# Patient Record
Sex: FEMALE | Race: BLACK OR AFRICAN AMERICAN | NOT HISPANIC OR LATINO | Employment: FULL TIME | URBAN - METROPOLITAN AREA
[De-identification: names, ages, dates, MRNs, and addresses within clinical notes are randomized per-mention and may not be internally consistent; named-entity substitution may affect disease eponyms.]

---

## 2024-07-06 ENCOUNTER — APPOINTMENT (OUTPATIENT)
Dept: RADIOLOGY | Facility: HOSPITAL | Age: 69
End: 2024-07-06
Payer: MEDICARE

## 2024-07-06 ENCOUNTER — HOSPITAL ENCOUNTER (EMERGENCY)
Facility: HOSPITAL | Age: 69
Discharge: HOME | End: 2024-07-07
Attending: EMERGENCY MEDICINE
Payer: MEDICARE

## 2024-07-06 DIAGNOSIS — L03.818 CELLULITIS OF OTHER SPECIFIED SITE: ICD-10-CM

## 2024-07-06 DIAGNOSIS — L08.9 SOFT TISSUE INFECTION OF FOOT: Primary | ICD-10-CM

## 2024-07-06 LAB
ALBUMIN SERPL BCP-MCNC: 4.3 G/DL (ref 3.4–5)
ALP SERPL-CCNC: 81 U/L (ref 33–136)
ALT SERPL W P-5'-P-CCNC: 11 U/L (ref 7–45)
ANION GAP SERPL CALC-SCNC: 13 MMOL/L (ref 10–20)
AST SERPL W P-5'-P-CCNC: 25 U/L (ref 9–39)
BASOPHILS # BLD AUTO: 0.09 X10*3/UL (ref 0–0.1)
BASOPHILS NFR BLD AUTO: 0.7 %
BILIRUB SERPL-MCNC: 0.3 MG/DL (ref 0–1.2)
BUN SERPL-MCNC: 17 MG/DL (ref 6–23)
CALCIUM SERPL-MCNC: 9.8 MG/DL (ref 8.6–10.6)
CHLORIDE SERPL-SCNC: 102 MMOL/L (ref 98–107)
CO2 SERPL-SCNC: 27 MMOL/L (ref 21–32)
CREAT SERPL-MCNC: 0.65 MG/DL (ref 0.5–1.05)
EGFRCR SERPLBLD CKD-EPI 2021: >90 ML/MIN/1.73M*2
EOSINOPHIL # BLD AUTO: 0.27 X10*3/UL (ref 0–0.7)
EOSINOPHIL NFR BLD AUTO: 2.2 %
ERYTHROCYTE [DISTWIDTH] IN BLOOD BY AUTOMATED COUNT: 12.3 % (ref 11.5–14.5)
GLUCOSE SERPL-MCNC: 166 MG/DL (ref 74–99)
HCT VFR BLD AUTO: 37.7 % (ref 36–46)
HGB BLD-MCNC: 13.1 G/DL (ref 12–16)
IMM GRANULOCYTES # BLD AUTO: 0.05 X10*3/UL (ref 0–0.7)
IMM GRANULOCYTES NFR BLD AUTO: 0.4 % (ref 0–0.9)
LYMPHOCYTES # BLD AUTO: 2.09 X10*3/UL (ref 1.2–4.8)
LYMPHOCYTES NFR BLD AUTO: 17.1 %
MCH RBC QN AUTO: 27.1 PG (ref 26–34)
MCHC RBC AUTO-ENTMCNC: 34.7 G/DL (ref 32–36)
MCV RBC AUTO: 78 FL (ref 80–100)
MONOCYTES # BLD AUTO: 0.86 X10*3/UL (ref 0.1–1)
MONOCYTES NFR BLD AUTO: 7 %
NEUTROPHILS # BLD AUTO: 8.85 X10*3/UL (ref 1.2–7.7)
NEUTROPHILS NFR BLD AUTO: 72.6 %
NRBC BLD-RTO: 0 /100 WBCS (ref 0–0)
PLATELET # BLD AUTO: 208 X10*3/UL (ref 150–450)
POTASSIUM SERPL-SCNC: 4.7 MMOL/L (ref 3.5–5.3)
PROT SERPL-MCNC: 7.8 G/DL (ref 6.4–8.2)
RBC # BLD AUTO: 4.83 X10*6/UL (ref 4–5.2)
SODIUM SERPL-SCNC: 137 MMOL/L (ref 136–145)
WBC # BLD AUTO: 12.2 X10*3/UL (ref 4.4–11.3)

## 2024-07-06 PROCEDURE — 85652 RBC SED RATE AUTOMATED: CPT

## 2024-07-06 PROCEDURE — 86140 C-REACTIVE PROTEIN: CPT

## 2024-07-06 PROCEDURE — 85025 COMPLETE CBC W/AUTO DIFF WBC: CPT | Performed by: EMERGENCY MEDICINE

## 2024-07-06 PROCEDURE — 80053 COMPREHEN METABOLIC PANEL: CPT | Performed by: EMERGENCY MEDICINE

## 2024-07-06 PROCEDURE — 99284 EMERGENCY DEPT VISIT MOD MDM: CPT

## 2024-07-06 PROCEDURE — 36415 COLL VENOUS BLD VENIPUNCTURE: CPT | Performed by: EMERGENCY MEDICINE

## 2024-07-06 PROCEDURE — 99285 EMERGENCY DEPT VISIT HI MDM: CPT | Performed by: EMERGENCY MEDICINE

## 2024-07-06 PROCEDURE — 73630 X-RAY EXAM OF FOOT: CPT | Mod: LEFT SIDE | Performed by: RADIOLOGY

## 2024-07-06 PROCEDURE — 73630 X-RAY EXAM OF FOOT: CPT | Mod: LT

## 2024-07-06 RX ORDER — KETOROLAC TROMETHAMINE 15 MG/ML
15 INJECTION, SOLUTION INTRAMUSCULAR; INTRAVENOUS ONCE
Status: COMPLETED | OUTPATIENT
Start: 2024-07-06 | End: 2024-07-07

## 2024-07-06 RX ORDER — IBUPROFEN 400 MG/1
800 TABLET ORAL ONCE
Status: DISCONTINUED | OUTPATIENT
Start: 2024-07-06 | End: 2024-07-06

## 2024-07-06 ASSESSMENT — LIFESTYLE VARIABLES
EVER FELT BAD OR GUILTY ABOUT YOUR DRINKING: NO
EVER HAD A DRINK FIRST THING IN THE MORNING TO STEADY YOUR NERVES TO GET RID OF A HANGOVER: NO
HAVE PEOPLE ANNOYED YOU BY CRITICIZING YOUR DRINKING: NO
HAVE YOU EVER FELT YOU SHOULD CUT DOWN ON YOUR DRINKING: NO
TOTAL SCORE: 0

## 2024-07-06 ASSESSMENT — PAIN - FUNCTIONAL ASSESSMENT: PAIN_FUNCTIONAL_ASSESSMENT: 0-10

## 2024-07-06 ASSESSMENT — COLUMBIA-SUICIDE SEVERITY RATING SCALE - C-SSRS
2. HAVE YOU ACTUALLY HAD ANY THOUGHTS OF KILLING YOURSELF?: NO
6. HAVE YOU EVER DONE ANYTHING, STARTED TO DO ANYTHING, OR PREPARED TO DO ANYTHING TO END YOUR LIFE?: NO
1. IN THE PAST MONTH, HAVE YOU WISHED YOU WERE DEAD OR WISHED YOU COULD GO TO SLEEP AND NOT WAKE UP?: NO

## 2024-07-06 NOTE — ED TRIAGE NOTES
Pt states she did a foot soak last night and scrubbed some skin off, pt now has open area to foot, hx of diabetes

## 2024-07-07 VITALS
RESPIRATION RATE: 16 BRPM | SYSTOLIC BLOOD PRESSURE: 117 MMHG | HEART RATE: 92 BPM | BODY MASS INDEX: 36.32 KG/M2 | TEMPERATURE: 98.4 F | DIASTOLIC BLOOD PRESSURE: 80 MMHG | WEIGHT: 205 LBS | OXYGEN SATURATION: 97 % | HEIGHT: 63 IN

## 2024-07-07 LAB
CRP SERPL-MCNC: 1.94 MG/DL
ERYTHROCYTE [SEDIMENTATION RATE] IN BLOOD BY WESTERGREN METHOD: >130 MM/H (ref 0–30)

## 2024-07-07 PROCEDURE — 2500000004 HC RX 250 GENERAL PHARMACY W/ HCPCS (ALT 636 FOR OP/ED): Mod: JZ,JG

## 2024-07-07 PROCEDURE — 2500000001 HC RX 250 WO HCPCS SELF ADMINISTERED DRUGS (ALT 637 FOR MEDICARE OP)

## 2024-07-07 PROCEDURE — 96375 TX/PRO/DX INJ NEW DRUG ADDON: CPT

## 2024-07-07 PROCEDURE — 96365 THER/PROPH/DIAG IV INF INIT: CPT

## 2024-07-07 RX ORDER — CLINDAMYCIN HYDROCHLORIDE 150 MG/1
450 CAPSULE ORAL 3 TIMES DAILY
Qty: 28 CAPSULE | Refills: 0 | Status: SHIPPED | OUTPATIENT
Start: 2024-07-07 | End: 2024-07-07

## 2024-07-07 RX ORDER — CLINDAMYCIN PHOSPHATE 600 MG/50ML
600 INJECTION, SOLUTION INTRAVENOUS ONCE
Status: COMPLETED | OUTPATIENT
Start: 2024-07-07 | End: 2024-07-07

## 2024-07-07 RX ORDER — OXYCODONE HYDROCHLORIDE 5 MG/1
5 TABLET ORAL EVERY 6 HOURS PRN
Qty: 12 TABLET | Refills: 0 | Status: SHIPPED | OUTPATIENT
Start: 2024-07-07 | End: 2024-07-07

## 2024-07-07 RX ORDER — OXYCODONE HYDROCHLORIDE 5 MG/1
5 TABLET ORAL ONCE
Status: COMPLETED | OUTPATIENT
Start: 2024-07-07 | End: 2024-07-07

## 2024-07-07 RX ORDER — OXYCODONE HYDROCHLORIDE 5 MG/1
5 TABLET ORAL EVERY 6 HOURS PRN
Qty: 12 TABLET | Refills: 0 | Status: SHIPPED | OUTPATIENT
Start: 2024-07-07 | End: 2024-07-10

## 2024-07-07 RX ORDER — IBUPROFEN 600 MG/1
600 TABLET ORAL EVERY 6 HOURS PRN
Qty: 28 TABLET | Refills: 0 | Status: ON HOLD | OUTPATIENT
Start: 2024-07-07 | End: 2024-07-18

## 2024-07-07 RX ORDER — CLINDAMYCIN HYDROCHLORIDE 150 MG/1
450 CAPSULE ORAL 3 TIMES DAILY
Qty: 28 CAPSULE | Refills: 0 | Status: ON HOLD | OUTPATIENT
Start: 2024-07-07 | End: 2024-07-18 | Stop reason: ALTCHOICE

## 2024-07-07 RX ORDER — IBUPROFEN 600 MG/1
600 TABLET ORAL EVERY 6 HOURS PRN
Qty: 28 TABLET | Refills: 0 | Status: SHIPPED | OUTPATIENT
Start: 2024-07-07 | End: 2024-07-07

## 2024-07-07 ASSESSMENT — PAIN - FUNCTIONAL ASSESSMENT: PAIN_FUNCTIONAL_ASSESSMENT: 0-10

## 2024-07-07 ASSESSMENT — PAIN DESCRIPTION - LOCATION
LOCATION: TOE (COMMENT WHICH ONE)
LOCATION: TOE (COMMENT WHICH ONE)

## 2024-07-07 ASSESSMENT — PAIN SCALES - GENERAL
PAINLEVEL_OUTOF10: 10 - WORST POSSIBLE PAIN
PAINLEVEL_OUTOF10: 7
PAINLEVEL_OUTOF10: 5 - MODERATE PAIN
PAINLEVEL_OUTOF10: 8

## 2024-07-07 ASSESSMENT — PAIN DESCRIPTION - ORIENTATION
ORIENTATION: LEFT
ORIENTATION: LEFT

## 2024-07-07 ASSESSMENT — PAIN SCALES - WONG BAKER
WONGBAKER_NUMERICALRESPONSE: HURTS LITTLE MORE
WONGBAKER_NUMERICALRESPONSE: HURTS LITTLE BIT
WONGBAKER_NUMERICALRESPONSE: HURTS LITTLE MORE

## 2024-07-07 ASSESSMENT — PAIN DESCRIPTION - PAIN TYPE: TYPE: ACUTE PAIN

## 2024-07-07 NOTE — ED PROVIDER NOTES
Emergency Department Provider Note        History of Present Illness     History provided by: Patient  Limitations to History: None  External Records Reviewed with Brief Summary: Care Everywhere visit at [facility] on 2022 which showed treatment with linezolid for abscess and cellulitis of left foot, MRSA, GBS      HPI:  Ramya Muller is a 69 y.o. female with a history of diabetes on metformin who presents with a new open wound on her left foot since yesterday when she soaked her feet and scrubbed off dead skin. The wound is on the bottom of her left great toe and she is experiencing increased pain and swelling in her foot and ankle since the incident. She denies any trauma to her foot or ankle. She denies fevers, chills, nausea, and vomiting.    Physical Exam   Triage vitals:  T 36.6 °C (97.9 °F)  HR (!) 103  BP (!) 169/96  RR 16  O2 98 % None (Room air)    Physical Exam  Constitutional:       General: She is not in acute distress.  Cardiovascular:      Rate and Rhythm: Normal rate and regular rhythm.      Pulses: Normal pulses.      Heart sounds: Normal heart sounds.   Pulmonary:      Effort: Pulmonary effort is normal.      Breath sounds: Normal breath sounds.   Musculoskeletal:      Comments: Mild swelling to left ankle and foot   Skin:     Comments: Small, white open wound to plantar aspect of left great toe with surrounding callous and hyperpigmentation. Left foot and ankle warm to touch compared to right lower extremity.   Neurological:      Mental Status: She is alert.          Medical Decision Making & ED Course   Medical Decision Makin y.o. female with a history of diabetes who presents with a new open wound on her left great toe since yesterday. No bone abnormalities were visualized on x-ray. With shared decision making, patient is agreeable to one dose of IV antibiotics in the ED with discharge on oral antibiotics and pain control. She will follow up with podiatry outpatient.      ----  Scoring Tools Utilized: none    Differential diagnoses considered include but are not limited to: localized soft tissue infection, cellulitis, osteomyelitis     Social Determinants of Health which Significantly Impact Care: None identified The following actions were taken to address these social determinants: N/A    EKG Independent Interpretation: EKG not obtained    Independent Result Review and Interpretation:  CBC, CMP, x-ray left foot    Chronic conditions affecting the patient's care: As documented above in [facility]    The patient was discussed with the following consultants/services: None    Care Considerations: As documented above in [facility]    ED Course:  ED Course as of 07/07/24 0104   Sun Jul 07, 2024 0035 XR foot left 3+ views  No bone abnormalities seen on x-ray [MG]      ED Course User Index  [MG] Anna Guevara MD         Diagnoses as of 07/07/24 0104   Soft tissue infection of foot   Cellulitis of other specified site     Disposition   As a result of the work-up, the patient was discharged home.  she was informed of her diagnosis and instructed to come back with any concerns or worsening of condition.  she was agreeable to the plan as discussed above.  she was given the opportunity to ask questions.  All of the patient's questions were answered.    Procedures   Procedures    Patient seen and discussed with ED attending physician.    Anna Guevara MD  Emergency Medicine      Anna Guevara MD  Resident  07/07/24 0131

## 2024-07-18 ENCOUNTER — APPOINTMENT (OUTPATIENT)
Dept: RADIOLOGY | Facility: HOSPITAL | Age: 69
DRG: 617 | End: 2024-07-18
Payer: COMMERCIAL

## 2024-07-18 ENCOUNTER — HOSPITAL ENCOUNTER (INPATIENT)
Facility: HOSPITAL | Age: 69
DRG: 617 | End: 2024-07-18
Attending: EMERGENCY MEDICINE | Admitting: STUDENT IN AN ORGANIZED HEALTH CARE EDUCATION/TRAINING PROGRAM
Payer: COMMERCIAL

## 2024-07-18 DIAGNOSIS — R53.81 DEBILITY: ICD-10-CM

## 2024-07-18 DIAGNOSIS — M86.8X7 OTHER OSTEOMYELITIS OF LEFT FOOT (MULTI): ICD-10-CM

## 2024-07-18 DIAGNOSIS — E11.69 TYPE 2 DIABETES MELLITUS WITH OTHER SPECIFIED COMPLICATION, WITHOUT LONG-TERM CURRENT USE OF INSULIN (MULTI): ICD-10-CM

## 2024-07-18 DIAGNOSIS — E11.51 TYPE 2 DIABETES MELLITUS WITH DIABETIC PERIPHERAL ANGIOPATHY WITHOUT GANGRENE, WITHOUT LONG-TERM CURRENT USE OF INSULIN (MULTI): ICD-10-CM

## 2024-07-18 DIAGNOSIS — K59.00 CONSTIPATION, UNSPECIFIED CONSTIPATION TYPE: ICD-10-CM

## 2024-07-18 DIAGNOSIS — E13.621 DIABETIC ULCER OF TOE OF LEFT FOOT ASSOCIATED WITH DIABETES MELLITUS OF OTHER TYPE, UNSPECIFIED ULCER STAGE (MULTI): Primary | ICD-10-CM

## 2024-07-18 DIAGNOSIS — I82.463: ICD-10-CM

## 2024-07-18 DIAGNOSIS — L97.529 DIABETIC ULCER OF TOE OF LEFT FOOT ASSOCIATED WITH DIABETES MELLITUS OF OTHER TYPE, UNSPECIFIED ULCER STAGE (MULTI): Primary | ICD-10-CM

## 2024-07-18 LAB
ALBUMIN SERPL BCP-MCNC: 3.8 G/DL (ref 3.4–5)
ALP SERPL-CCNC: 79 U/L (ref 33–136)
ALT SERPL W P-5'-P-CCNC: 13 U/L (ref 7–45)
ANION GAP SERPL CALC-SCNC: 15 MMOL/L (ref 10–20)
AST SERPL W P-5'-P-CCNC: 21 U/L (ref 9–39)
BASOPHILS # BLD AUTO: 0.06 X10*3/UL (ref 0–0.1)
BASOPHILS NFR BLD AUTO: 0.6 %
BILIRUB SERPL-MCNC: 0.4 MG/DL (ref 0–1.2)
BUN SERPL-MCNC: 20 MG/DL (ref 6–23)
CALCIUM SERPL-MCNC: 8.9 MG/DL (ref 8.6–10.6)
CHLORIDE SERPL-SCNC: 101 MMOL/L (ref 98–107)
CO2 SERPL-SCNC: 26 MMOL/L (ref 21–32)
CREAT SERPL-MCNC: 0.62 MG/DL (ref 0.5–1.05)
CRP SERPL-MCNC: 14.69 MG/DL
EGFRCR SERPLBLD CKD-EPI 2021: >90 ML/MIN/1.73M*2
EOSINOPHIL # BLD AUTO: 0.23 X10*3/UL (ref 0–0.7)
EOSINOPHIL NFR BLD AUTO: 2.3 %
ERYTHROCYTE [DISTWIDTH] IN BLOOD BY AUTOMATED COUNT: 11.9 % (ref 11.5–14.5)
ERYTHROCYTE [SEDIMENTATION RATE] IN BLOOD BY WESTERGREN METHOD: 84 MM/H (ref 0–30)
EST. AVERAGE GLUCOSE BLD GHB EST-MCNC: 200 MG/DL
GLUCOSE BLD MANUAL STRIP-MCNC: 152 MG/DL (ref 74–99)
GLUCOSE BLD MANUAL STRIP-MCNC: 154 MG/DL (ref 74–99)
GLUCOSE SERPL-MCNC: 124 MG/DL (ref 74–99)
HBA1C MFR BLD: 8.6 %
HCT VFR BLD AUTO: 36.2 % (ref 36–46)
HGB BLD-MCNC: 11.6 G/DL (ref 12–16)
IMM GRANULOCYTES # BLD AUTO: 0.04 X10*3/UL (ref 0–0.7)
IMM GRANULOCYTES NFR BLD AUTO: 0.4 % (ref 0–0.9)
LACTATE SERPL-SCNC: 0.9 MMOL/L (ref 0.4–2)
LYMPHOCYTES # BLD AUTO: 1.6 X10*3/UL (ref 1.2–4.8)
LYMPHOCYTES NFR BLD AUTO: 15.7 %
MAGNESIUM SERPL-MCNC: 1.98 MG/DL (ref 1.6–2.4)
MCH RBC QN AUTO: 25.6 PG (ref 26–34)
MCHC RBC AUTO-ENTMCNC: 32 G/DL (ref 32–36)
MCV RBC AUTO: 80 FL (ref 80–100)
MONOCYTES # BLD AUTO: 0.83 X10*3/UL (ref 0.1–1)
MONOCYTES NFR BLD AUTO: 8.1 %
NEUTROPHILS # BLD AUTO: 7.45 X10*3/UL (ref 1.2–7.7)
NEUTROPHILS NFR BLD AUTO: 72.9 %
NRBC BLD-RTO: 0 /100 WBCS (ref 0–0)
PLATELET # BLD AUTO: 242 X10*3/UL (ref 150–450)
POTASSIUM SERPL-SCNC: 3.7 MMOL/L (ref 3.5–5.3)
PROT SERPL-MCNC: 7.6 G/DL (ref 6.4–8.2)
RBC # BLD AUTO: 4.54 X10*6/UL (ref 4–5.2)
SODIUM SERPL-SCNC: 138 MMOL/L (ref 136–145)
WBC # BLD AUTO: 10.2 X10*3/UL (ref 4.4–11.3)

## 2024-07-18 PROCEDURE — 96361 HYDRATE IV INFUSION ADD-ON: CPT

## 2024-07-18 PROCEDURE — 85025 COMPLETE CBC W/AUTO DIFF WBC: CPT | Performed by: PHYSICIAN ASSISTANT

## 2024-07-18 PROCEDURE — 82728 ASSAY OF FERRITIN: CPT

## 2024-07-18 PROCEDURE — 99285 EMERGENCY DEPT VISIT HI MDM: CPT | Mod: 25

## 2024-07-18 PROCEDURE — 2500000001 HC RX 250 WO HCPCS SELF ADMINISTERED DRUGS (ALT 637 FOR MEDICARE OP)

## 2024-07-18 PROCEDURE — 73630 X-RAY EXAM OF FOOT: CPT | Mod: LT

## 2024-07-18 PROCEDURE — 36415 COLL VENOUS BLD VENIPUNCTURE: CPT | Performed by: PHYSICIAN ASSISTANT

## 2024-07-18 PROCEDURE — 1100000001 HC PRIVATE ROOM DAILY

## 2024-07-18 PROCEDURE — 82947 ASSAY GLUCOSE BLOOD QUANT: CPT

## 2024-07-18 PROCEDURE — 83605 ASSAY OF LACTIC ACID: CPT | Performed by: PHYSICIAN ASSISTANT

## 2024-07-18 PROCEDURE — 80053 COMPREHEN METABOLIC PANEL: CPT | Performed by: PHYSICIAN ASSISTANT

## 2024-07-18 PROCEDURE — 2500000001 HC RX 250 WO HCPCS SELF ADMINISTERED DRUGS (ALT 637 FOR MEDICARE OP): Performed by: PHYSICIAN ASSISTANT

## 2024-07-18 PROCEDURE — 83540 ASSAY OF IRON: CPT

## 2024-07-18 PROCEDURE — 83036 HEMOGLOBIN GLYCOSYLATED A1C: CPT

## 2024-07-18 PROCEDURE — 87040 BLOOD CULTURE FOR BACTERIA: CPT | Performed by: PHYSICIAN ASSISTANT

## 2024-07-18 PROCEDURE — 2500000004 HC RX 250 GENERAL PHARMACY W/ HCPCS (ALT 636 FOR OP/ED)

## 2024-07-18 PROCEDURE — 73630 X-RAY EXAM OF FOOT: CPT | Mod: LEFT SIDE | Performed by: RADIOLOGY

## 2024-07-18 PROCEDURE — 96367 TX/PROPH/DG ADDL SEQ IV INF: CPT

## 2024-07-18 PROCEDURE — 2500000002 HC RX 250 W HCPCS SELF ADMINISTERED DRUGS (ALT 637 FOR MEDICARE OP, ALT 636 FOR OP/ED)

## 2024-07-18 PROCEDURE — 96375 TX/PRO/DX INJ NEW DRUG ADDON: CPT

## 2024-07-18 PROCEDURE — 86140 C-REACTIVE PROTEIN: CPT

## 2024-07-18 PROCEDURE — 85652 RBC SED RATE AUTOMATED: CPT

## 2024-07-18 PROCEDURE — 84466 ASSAY OF TRANSFERRIN: CPT

## 2024-07-18 PROCEDURE — 99285 EMERGENCY DEPT VISIT HI MDM: CPT | Performed by: PHYSICIAN ASSISTANT

## 2024-07-18 PROCEDURE — 96365 THER/PROPH/DIAG IV INF INIT: CPT

## 2024-07-18 PROCEDURE — 2500000004 HC RX 250 GENERAL PHARMACY W/ HCPCS (ALT 636 FOR OP/ED): Performed by: PHYSICIAN ASSISTANT

## 2024-07-18 PROCEDURE — 73718 MRI LOWER EXTREMITY W/O DYE: CPT | Mod: LT

## 2024-07-18 PROCEDURE — 83735 ASSAY OF MAGNESIUM: CPT

## 2024-07-18 RX ORDER — DEXTROSE 50 % IN WATER (D50W) INTRAVENOUS SYRINGE
12.5
Status: DISCONTINUED | OUTPATIENT
Start: 2024-07-18 | End: 2024-07-30 | Stop reason: HOSPADM

## 2024-07-18 RX ORDER — CLINDAMYCIN IN PERCENT DEXTROSE 6 MG/ML
300 INJECTION, SOLUTION INTRAVENOUS EVERY 8 HOURS
Status: DISCONTINUED | OUTPATIENT
Start: 2024-07-18 | End: 2024-07-18

## 2024-07-18 RX ORDER — POLYETHYLENE GLYCOL 3350 17 G/17G
17 POWDER, FOR SOLUTION ORAL DAILY PRN
Status: DISCONTINUED | OUTPATIENT
Start: 2024-07-18 | End: 2024-07-20

## 2024-07-18 RX ORDER — DEXTROSE 50 % IN WATER (D50W) INTRAVENOUS SYRINGE
25
Status: DISCONTINUED | OUTPATIENT
Start: 2024-07-18 | End: 2024-07-30 | Stop reason: HOSPADM

## 2024-07-18 RX ORDER — CYCLOBENZAPRINE HCL 10 MG
5 TABLET ORAL ONCE
Status: COMPLETED | OUTPATIENT
Start: 2024-07-18 | End: 2024-07-18

## 2024-07-18 RX ORDER — ENOXAPARIN SODIUM 100 MG/ML
40 INJECTION SUBCUTANEOUS EVERY 24 HOURS
Status: DISCONTINUED | OUTPATIENT
Start: 2024-07-18 | End: 2024-07-30 | Stop reason: HOSPADM

## 2024-07-18 RX ORDER — DULOXETIN HYDROCHLORIDE 30 MG/1
30 CAPSULE, DELAYED RELEASE ORAL DAILY
COMMUNITY

## 2024-07-18 RX ORDER — HYDROMORPHONE HYDROCHLORIDE 1 MG/ML
0.4 INJECTION, SOLUTION INTRAMUSCULAR; INTRAVENOUS; SUBCUTANEOUS
Status: DISCONTINUED | OUTPATIENT
Start: 2024-07-18 | End: 2024-07-18

## 2024-07-18 RX ORDER — MORPHINE SULFATE 4 MG/ML
4 INJECTION INTRAVENOUS ONCE
Status: COMPLETED | OUTPATIENT
Start: 2024-07-18 | End: 2024-07-18

## 2024-07-18 RX ORDER — INSULIN LISPRO 100 [IU]/ML
0-5 INJECTION, SOLUTION INTRAVENOUS; SUBCUTANEOUS
Status: DISCONTINUED | OUTPATIENT
Start: 2024-07-18 | End: 2024-07-30 | Stop reason: HOSPADM

## 2024-07-18 RX ORDER — TRIAMTERENE/HYDROCHLOROTHIAZID 37.5-25 MG
1 TABLET ORAL DAILY
COMMUNITY

## 2024-07-18 RX ORDER — DULOXETIN HYDROCHLORIDE 30 MG/1
30 CAPSULE, DELAYED RELEASE ORAL DAILY
Status: DISCONTINUED | OUTPATIENT
Start: 2024-07-18 | End: 2024-07-30 | Stop reason: HOSPADM

## 2024-07-18 RX ORDER — VANCOMYCIN HYDROCHLORIDE 750 MG/150ML
750 INJECTION, SOLUTION INTRAVENOUS EVERY 8 HOURS
Status: DISCONTINUED | OUTPATIENT
Start: 2024-07-19 | End: 2024-07-22

## 2024-07-18 RX ORDER — VANCOMYCIN HYDROCHLORIDE 1 G/20ML
INJECTION, POWDER, LYOPHILIZED, FOR SOLUTION INTRAVENOUS DAILY PRN
Status: DISCONTINUED | OUTPATIENT
Start: 2024-07-18 | End: 2024-07-25

## 2024-07-18 RX ORDER — IBUPROFEN 600 MG/1
600 TABLET ORAL 3 TIMES DAILY PRN
Status: DISCONTINUED | OUTPATIENT
Start: 2024-07-18 | End: 2024-07-20

## 2024-07-18 RX ORDER — OXYCODONE HYDROCHLORIDE 5 MG/1
5 TABLET ORAL EVERY 6 HOURS PRN
COMMUNITY
End: 2024-07-30 | Stop reason: HOSPADM

## 2024-07-18 RX ORDER — VANCOMYCIN HYDROCHLORIDE 1 G/200ML
2 INJECTION, SOLUTION INTRAVENOUS ONCE
Status: COMPLETED | OUTPATIENT
Start: 2024-07-18 | End: 2024-07-18

## 2024-07-18 RX ORDER — BISMUTH SUBSALICYLATE 262 MG
1 TABLET,CHEWABLE ORAL DAILY
COMMUNITY

## 2024-07-18 RX ORDER — ACETAMINOPHEN 500 MG
2000 TABLET ORAL DAILY
COMMUNITY

## 2024-07-18 SDOH — HEALTH STABILITY: PHYSICAL HEALTH: ON AVERAGE, HOW MANY DAYS PER WEEK DO YOU ENGAGE IN MODERATE TO STRENUOUS EXERCISE (LIKE A BRISK WALK)?: 0 DAYS

## 2024-07-18 SDOH — SOCIAL STABILITY: SOCIAL INSECURITY: ARE THERE ANY APPARENT SIGNS OF INJURIES/BEHAVIORS THAT COULD BE RELATED TO ABUSE/NEGLECT?: NO

## 2024-07-18 SDOH — SOCIAL STABILITY: SOCIAL INSECURITY: DO YOU FEEL ANYONE HAS EXPLOITED OR TAKEN ADVANTAGE OF YOU FINANCIALLY OR OF YOUR PERSONAL PROPERTY?: NO

## 2024-07-18 SDOH — ECONOMIC STABILITY: INCOME INSECURITY: HOW HARD IS IT FOR YOU TO PAY FOR THE VERY BASICS LIKE FOOD, HOUSING, MEDICAL CARE, AND HEATING?: NOT HARD AT ALL

## 2024-07-18 SDOH — SOCIAL STABILITY: SOCIAL INSECURITY: HAVE YOU HAD ANY THOUGHTS OF HARMING ANYONE ELSE?: NO

## 2024-07-18 SDOH — ECONOMIC STABILITY: INCOME INSECURITY: IN THE LAST 12 MONTHS, WAS THERE A TIME WHEN YOU WERE NOT ABLE TO PAY THE MORTGAGE OR RENT ON TIME?: NO

## 2024-07-18 SDOH — ECONOMIC STABILITY: FOOD INSECURITY: WITHIN THE PAST 12 MONTHS, THE FOOD YOU BOUGHT JUST DIDN'T LAST AND YOU DIDN'T HAVE MONEY TO GET MORE.: NEVER TRUE

## 2024-07-18 SDOH — HEALTH STABILITY: MENTAL HEALTH
STRESS IS WHEN SOMEONE FEELS TENSE, NERVOUS, ANXIOUS, OR CAN'T SLEEP AT NIGHT BECAUSE THEIR MIND IS TROUBLED. HOW STRESSED ARE YOU?: NOT AT ALL

## 2024-07-18 SDOH — SOCIAL STABILITY: SOCIAL INSECURITY: HAS ANYONE EVER THREATENED TO HURT YOUR FAMILY OR YOUR PETS?: NO

## 2024-07-18 SDOH — SOCIAL STABILITY: SOCIAL INSECURITY: ABUSE: ADULT

## 2024-07-18 SDOH — SOCIAL STABILITY: SOCIAL INSECURITY: HAVE YOU HAD THOUGHTS OF HARMING ANYONE ELSE?: NO

## 2024-07-18 SDOH — ECONOMIC STABILITY: TRANSPORTATION INSECURITY
IN THE PAST 12 MONTHS, HAS THE LACK OF TRANSPORTATION KEPT YOU FROM MEDICAL APPOINTMENTS OR FROM GETTING MEDICATIONS?: NO

## 2024-07-18 SDOH — HEALTH STABILITY: PHYSICAL HEALTH: ON AVERAGE, HOW MANY MINUTES DO YOU ENGAGE IN EXERCISE AT THIS LEVEL?: 0 MIN

## 2024-07-18 SDOH — ECONOMIC STABILITY: HOUSING INSECURITY: AT ANY TIME IN THE PAST 12 MONTHS, WERE YOU HOMELESS OR LIVING IN A SHELTER (INCLUDING NOW)?: NO

## 2024-07-18 SDOH — ECONOMIC STABILITY: FOOD INSECURITY: WITHIN THE PAST 12 MONTHS, YOU WORRIED THAT YOUR FOOD WOULD RUN OUT BEFORE YOU GOT MONEY TO BUY MORE.: NEVER TRUE

## 2024-07-18 SDOH — SOCIAL STABILITY: SOCIAL INSECURITY: ARE YOU OR HAVE YOU BEEN THREATENED OR ABUSED PHYSICALLY, EMOTIONALLY, OR SEXUALLY BY ANYONE?: NO

## 2024-07-18 SDOH — ECONOMIC STABILITY: TRANSPORTATION INSECURITY
IN THE PAST 12 MONTHS, HAS LACK OF TRANSPORTATION KEPT YOU FROM MEETINGS, WORK, OR FROM GETTING THINGS NEEDED FOR DAILY LIVING?: NO

## 2024-07-18 SDOH — ECONOMIC STABILITY: HOUSING INSECURITY: IN THE PAST 12 MONTHS, HOW MANY TIMES HAVE YOU MOVED WHERE YOU WERE LIVING?: 1

## 2024-07-18 SDOH — SOCIAL STABILITY: SOCIAL INSECURITY: DO YOU FEEL UNSAFE GOING BACK TO THE PLACE WHERE YOU ARE LIVING?: NO

## 2024-07-18 SDOH — SOCIAL STABILITY: SOCIAL INSECURITY: DOES ANYONE TRY TO KEEP YOU FROM HAVING/CONTACTING OTHER FRIENDS OR DOING THINGS OUTSIDE YOUR HOME?: NO

## 2024-07-18 ASSESSMENT — COGNITIVE AND FUNCTIONAL STATUS - GENERAL
DAILY ACTIVITIY SCORE: 24
MOBILITY SCORE: 24
PATIENT BASELINE BEDBOUND: NO

## 2024-07-18 ASSESSMENT — LIFESTYLE VARIABLES
AUDIT-C TOTAL SCORE: 0
SUBSTANCE_ABUSE_PAST_12_MONTHS: NO
HOW MANY STANDARD DRINKS CONTAINING ALCOHOL DO YOU HAVE ON A TYPICAL DAY: PATIENT DOES NOT DRINK
PRESCIPTION_ABUSE_PAST_12_MONTHS: NO
HOW OFTEN DO YOU HAVE 6 OR MORE DRINKS ON ONE OCCASION: NEVER
SKIP TO QUESTIONS 9-10: 1
HOW OFTEN DO YOU HAVE A DRINK CONTAINING ALCOHOL: NEVER
AUDIT-C TOTAL SCORE: 0

## 2024-07-18 ASSESSMENT — ACTIVITIES OF DAILY LIVING (ADL)
ASSISTIVE_DEVICE: EYEGLASSES
HEARING - RIGHT EAR: FUNCTIONAL
ADEQUATE_TO_COMPLETE_ADL: YES
HEARING - LEFT EAR: FUNCTIONAL
DRESSING YOURSELF: INDEPENDENT
PATIENT'S MEMORY ADEQUATE TO SAFELY COMPLETE DAILY ACTIVITIES?: YES
JUDGMENT_ADEQUATE_SAFELY_COMPLETE_DAILY_ACTIVITIES: YES
BATHING: INDEPENDENT
FEEDING YOURSELF: INDEPENDENT
TOILETING: INDEPENDENT
WALKS IN HOME: INDEPENDENT
GROOMING: INDEPENDENT

## 2024-07-18 ASSESSMENT — PAIN SCALES - GENERAL
PAINLEVEL_OUTOF10: 6
PAINLEVEL_OUTOF10: 10 - WORST POSSIBLE PAIN
PAINLEVEL_OUTOF10: 6
PAINLEVEL_OUTOF10: 10 - WORST POSSIBLE PAIN

## 2024-07-18 ASSESSMENT — COLUMBIA-SUICIDE SEVERITY RATING SCALE - C-SSRS
2. HAVE YOU ACTUALLY HAD ANY THOUGHTS OF KILLING YOURSELF?: NO
1. IN THE PAST MONTH, HAVE YOU WISHED YOU WERE DEAD OR WISHED YOU COULD GO TO SLEEP AND NOT WAKE UP?: NO
6. HAVE YOU EVER DONE ANYTHING, STARTED TO DO ANYTHING, OR PREPARED TO DO ANYTHING TO END YOUR LIFE?: NO

## 2024-07-18 ASSESSMENT — PAIN - FUNCTIONAL ASSESSMENT: PAIN_FUNCTIONAL_ASSESSMENT: 0-10

## 2024-07-18 ASSESSMENT — PATIENT HEALTH QUESTIONNAIRE - PHQ9
2. FEELING DOWN, DEPRESSED OR HOPELESS: NOT AT ALL
1. LITTLE INTEREST OR PLEASURE IN DOING THINGS: NOT AT ALL
SUM OF ALL RESPONSES TO PHQ9 QUESTIONS 1 & 2: 0

## 2024-07-18 NOTE — SIGNIFICANT EVENT
"MEDICINE ADMISSION NOTE    History of Present Illness  Ramya Muller is a 69 y.o. female with PMHx of uncontrolled T2DM (A1c 10.2%, 4/15/2024) presenting with worsening L 1st digit diabetic foot wound associated with increased pain, purulent drainage, and bleeding for the past 2 weeks. Patient originally presented to  ED on 7/6/2024 for a new foot wound at the bottom of her L great toe with increased pain and swelling. It was reported that the wound developed after she soaked her foot in water and then scrubbed off dead skin. XR L foot at that time showed no bone abnormalities. She was diagnosed with a localized soft tissue infection. She was offered CDU admission for IV abx and podiatry consult vs. IV abx and discharge on PO clindamycin with outpatient podiatry consult. Patient elected the latter as she did not want to be admitted. Patient reports that she did not follow up with podiatry since discharge.       Patient denies any fever, chills, lightheadedness, shortness of breath, chest pain, abdominal pain, N/V/C/D.    ED Course:  /70   Pulse 93   Temp 36.9 °C (98.5 °F) (Oral)   Resp 17   Ht 1.575 m (5' 2\")   Wt 88.5 kg (195 lb)   SpO2 98%   BMI 35.67 kg/m²      Labs:  Results from last 7 days   Lab Units 07/18/24  1151   WBC AUTO x10*3/uL 10.2   HEMOGLOBIN g/dL 11.6*   HEMATOCRIT % 36.2   PLATELETS AUTO x10*3/uL 242            Results from last 7 days   Lab Units 07/18/24  1151   SODIUM mmol/L 138   POTASSIUM mmol/L 3.7   CHLORIDE mmol/L 101   CO2 mmol/L 26   BUN mg/dL 20   CREATININE mg/dL 0.62   CALCIUM mg/dL 8.9     Results from last 7 days   Lab Units 07/18/24  1151   ALK PHOS U/L 79   BILIRUBIN TOTAL mg/dL 0.4   PROTEIN TOTAL g/dL 7.6   ALT U/L 13   AST U/L 21                 ED Interventions:  Medications   HYDROmorphone (Dilaudid) injection 0.4 mg (0.4 mg intravenous Given 7/18/24 9887)   vancomycin (Vancocin) pharmacy to dose - pharmacy monitoring (has no administration in time range) "   piperacillin-tazobactam (Zosyn) 4.5 g in dextrose (iso)  mL (has no administration in time range)   glucagon (Glucagen) injection 1 mg (has no administration in time range)   dextrose 50 % injection 25 g (has no administration in time range)   glucagon (Glucagen) injection 1 mg (has no administration in time range)   dextrose 50 % injection 12.5 g (has no administration in time range)   insulin lispro (HumaLOG) injection 0-5 Units (has no administration in time range)   vancomycin (Vancocin) 750 mg in dextrose 5%  mL (has no administration in time range)   sodium chloride 0.9 % bolus 1,000 mL (0 mL intravenous Stopped 7/18/24 1345)   piperacillin-tazobactam (Zosyn) 4.5 g in dextrose (iso)  mL (0 g intravenous Stopped 7/18/24 1216)   vancomycin (Vancocin) 2 g in dextrose 5%  mL (0 g intravenous Stopped 7/18/24 1351)   morphine injection 4 mg (4 mg intravenous Given 7/18/24 1146)   cyclobenzaprine (Flexeril) tablet 5 mg (5 mg oral Given 7/18/24 1418)            Past Medical History  History reviewed. No pertinent past medical history.    Surgical History  History reviewed. No pertinent surgical history.    Social History  She has no history on file for tobacco use, alcohol use, and drug use.    Family History  No family history on file.     Allergies  Atorvastatin and Pregabalin     Physical Exam   Constitutional: Well-developed female in moderate pain, pleasant, nontoxic appearing  Neuro: Aox4, cooperative  HEENT: Normocephalic, atraumatic. EOMI.  Respiratory: CTA bilaterally. No wheezes, rales, or rhonchi. Normal respiratory effort.  Cardiovascular: RRR. No murmurs, gallops, or rubs. No JVD. Radial pulses 2+.  Abdominal: Soft, nondistended, nontender to palpation. Bowel sounds present. No hepatosplenomegaly or masses. No CVA tenderness.  L distal extremity: deep ulcerated wound on L 1st digit with necrotic tissue and surrounding erythema, small amount of blood, purulence, edema, warmth, and  TTP. No crepitus. Edema and warmth up to midfoot. 2+ DP pulse, able to move toes and foot, sensation impaired in digits 1-5 and present everywhere else.  R distal extremity: 2+ DP pulses, motor function, sensation intact.  Psych: Appropriate mood and affect.     Medications  Scheduled medications  insulin lispro, 0-5 Units, subcutaneous, TID  piperacillin-tazobactam, 4.5 g, intravenous, q6h  [START ON 7/19/2024] vancomycin, 750 mg, intravenous, q8h      Continuous medications     PRN medications  PRN medications: dextrose, dextrose, glucagon, glucagon, HYDROmorphone, vancomycin            Assessment/Plan     Ramya Muller is a 69 y.o. female with poorly controlled T2DM (A1c 10.2%, 4/15/2024) initially treated for a diabetic foot soft tissue infection of the L 1st digit now presenting with worsening pain and with nonhealing ulcer that probes to bone suspicious for osteomyelitis of L great toe. XR L foot with OM of L 1st toe, soft tissue gas in the 1st toe. Patient is in pain but otherwise vitally stable and nontoxic appearing. At risk but currently low concern for systemic or necrotizing infection.    #L 1st digit diabetic ulcer  #Concern for osteomyelitis   PLAN  -MRI L foot to confirm OM, evaluate extent of OM  -wound cx  -blood cxs  -ESR/CRP  -Vancomycin (7/18-)  -Zosyn 4.5 g q6h (7/18-)  -Pain control: ibuprofen 600 mg TID PRN, duloxetine 30 mg daily (home med)  -Wound care team consult  -Podiatry consult for possible debridement    #T2DM c/b neuropathy  Poorly controlled. Intermittent metformin adherence.  PLAN  -repeat A1c  -SSI #1 for now, monitor insulin requirement for next 24 hours  -hypoglycemia protocol    #AFIB (per chart review)  -pt is unaware of this diagnosis. No record of rate/rhythm control meds. Currently HR 93  PLAN  -obtain admission EKG      F: s/p 1 L NS  E: prn  N: regular  A: PIV  DVT: lovenox  GI: not indicated    Bowel Regimen: miralax prn  Code Status: Full code (confirmed on  admission)  NOK: Jennifer (daughter): [phone]     Primary Emergency Contact: JENNIFER MAYFIELD               Not yet staffed with the attending.  Alexander Jorgensen MD  PGY-2 Internal Medicine

## 2024-07-18 NOTE — PROGRESS NOTES
Pharmacy Medication History Review    Ramya Muller is a 69 y.o. female admitted for Diabetic ulcer of toe of left foot associated with diabetes mellitus of other type, unspecified ulcer stage (Multi). Pharmacy reviewed the patient's [id number] medications and allergies for accuracy.    The list below reflects the updated PTA list. Comments regarding how patient may be taking medications differently can be found in the Admit Orders Activity  Prior to Admission Medications   Prescriptions Last Dose Informant   DULoxetine (Cymbalta) 30 mg DR capsule Past Week at Monday Self   Sig: Take 1 capsule (30 mg) by mouth once daily as needed. Do not crush or chew.   cholecalciferol (D3-2000) 50 mcg (2,000 unit) capsule Past Week at Monday Self   Sig: Take 1 capsule (50 mcg) by mouth once daily.   clindamycin (Cleocin) 150 mg capsule Not Taking    Sig: Take 3 capsules (450 mg) by mouth 3 times a day for 7 days.   Patient not taking: Reported on 7/18/2024   ibuprofen 600 mg tablet 7/18/2024 Self   Sig: Take 1 tablet (600 mg) by mouth every 6 hours if needed for mild pain (1 - 3) or moderate pain (4 - 6) for up to 7 days.   multivitamin tablet Past Week at Monday Self   Sig: Take 1 tablet by mouth once daily.   oxyCODONE (Roxicodone) 5 mg immediate release tablet 7/17/2024 at Night Self   Sig: Take 1 tablet (5 mg) by mouth every 6 hours if needed for severe pain (7 - 10).   triamterene-hydrochlorothiazid (Maxzide-25) 37.5-25 mg tablet 7/18/2024 Self   Sig: Take 1 tablet by mouth once daily.      Facility-Administered Medications: None        The list below reflects the updated allergy list. Please review each documented allergy for additional clarification and justification.  Allergies  Reviewed by Breanna Phoenix on 7/18/2024        Severity Reactions Comments    Atorvastatin High Anaphylaxis     Pregabalin High Anaphylaxis             Patient accepts M2B at discharge. Pharmacy has been updated to [facility].    Sources used to complete the med history include out patient fill history, [place], and patient interview (patient was a good historian.).     Below are additional concerns with the patient's PTA list.    Diabetes mediations-  no confirmed current medications.  Past history of metformin/glimperide and long/short acting insulins, but no confirmed recent pharmacy fill history for any diabetes meds    -F internal med office visit on 4/18/24 with Dr. Slade states to resume/start taking Lantus 20 units once daily, but this does not appear to have happened based on conversation with patient/no records of any insulin being filled at pharmacies.    ~Patient no longer takes Clindamycin or Naproxen    ~Added:    *Duloxetine    *Triamterene- hydrochlorothiazide     *Oxycodone     *D3    *Multi vit.     BreAnna Phoenix, Alexus  Transitions of Care   Meds Ambulatory and Retail Services  Please reach out via Secure Chat for questions, or if no response call [employer] or [facility]

## 2024-07-18 NOTE — CONSULTS
"Vancomycin Dosing by Pharmacy- Initial    Ramya Muller is a 69 y.o. year old female who Pharmacy has been consulted for vancomycin dosing for bone and joint infection (diabetic foot infection) Based on the patient's indication and renal status this patient is being dosed based on a goal AUC of 400-600.     Renal function can be described as Normal Renal Function    Renal function is at patient's baseline      Visit Vitals  /70   Pulse 93   Temp 36.9 °C (98.5 °F) (Oral)   Resp 17        Lab Results   Component Value Date    CREATININE 0.62 07/18/2024    CREATININE 0.65 07/06/2024        Patient weight is No results found for: \"PTWEIGHT\"    No results found for: \"VANCORANDOM\", \"CULTURE\"     No intake/output data recorded.    No results found for: \"PATIENTTEMP\"       Assessment/Plan    Patient has already been given a loading dose of 2000 mg.  Will initiate vancomycin maintenance,  750 mg every 8 hours.    This dosing regimen is predicted by [employer] to result in the following pharmacokinetic parameters:    Loading dose: N/A  Regimen: 750 mg IV every 8 hours.  Start time: 00:51 on 07/19/2024  Exposure target: AUC24 (range)400-600 mg/L.hr   AUC24,ss: 486 mg/L.hr  Probability of AUC24 > 400: 70 %  Ctrough,ss: 16.4 mg/L  Probability of Ctrough,ss > 20: 33 %  Probability of nephrotoxicity (Lodise ALYCE 2009): 12 %      Follow-up level will be ordered on 7/19 at AM labs unless clinically indicated sooner.  Will continue to monitor renal function daily while on vancomycin and order serum creatinine at least every 48 hours if not already ordered.  Follow for continued vancomycin needs, clinical response, and signs/symptoms of toxicity.     Thank you for allowing me to participate in the care of this patient.     Henrietta Almeida, PharmD             "

## 2024-07-18 NOTE — CARE PLAN
The patient's goals for the shift include Patient willreceived IV antibiotics as ordered this shift    The clinical goals for the shift include Patient will have no c/o of pain or rate pain <4 this shift

## 2024-07-18 NOTE — H&P
"History Of Present Illness  Ramya Muller is a 69 y.o. female presenting with pain, swelling, ulceration, purulent discharge  and bleeding in the plantar aspect of left great toe. She has a past medical history of uncontrolled T2DM with last HbA1c of 10.6 and not under any medications, A-fib not on anticoagulation, tubular adenoma of colon, diverticulosis, arthritis, onychomycosis,  pain predominant fibromyalgia under duloxetine, hypertension not on medications, GERD on omeprazole and TATO on CPAP, stage 3a CKD, ovarian cancer stage IA s/p DOMINICK/BSO.    The lesion on her left great toe occurred on 7/5 as a new open wound when she soaked her feet and scrubbed off dead skin along with a \"piece of meat\". She went to ED on 7/6 at  for this. No bone abnormalities were visualized on x-ray and the patient agreed for one dose of IV antibiotics in the ED with discharge on oral clindamycin and pain control with outpatient podiatry consult. The ulceration has been worsening over the last two weeks despite outpatient oral antibiotics.     She has been diagnosed with diabetes 12 years ago and have been on different medication changes but her diabetes remained uncontrolled. No similar ulcerations had occurred before on the foot but she had been diagnosed and treated for cellulitis of left foot which healed completely.     She reports no claudications, don't have burning feet, shooting electric shock like pain but has painful  cramps in her bilateral lower limbs. Her lower limb has not experienced any color changes recently and but has venous engorgements.     She has no diplopia, clouding of vision, blurry vision, skin itching, froathy urine, limb swellings, puffy face, oliguria, nocturia, skin tags, or color changes.     She don't have history of stroke, myocardia infarction, heart failure, liver disease, pancreatic disease or any autoimmune diseases.     She denies fever, fatigue, malaise, chills and rigors. She denies chest " pain, shortness of breath, abdominal pain, lightheadedness, and palpitations.     In the ED she received IV fluids (0.9% NS), insulin, IV vancomycin and PIP/Tazo for emperic coverage, pain medications (morphine, hydromorphone)for foot pain and cyclobenzaprine for B/L LL cramps.       Social History  She has no history on file for tobacco use, alcohol use, and drug use.    Family History  Stroke in Father   Breast Cancer in Mother, confused about primary bone cancer vs mets  Breast Cancer in Maternal Grandmother   Breast Cancer in Maternal Aunt   Autoimmune disease Daughter: anti-synthetase syndrome      Allergies  Atorvastatin and Pregabalin    Review of Systems       Physical Exam  - The patient was well built, sitting on the chair complaining of lower limb cramps and pain in left food, especially her left toe. No malodor from wound.     - She was well built, communicating good, oriented to TPP and also her issues.     - No signs of pallor, icterus, lymphadenopathy, cyanosis, clubbing, dehydration but her left foot was edematous.    - Eyes: No corneal clouding or lenticular abnormalities    - Neck: Neck veins not distended    - CVS: S2S2M0    - Chest: B/L equal air entry, normal vesicular breath sound, no added sound    Local examination:     - 1 by 1 cm wound with yellow necrotic core surrounded by erythematous and scaly skin. The wound was wet with pus and blood admixed.     - The great toe in general was tender to palpation but Probing was non painful. Probing revealed deep necrotic soft tissue up to the bone. Post probing, there was oozing of pus and blood.     - Both dorsalis pedis and posterior tibialis pulses were palpable bilaterally     - No skin color changes, no cuts / burns / bruises or signs of past trauma or injury.     - Varicose veins in B/L calves     - All toe nails had fungal infections    - Neurological:    -  Decreased  pain, pressure and touch sensation on all toes.   -  Joint position sense  "decreased    -  Could not access vibration sensation    -  Bulk, tone and power WNL    - Ankle reflexes brisk bilaterally    - Knee reflexes brisk bilaterally    - Normal visual filed and gross vision   - Normal Extraocular movements    - Normal examinations of rest of the CN    Last Recorded Vitals  Blood pressure 117/70, pulse 93, temperature 36.9 °C (98.5 °F), temperature source Oral, resp. rate 17, height 1.575 m (5' 2\"), weight 88.5 kg (195 lb), SpO2 98%.    Relevant Results  Scheduled medications  DULoxetine, 30 mg, oral, Daily  enoxaparin, 40 mg, subcutaneous, q24h  insulin lispro, 0-5 Units, subcutaneous, TID  piperacillin-tazobactam, 4.5 g, intravenous, q6h  [START ON 7/19/2024] vancomycin, 750 mg, intravenous, q8h      Continuous medications     PRN medications  PRN medications: dextrose, dextrose, glucagon, glucagon, ibuprofen, polyethylene glycol, vancomycin     Results for orders placed or performed during the hospital encounter of 07/18/24 (from the past 24 hour(s))   CBC and Auto Differential   Result Value Ref Range    WBC 10.2 4.4 - 11.3 x10*3/uL    nRBC 0.0 0.0 - 0.0 /100 WBCs    RBC 4.54 4.00 - 5.20 x10*6/uL    Hemoglobin 11.6 (L) 12.0 - 16.0 g/dL    Hematocrit 36.2 36.0 - 46.0 %    MCV 80 80 - 100 fL    MCH 25.6 (L) 26.0 - 34.0 pg    MCHC 32.0 32.0 - 36.0 g/dL    RDW 11.9 11.5 - 14.5 %    Platelets 242 150 - 450 x10*3/uL    Neutrophils % 72.9 40.0 - 80.0 %    Immature Granulocytes %, Automated 0.4 0.0 - 0.9 %    Lymphocytes % 15.7 13.0 - 44.0 %    Monocytes % 8.1 2.0 - 10.0 %    Eosinophils % 2.3 0.0 - 6.0 %    Basophils % 0.6 0.0 - 2.0 %    Neutrophils Absolute 7.45 1.20 - 7.70 x10*3/uL    Immature Granulocytes Absolute, Automated 0.04 0.00 - 0.70 x10*3/uL    Lymphocytes Absolute 1.60 1.20 - 4.80 x10*3/uL    Monocytes Absolute 0.83 0.10 - 1.00 x10*3/uL    Eosinophils Absolute 0.23 0.00 - 0.70 x10*3/uL    Basophils Absolute 0.06 0.00 - 0.10 x10*3/uL   Comprehensive Metabolic Panel   Result Value " Ref Range    Glucose 124 (H) 74 - 99 mg/dL    Sodium 138 136 - 145 mmol/L    Potassium 3.7 3.5 - 5.3 mmol/L    Chloride 101 98 - 107 mmol/L    Bicarbonate 26 21 - 32 mmol/L    Anion Gap 15 10 - 20 mmol/L    Urea Nitrogen 20 6 - 23 mg/dL    Creatinine 0.62 0.50 - 1.05 mg/dL    eGFR >90 >60 mL/min/1.73m*2    Calcium 8.9 8.6 - 10.6 mg/dL    Albumin 3.8 3.4 - 5.0 g/dL    Alkaline Phosphatase 79 33 - 136 U/L    Total Protein 7.6 6.4 - 8.2 g/dL    AST 21 9 - 39 U/L    Bilirubin, Total 0.4 0.0 - 1.2 mg/dL    ALT 13 7 - 45 U/L   Lactate   Result Value Ref Range    Lactate 0.9 0.4 - 2.0 mmol/L   Blood Culture    Specimen: Peripheral Venipuncture; Blood culture   Result Value Ref Range    Blood Culture Loaded on Instrument - Culture in progress    Blood Culture    Specimen: Peripheral Venipuncture; Blood culture   Result Value Ref Range    Blood Culture Loaded on Instrument - Culture in progress    Sedimentation rate, automated   Result Value Ref Range    Sedimentation Rate 84 (H) 0 - 30 mm/h   C-reactive protein   Result Value Ref Range    C-Reactive Protein 14.69 (H) <1.00 mg/dL   Hemoglobin A1c   Result Value Ref Range    Hemoglobin A1C 8.6 (H) see below %    Estimated Average Glucose 200 Not Established mg/dL   Magnesium   Result Value Ref Range    Magnesium 1.98 1.60 - 2.40 mg/dL   POCT GLUCOSE   Result Value Ref Range    POCT Glucose 152 (H) 74 - 99 mg/dL      XR foot left 3+ views    Result Date: 7/18/2024  Interpreted By:  Smooth Espinoza and Booth Cameron STUDY: XR FOOT LEFT 3+ VIEWS; ;  7/18/2024 12:20 pm   INDICATION: Signs/Symptoms:L toe infection.   COMPARISON: XR left foot 07/06/2024   ACCESSION NUMBER(S): [id number]   ORDERING CLINICIAN: ALEJANDRO DUTTA   FINDINGS: Decreased radiodensity 1st toe distal phalanx with loss of normal trabecular architecture and erosion of overlying cortex, with destruction of the bone stock. There is also medial displacement of eroded cortical fragments. There is question of some  lucency with erosion to the medial side of the head of the proximal phalanx of the 1st digit. Osteophyte present at the plantar aspect of the calcaneus. Enthesophyte noted at posterior aspect of calcaneal tuberosity. Mild degenerative changes of the talocalcaneonavicular joint and forefoot, worst at 1st MTP and DIP joints. There is a wound/ulcer on the distal medial aspect of the 1st toe. There are mottled foci of lucency projecting over the 1st toe most evident along the medial aspect of the toe at the level of the distal metaphysis of the proximal phalanx       1. Findings most compatible with osteomyelitis of the 1st toe with overlying wound/ulcer on the medial side of the toe. 2. Findings which may represent osteomyelitis of the head of the proximal phalanx of the 1st digit. 3. Soft tissue gas in the 1st toe for which differential considerations include sequelae of open nature of process gas-forming infection and/or any recent intervention. 4. MRI is recommended for further assessment.     I personally reviewed the images/study and I agree with the findings as stated. This study was interpreted at [place], [place].   MACRO: None   Signed by: Smooth Espinoza 7/18/2024 1:16 PM Dictation workstation:   [id number]    XR foot left 3+ views    Result Date: 7/7/2024  STUDY: Foot Radiographs; 07/06/2024 11:40 PM INDICATION: Left foot wound, new.  COMPARISON: None available.  ACCESSION NUMBER(S): [id number] ORDERING CLINICIAN: TIERRA POLLACK TECHNIQUE:  Three view(s) of the left foot. FINDINGS:  There is no displaced fracture.  The alignment is anatomic.  Moderate dorsal soft tissue swelling.  No suspicious osseous erosive lesion identified.    No acute osseous abnormality.  Moderate dorsal soft tissue swelling Signed by Dwight Villa MD    .     Assessment/Plan   Principal Problem:    Diabetic ulcer of toe of left foot associated with diabetes mellitus of other type,  unspecified ulcer stage (Multi)    Ramya Muller is a 69 y.o. female with poorly controlled T2DM initially treated for a diabetic foot soft tissue infection of the L 1st digit now presenting with worsening pain and with nonhealing ulcer that probes to bone suspicious for osteomyelitis of L great toe. XR L foot Patient is in pain but otherwise vitally stable and nontoxic appearing. Low concern for systemic infection.     #Diabetic foot with osteomyelitis  - Started on emperic antibiotics (VANC AND PIP/HEBERT)   - Wound and blood culture   - Wound care and debridement   - Distal neurovascular status check q12   - ibuprofen for pain   - DVT prophylaxis   - MRI L foot to confirm OM, evaluate extent of OM   - Podiatry consult for possible debridement     #DM  - Check recent glycemic control by hba1c   - Start on sliding scale insulin   - look for off records home medicines she have been taking     #Fibromyalgia and diabetic neuropathy  - C/W home meds Duloxetine   .  #AFIB  -pt is unaware of this diagnosis. No record of rate/rhythm control meds. Currently HR 93  PLAN  -obtain admission EKG    #Lower leg cramps  - Start cyclobenzaprine      Bowel Regimen: miralax prn  Code Status: Full code (confirmed on admission)  NOK: Jennifer (daughter): [phone]      Primary Emergency Contact: JENNIFER MULLER MD

## 2024-07-18 NOTE — ED PROVIDER NOTES
"This is a 69-year-old female with past medical history of diabetes on metformin who presents to the ED with left great toe pain for approximately the past 1 month.  She was seen here on July 6 and was written a prescription for clindamycin which she states she took as prescribed without any relief of her symptoms.  She feels as if her pain as well as swelling to this toe has been worsening.  She has noticed some purulent drainage from the plantar aspect of this toe.  She denies any recent injury or trauma.  She denies this ever happening previously.  She is unsure of how her blood glucose levels have been recently.  She denies any associated fevers or chills.  She does endorse generalized malaise.  She denies any known history of gout.      History provided by:  Patient   used: No             Visit Vitals  /76   Pulse 95   Temp 36.7 °C (98 °F)   Resp 16   Ht 1.575 m (5' 2\")   Wt 88.5 kg (195 lb)   SpO2 99%   BMI 35.67 kg/m²   BSA 1.97 m²          Physical Exam     Physical exam:   General: Vitals noted, no distress. Afebrile.   EENT:  Hearing grossly intact. Normal phonation. MMM. Airway patient. PERRL. EOMI.   Neck: No midline tenderness or paraspinal tenderness. FROM.   Cardiac: Regular, rate, rhythm. Normal S1 and S2.  No murmurs, gallops, rubs.   Pulmonary: Good air exchange. Lungs clear bilaterally. No wheezes, rhonchi, rales. No accessory muscle use.   Extremities: No peripheral edema.  Full range of motion. Moves all extremities freely.  Exquisite tenderness palpation to the left great toe with associated soft tissue swelling, darkening of the skin as well as excess warmth.  There is a small ulceration to the plantar aspect of the foot.  See photos below.  Full range of motion of the toe.  No crepitus.  DP and PT pulses full and equal bilaterally.  Skin: No rash. Warm and Dry.   Neuro: No focal neurologic deficits. CN 2-12 grossly intact. Sensation equal bilaterally. No weakness. "             Labs Reviewed   CBC WITH AUTO DIFFERENTIAL - Abnormal       Result Value    WBC 10.2      nRBC 0.0      RBC 4.54      Hemoglobin 11.6 (*)     Hematocrit 36.2      MCV 80      MCH 25.6 (*)     MCHC 32.0      RDW 11.9      Platelets 242      Neutrophils % 72.9      Immature Granulocytes %, Automated 0.4      Lymphocytes % 15.7      Monocytes % 8.1      Eosinophils % 2.3      Basophils % 0.6      Neutrophils Absolute 7.45      Immature Granulocytes Absolute, Automated 0.04      Lymphocytes Absolute 1.60      Monocytes Absolute 0.83      Eosinophils Absolute 0.23      Basophils Absolute 0.06     COMPREHENSIVE METABOLIC PANEL - Abnormal    Glucose 124 (*)     Sodium 138      Potassium 3.7      Chloride 101      Bicarbonate 26      Anion Gap 15      Urea Nitrogen 20      Creatinine 0.62      eGFR >90      Calcium 8.9      Albumin 3.8      Alkaline Phosphatase 79      Total Protein 7.6      AST 21      Bilirubin, Total 0.4      ALT 13     LACTATE - Normal    Lactate 0.9      Narrative:     Venipuncture immediately after or during the administration of Metamizole may lead to falsely low results. Testing should be performed immediately  prior to Metamizole dosing.   BLOOD CULTURE   BLOOD CULTURE   POCT GLUCOSE METER       XR foot left 3+ views   Final Result   1. Findings most compatible with osteomyelitis of the 1st toe with   overlying wound/ulcer on the medial side of the toe.   2. Findings which may represent osteomyelitis of the head of the   proximal phalanx of the 1st digit.   3. Soft tissue gas in the 1st toe for which differential   considerations include sequelae of open nature of process gas-forming   infection and/or any recent intervention.   4. MRI is recommended for further assessment.             I personally reviewed the images/study and I agree with the findings   as stated. This study was interpreted at [facility], [place], [place].        MACRO:   None         Signed by: Smooth Espinoza 7/18/2024 1:16 PM   Dictation workstation:   [id number]              ED Course & MDM     Medical Decision Making  This is a 69-year-old female past medical history of diabetes on metformin who presents to the ED with left great toe pain, swelling, as well as an ulceration that has been worsening over the past 1 month despite outpatient oral antibiotics.  On examination she did have soft tissue swelling to this toe with associated ulcer, edema, and excess warmth.  No crepitus.  Full range of motion of toe.  Neurovascular intact throughout lower extremities bilaterally, specifically DP and PT pulses full and equal bilaterally.  X-ray of this foot ordered as well as laboratory studies.  Patient medicated 1 L of IV fluids vancomycin, Zosyn, and morphine for her symptoms.  She was discussed with attending physician.  Patient's x-ray was concerning for osteomyelitis.  No gas noted on her x-ray.  Labs overall grossly unremarkable with a normal lactate 0.9.  Normal WBC at 10.2.  I informed her of all of her results.  On my reassessment she was still endorsing a cramping sensation in her legs.  She was admitted to medicine for further management of her osteomyelitis and was ordered Flexeril for her leg cramping.  She was agreeable to this plan and had no further questions at time of admission.    Amount and/or Complexity of Data Reviewed  Labs: ordered.  Radiology: ordered and independent interpretation performed.     Details: No visualized fracture or dislocation         Diagnoses as of 07/18/24 1355   Diabetic ulcer of toe of left foot associated with diabetes mellitus of other type, unspecified ulcer stage (Multi)   Other osteomyelitis of left foot (Multi)       This was a shared visit with an ED attending.  The patient was seen and discussed with the ED attending    Procedures    LEO Loera, DIAMOND        ATTENDING ATTESTATION  Patient with a history of diabetes recently treated  outpatient for a diabetic foot ulcer presenting to the emergency department with worse pain to her left great toe, swelling redness.  She denies any fevers or chills at home no new injuries or trauma.  The left great toe is edematous, tender to touch especially over the dorsum, there is a plantar ulceration slightly more lateral oriented with no deep undermining, no purulent drainage.  It is warm to touch.  There is no crepitus or subcutaneous emphysema, the pulses DP and PT were normal and appreciable.  Concern for failure of outpatient treatment, no evidence of NSTI, she is otherwise stable without evidence of sepsis.  We will treat with broad-spectrum antibiotics for outpatient antibiotic failure, anticipate admission    Pawel Grigsby, [facility]  [facility]    The patient was seen by the resident/fellow.  I have personally performed a substantive portion of the encounter.  I have seen and examined the patient; agree with the workup, evaluation, MDM, management and diagnosis.    I have reviewed all the nurses' notes and have confirmed their findings, and have incorporated those findings into this medical record.   The care plan has been discussed with the resident/fellow; I have reviewed the resident/fellow’s note and agree with the documented findings with the exception/addition of information listed above.  On my own examination I agree and incorporated in this document my own history, examination findings and clinical decision making.  All notation in this Addendum supersedes information presented by the resident or MAYTE as listed above.        Glenda Marie PA-C  07/18/24 [id number]

## 2024-07-19 ENCOUNTER — APPOINTMENT (OUTPATIENT)
Dept: RADIOLOGY | Facility: HOSPITAL | Age: 69
DRG: 617 | End: 2024-07-19
Payer: COMMERCIAL

## 2024-07-19 ENCOUNTER — APPOINTMENT (OUTPATIENT)
Dept: CARDIOLOGY | Facility: HOSPITAL | Age: 69
DRG: 617 | End: 2024-07-19
Payer: COMMERCIAL

## 2024-07-19 LAB
ALBUMIN SERPL BCP-MCNC: 3.3 G/DL (ref 3.4–5)
ANION GAP SERPL CALC-SCNC: 14 MMOL/L (ref 10–20)
BASOPHILS # BLD AUTO: 0.05 X10*3/UL (ref 0–0.1)
BASOPHILS NFR BLD AUTO: 0.6 %
BUN SERPL-MCNC: 20 MG/DL (ref 6–23)
CALCIUM SERPL-MCNC: 8.6 MG/DL (ref 8.6–10.6)
CHLORIDE SERPL-SCNC: 101 MMOL/L (ref 98–107)
CO2 SERPL-SCNC: 25 MMOL/L (ref 21–32)
CREAT SERPL-MCNC: 0.59 MG/DL (ref 0.5–1.05)
EGFRCR SERPLBLD CKD-EPI 2021: >90 ML/MIN/1.73M*2
EOSINOPHIL # BLD AUTO: 0.3 X10*3/UL (ref 0–0.7)
EOSINOPHIL NFR BLD AUTO: 3.7 %
ERYTHROCYTE [DISTWIDTH] IN BLOOD BY AUTOMATED COUNT: 11.9 % (ref 11.5–14.5)
FERRITIN SERPL-MCNC: 230 NG/ML (ref 8–150)
GLUCOSE BLD MANUAL STRIP-MCNC: 142 MG/DL (ref 74–99)
GLUCOSE BLD MANUAL STRIP-MCNC: 145 MG/DL (ref 74–99)
GLUCOSE BLD MANUAL STRIP-MCNC: 147 MG/DL (ref 74–99)
GLUCOSE BLD MANUAL STRIP-MCNC: 151 MG/DL (ref 74–99)
GLUCOSE SERPL-MCNC: 130 MG/DL (ref 74–99)
HCT VFR BLD AUTO: 35.9 % (ref 36–46)
HGB BLD-MCNC: 11.1 G/DL (ref 12–16)
IMM GRANULOCYTES # BLD AUTO: 0.04 X10*3/UL (ref 0–0.7)
IMM GRANULOCYTES NFR BLD AUTO: 0.5 % (ref 0–0.9)
IRON SATN MFR SERPL: 7 % (ref 25–45)
IRON SERPL-MCNC: 20 UG/DL (ref 35–150)
LYMPHOCYTES # BLD AUTO: 1.2 X10*3/UL (ref 1.2–4.8)
LYMPHOCYTES NFR BLD AUTO: 15 %
MCH RBC QN AUTO: 25.8 PG (ref 26–34)
MCHC RBC AUTO-ENTMCNC: 30.9 G/DL (ref 32–36)
MCV RBC AUTO: 83 FL (ref 80–100)
MONOCYTES # BLD AUTO: 0.75 X10*3/UL (ref 0.1–1)
MONOCYTES NFR BLD AUTO: 9.4 %
NEUTROPHILS # BLD AUTO: 5.68 X10*3/UL (ref 1.2–7.7)
NEUTROPHILS NFR BLD AUTO: 70.8 %
NRBC BLD-RTO: 0 /100 WBCS (ref 0–0)
PHOSPHATE SERPL-MCNC: 3.4 MG/DL (ref 2.5–4.9)
PLATELET # BLD AUTO: 242 X10*3/UL (ref 150–450)
POTASSIUM SERPL-SCNC: 3.7 MMOL/L (ref 3.5–5.3)
RBC # BLD AUTO: 4.31 X10*6/UL (ref 4–5.2)
SODIUM SERPL-SCNC: 136 MMOL/L (ref 136–145)
TIBC SERPL-MCNC: 304 UG/DL (ref 240–445)
TRANSFERRIN SERPL-MCNC: 226 MG/DL (ref 200–360)
UIBC SERPL-MCNC: 284 UG/DL (ref 110–370)
VANCOMYCIN SERPL-MCNC: 10.9 UG/ML (ref 5–20)
WBC # BLD AUTO: 8 X10*3/UL (ref 4.4–11.3)

## 2024-07-19 PROCEDURE — 36415 COLL VENOUS BLD VENIPUNCTURE: CPT

## 2024-07-19 PROCEDURE — 99232 SBSQ HOSP IP/OBS MODERATE 35: CPT

## 2024-07-19 PROCEDURE — 93005 ELECTROCARDIOGRAM TRACING: CPT

## 2024-07-19 PROCEDURE — 93971 EXTREMITY STUDY: CPT | Performed by: RADIOLOGY

## 2024-07-19 PROCEDURE — 2500000004 HC RX 250 GENERAL PHARMACY W/ HCPCS (ALT 636 FOR OP/ED)

## 2024-07-19 PROCEDURE — 2500000004 HC RX 250 GENERAL PHARMACY W/ HCPCS (ALT 636 FOR OP/ED): Performed by: PHARMACIST

## 2024-07-19 PROCEDURE — 80202 ASSAY OF VANCOMYCIN: CPT | Performed by: PHARMACIST

## 2024-07-19 PROCEDURE — 93970 EXTREMITY STUDY: CPT

## 2024-07-19 PROCEDURE — 93010 ELECTROCARDIOGRAM REPORT: CPT | Performed by: INTERNAL MEDICINE

## 2024-07-19 PROCEDURE — 85025 COMPLETE CBC W/AUTO DIFF WBC: CPT

## 2024-07-19 PROCEDURE — 1100000001 HC PRIVATE ROOM DAILY

## 2024-07-19 PROCEDURE — 82565 ASSAY OF CREATININE: CPT

## 2024-07-19 PROCEDURE — 2500000001 HC RX 250 WO HCPCS SELF ADMINISTERED DRUGS (ALT 637 FOR MEDICARE OP)

## 2024-07-19 PROCEDURE — 87040 BLOOD CULTURE FOR BACTERIA: CPT

## 2024-07-19 PROCEDURE — 82947 ASSAY GLUCOSE BLOOD QUANT: CPT

## 2024-07-19 PROCEDURE — 2500000002 HC RX 250 W HCPCS SELF ADMINISTERED DRUGS (ALT 637 FOR MEDICARE OP, ALT 636 FOR OP/ED)

## 2024-07-19 RX ORDER — TRIAMTERENE/HYDROCHLOROTHIAZID 37.5-25 MG
1 TABLET ORAL DAILY
Status: DISCONTINUED | OUTPATIENT
Start: 2024-07-19 | End: 2024-07-30 | Stop reason: HOSPADM

## 2024-07-19 RX ORDER — OXYCODONE HYDROCHLORIDE 5 MG/1
5 TABLET ORAL EVERY 6 HOURS PRN
Status: DISCONTINUED | OUTPATIENT
Start: 2024-07-19 | End: 2024-07-23

## 2024-07-19 ASSESSMENT — PAIN SCALES - GENERAL
PAINLEVEL_OUTOF10: 6
PAINLEVEL_OUTOF10: 10 - WORST POSSIBLE PAIN
PAINLEVEL_OUTOF10: 10 - WORST POSSIBLE PAIN

## 2024-07-19 ASSESSMENT — COGNITIVE AND FUNCTIONAL STATUS - GENERAL
DAILY ACTIVITIY SCORE: 24
MOBILITY SCORE: 24

## 2024-07-19 ASSESSMENT — PAIN DESCRIPTION - LOCATION: LOCATION: TOE (COMMENT WHICH ONE)

## 2024-07-19 ASSESSMENT — ACTIVITIES OF DAILY LIVING (ADL): LACK_OF_TRANSPORTATION: NO

## 2024-07-19 ASSESSMENT — PAIN - FUNCTIONAL ASSESSMENT
PAIN_FUNCTIONAL_ASSESSMENT: 0-10
PAIN_FUNCTIONAL_ASSESSMENT: 0-10

## 2024-07-19 ASSESSMENT — PAIN DESCRIPTION - ORIENTATION: ORIENTATION: LEFT

## 2024-07-19 NOTE — CONSULTS
"Inpatient Diabetes Education Consult    Reason for Visit:  Ramya Muller is a 69 y.o. female who presents for ulcer of L great toe; T2DM    Consulting Service/Provider: Dr. Curtis    Visit Type: Initial visit    Visit Modality: In-person    Discharge Equipment/Supply Needs:       Patient has supplies at home: Blood glucose meter:  , Testing strips:  , and Lancets    Patient History and Assessment:  New diagnosis: N/A  Previous diagnosis: Type 2  Patient known to Diabetes Education department: No  Treatment prior to hospital admission: Oral medications metformin  Complications: nephropathy, peripheral vascular disease, chronic kidney disease, and obesity  PTA Medications:    Current Outpatient Medications   Medication Instructions    cholecalciferol (D3-2000) 2,000 Units, oral, Daily    DULoxetine (CYMBALTA) 30 mg, oral, Daily, Do not crush or chew.    multivitamin tablet 1 tablet, oral, Daily    oxyCODONE (ROXICODONE) 5 mg, oral, Every 6 hours PRN    triamterene-hydrochlorothiazid (Maxzide-25) 37.5-25 mg tablet 1 tablet, oral, Daily       Glucose   Date/Time Value Ref Range Status   07/19/2024 08:04  (H) 74 - 99 mg/dL Final   07/18/2024 11:51  (H) 74 - 99 mg/dL Final   07/06/2024 08:25  (H) 74 - 99 mg/dL Final     No results found for: \"CPEPTIDE\"  Hemoglobin A1C   Date Value Ref Range Status   07/18/2024 8.6 (H) see below % Final   04/15/2024 10.2 (H) 4.3 - 5.6 % Final     Comment:     American Diabetes Association guidelines indicate that patients with HgbA1c in the range 5.7-6.4% are at increased risk for development of diabetes, and intervention by lifestyle modification may be beneficial. HgbA1c greater or equal to 6.5% is considered diagnostic of diabetes.   01/18/2023 10.5 (H) 4.3 - 5.6 % Final     Comment:     American Diabetes Association guidelines indicate that patients with HgbA1c in the range 5.7-6.4% are at increased risk for development of diabetes, and intervention by lifestyle " modification may be beneficial. HgbA1c greater or equal to 6.5% is considered diagnostic of diabetes.   12/05/2022 8.6 (H) 4.3 - 5.6 % Final     Comment:     American Diabetes Association guidelines indicate that patients with HgbA1c in the range 5.7-6.4% are at increased risk for development of diabetes, and intervention by lifestyle modification may be beneficial. HgbA1c greater or equal to 6.5% is considered diagnostic of diabetes.   06/16/2022 12.9 (H) 4.3 - 5.6 % Final     Comment:     American Diabetes Association guidelines indicate that patients with HgbA1c in the range 5.7-6.4% are at increased risk for development of diabetes, and intervention by lifestyle modification may be beneficial. HgbA1c greater or equal to 6.5% is considered diagnostic of diabetes.       Patient Learning/Readiness Assessment:  Pt agreeable to diabetes education consult.     Interventions/Topics Covered:  See After Visit Summary for handouts/information sheets provided to patient.  Education Documentation  No documentation found.        Additional topics covered: Discussed most recent blood glucose values. States high bg for her at home are 150s. Monitors bg once daily (morning) at home. Discussed importance of 2x monitoring at varying times.     She is clear that she will not take insulin at home 2/2 to fear of needles. However, is interested in metformin alternative 2/2 GI distress. Reinforced education on effect of hyperglycemia on circulation.  Additional materials provided: N/A    CGM: TBD    Education Outcome/Recommendations:        Recommendations for bedside nursing:  N/A    Recommendations for Providers: Follow-up w/ PCP and/or Endocrinology    Additional Comments: Ms. Muller follows with endocrine at [facility]. She is agreeable to continued diabetes education visits.     Time spent: 20 minutes

## 2024-07-19 NOTE — PROGRESS NOTES
"   07/19/24 1013   Discharge Planning   Living Arrangements Alone   Support Systems Children   Assistance Needed Pending   Type of Residence Private residence   Do you have animals or pets at home? No   Who is requesting discharge planning? Patient   Home or Post Acute Services   (Pending)   Expected Discharge Disposition   (Pending)   Does the patient need discharge transport arranged? No  (Pt's dtr will provide transport home.)   Financial Resource Strain   How hard is it for you to pay for the very basics like food, housing, medical care, and heating? Not hard   Housing Stability   In the last 12 months, was there a time when you were not able to pay the mortgage or rent on time? N   At any time in the past 12 months, were you homeless or living in a shelter (including now)? N   Transportation Needs   In the past 12 months, has lack of transportation kept you from medical appointments or from getting medications? no   In the past 12 months, has lack of transportation kept you from meetings, work, or from getting things needed for daily living? No     Assessment Note:  Met with pt and introduced myself as care coordinator and member of the Care Transitions team for discharge planning.   Pt was independent prior to admission (denies use of assistive devices).  Pt works part-time and will need a return to work slip.  Pt believes she may need a cane due to pain with toe (\"loss of balance).  Pt uses [facility] for drs appts.  Pt's address, phone number and contact information was verified.  Pt does not have any other questions/concerns at this time.     Previous Home Care: None  DME: Cpap (pt describes as \"ancient\"), glucometer.  Pharmacy: [facility]  Falls: Denies  PCP:  [facility] main campus physician (cannot recall the name).  Last visit was earlier this year.     Brenda Romero MSN, RN-BC  Transitional Care Coordinator (TCC)  [phone]   "

## 2024-07-19 NOTE — PROGRESS NOTES
Vancomycin Dosing by Pharmacy- FOLLOW UP    Ramya Muller is a 69 y.o. year old female who Pharmacy has been consulted for vancomycin dosing for other Diabetic foot infection . Based on the patient's indication and renal status this patient is being dosed based on a goal AUC of 400-600.     Renal function is currently stable.    Current vancomycin dose: 750 mg given every 8 hours    Estimated vancomycin AUC on current dose: 420 mg/L.hr     Visit Vitals  /57 (BP Location: Right arm, Patient Position: Lying)   Pulse 85   Temp 36.1 °C (97 °F) (Temporal)   Resp 16        Lab Results   Component Value Date    CREATININE 0.59 2024    CREATININE 0.62 2024    CREATININE 0.65 2024        Patient weight is as follows:   Vitals:    24 1020   Weight: 88.5 kg (195 lb)       Cultures:  No results found for the encounter in last 14 days.       I/O last 3 completed shifts:  In: 590 (6.7 mL/kg) [P.O.:240; IV Piggyback:350]  Out: - (0 mL/kg)   Weight: 88.5 kg   I/O during current shift:  No intake/output data recorded.    Temp (24hrs), Av.5 °C (97.7 °F), Min:36 °C (96.8 °F), Max:36.9 °C (98.5 °F)      Assessment/Plan    Within goal AUC range. Continue current vancomycin regimen.    This dosing regimen is predicted by [employer] to result in the following pharmacokinetic parameters:  Loading dose: N/A  Regimen: 750 mg IV every 8 hours.  Start time: 14:03 on 2024  Exposure target: AUC24 (range)400-600 mg/L.hr   AUC24,ss: 420 mg/L.hr  Probability of AUC24 > 400: 59 %  Ctrough,ss: 14 mg/L  Probability of Ctrough,ss > 20: 13 %  Probability of nephrotoxicity (Lodise ALYCE ): 9 %      The next level will be obtained on  at am lab draw. May be obtained sooner if clinically indicated.   Will continue to monitor renal function daily while on vancomycin and order serum creatinine at least every 48 hours if not already ordered.  Follow for continued vancomycin needs, clinical response, and  signs/symptoms of toxicity.       Darwin Bowling, PharmD

## 2024-07-19 NOTE — PROGRESS NOTES
"Ramya Muller is a 69 y.o. female on day 1 of admission presenting with Diabetic ulcer of toe of left foot associated with diabetes mellitus of other type, unspecified ulcer stage (Multi).    Subjective   Patient was complaining of tightness and cramps in her bilateral calf muscles. She felt \"throbbing pain\" in her left toe due to \"infections\". No other active issue mentioned.        Objective     Physical Exam  - 1 by 1 cm wound with yellow necrotic core surrounded by erythematous and scaly skin. The wound was wet with pus and blood admixed.      - The great toe in general was tender to palpation but Probing was non painful. Probing revealed deep necrotic soft tissue up to the bone. Post probing, there was oozing of pus and blood.      - Both dorsalis pedis and posterior tibialis pulses were palpable bilaterally      - No skin color changes, no cuts / burns / bruises or signs of past trauma or injury.     - Varicose veins in B/L calves      - All toe nails had fungal infections     -  Decreased  pain, pressure and touch sensation on all toes.    -  Joint position sense decreased      Last Recorded Vitals  Blood pressure 101/57, pulse 85, temperature 36.1 °C (97 °F), temperature source Temporal, resp. rate 16, height 1.575 m (5' 2\"), weight 88.5 kg (195 lb), SpO2 98%.  Intake/Output last 3 Shifts:  I/O last 3 completed shifts:  In: 590 (6.7 mL/kg) [P.O.:240; IV Piggyback:350]  Out: - (0 mL/kg)   Weight: 88.5 kg     Relevant Results    HbA1c: 8.6 %     WBC: 10.2     Hb: 11.6  MCV: 80  MCH: 25.6    Xray left foot:   Osteomyelitis of the proximal and distal phalanx of the 1st digit.     MRI left foot:  1st interphalangeal region soft tissue ulcer with subjacent  osteomyelitis of the 1st distal phalanx and distal aspect of 1st  proximal phalanx. There is an additional medial fluid collection  suspected with internal soft tissue gas which may reflect a tiny  superficial abscess. Cellulitis of left foot.        Iron " studies: Pending     Assessment/Plan   Principal Problem:    Diabetic ulcer of toe of left foot associated with diabetes mellitus of other type, unspecified ulcer stage (Multi)    #Diabetic foot with osteomyelitis  - Started on emperic antibiotics (VANC AND PIP/HEBERT)   - Wound and blood culture results awaiting  - Distal neurovascular status check q12   - Podiatry consult pending for possible debridement     #DM  - Hb A1c 8.6, persistent hyperglycemia on POCT glucose   - Start on sliding scale insulin      #Fibromyalgia and diabetic neuropathy  - C/W home meds Duloxetine     #AFIB  - Patient is unaware of this diagnosis. No record of rate/rhythm control meds.   - EKG normal, no palpitations      #Lower leg cramps  - Iron profile for possible ALANA and RLS   - DVT prophylaxis ongoing   - Awaiting B/L LL venous duplex   - If all negative, Start cyclobenzaprine            Edgar Gaviria MD

## 2024-07-19 NOTE — CONSULTS
Wound Care Consult     Visit Date: 7/19/2024      Patient Name: Ramya Muller         MRN: [id number]           YOB: 1955     Reason for Consult: Left great toe       Wound Assessment:  Wound 07/18/24 Toe (Comment  which one) Dorsal foot;Left (Active)   Site Assessment Dry;Painful;Black 07/19/24 0801   Wound Length (cm) 2 cm 07/18/24 1752   Wound Width (cm) 1 cm 07/18/24 1752   Wound Surface Area (cm^2) 2 cm^2 07/18/24 1752   Drainage Description Red 07/19/24 0801   Drainage Amount Small 07/19/24 0801   Dressing Gauze 07/19/24 0801   Dressing Changed Reinforced 07/19/24 0801   Dressing Status Clean;Dry 07/19/24 0801     Wound care at bedside for wound assessment, patient is off the floor for testing. Image in EMR reviewed. HIGHLY recommend podiatry consult for assessment and treatment. Primary provider, please review recommendation. If you agree with recommendation please enter as wound orders in EMR. Thank you.      Hazel Talbert RN, CWON  7/19/2024  2:42 PM

## 2024-07-19 NOTE — PROGRESS NOTES
Pharmacy Admission Order Reconciliation Review    Ramya Muller is a 69 y.o. female admitted for Diabetic ulcer of toe of left foot associated with diabetes mellitus of other type, unspecified ulcer stage (Multi). Pharmacy reviewed the patient's unreconciled admission medications.    Prior to admission medications that were reviewed and acted on by the pharmacist include:  Vitamin D3  Cymbalta  MVI  These medications have been reconciled.     Any other unreconcilied medications have been addressed and will be ordered or held by the patient's medical team. Medications addressed by the pharmacist may be added or changed by the patient's medical team at any time.    Rekha Cope, PharmD  Transitions of Care Pharmacist  [facility]  Please reach out via Secure Chat for questions

## 2024-07-20 LAB
ALBUMIN SERPL BCP-MCNC: 3.4 G/DL (ref 3.4–5)
ANION GAP SERPL CALC-SCNC: 14 MMOL/L (ref 10–20)
BASOPHILS # BLD AUTO: 0.04 X10*3/UL (ref 0–0.1)
BASOPHILS NFR BLD AUTO: 0.4 %
BUN SERPL-MCNC: 14 MG/DL (ref 6–23)
CALCIUM SERPL-MCNC: 8.6 MG/DL (ref 8.6–10.6)
CHLORIDE SERPL-SCNC: 98 MMOL/L (ref 98–107)
CO2 SERPL-SCNC: 25 MMOL/L (ref 21–32)
CREAT SERPL-MCNC: 0.59 MG/DL (ref 0.5–1.05)
EGFRCR SERPLBLD CKD-EPI 2021: >90 ML/MIN/1.73M*2
EOSINOPHIL # BLD AUTO: 0.21 X10*3/UL (ref 0–0.7)
EOSINOPHIL NFR BLD AUTO: 2.3 %
ERYTHROCYTE [DISTWIDTH] IN BLOOD BY AUTOMATED COUNT: 11.8 % (ref 11.5–14.5)
GLUCOSE BLD MANUAL STRIP-MCNC: 116 MG/DL (ref 74–99)
GLUCOSE BLD MANUAL STRIP-MCNC: 123 MG/DL (ref 74–99)
GLUCOSE BLD MANUAL STRIP-MCNC: 162 MG/DL (ref 74–99)
GLUCOSE SERPL-MCNC: 166 MG/DL (ref 74–99)
HCT VFR BLD AUTO: 33.5 % (ref 36–46)
HGB BLD-MCNC: 10.3 G/DL (ref 12–16)
IMM GRANULOCYTES # BLD AUTO: 0.04 X10*3/UL (ref 0–0.7)
IMM GRANULOCYTES NFR BLD AUTO: 0.4 % (ref 0–0.9)
LYMPHOCYTES # BLD AUTO: 1.29 X10*3/UL (ref 1.2–4.8)
LYMPHOCYTES NFR BLD AUTO: 13.8 %
MAGNESIUM SERPL-MCNC: 2.2 MG/DL (ref 1.6–2.4)
MCH RBC QN AUTO: 25.8 PG (ref 26–34)
MCHC RBC AUTO-ENTMCNC: 30.7 G/DL (ref 32–36)
MCV RBC AUTO: 84 FL (ref 80–100)
MONOCYTES # BLD AUTO: 0.74 X10*3/UL (ref 0.1–1)
MONOCYTES NFR BLD AUTO: 7.9 %
NEUTROPHILS # BLD AUTO: 7 X10*3/UL (ref 1.2–7.7)
NEUTROPHILS NFR BLD AUTO: 75.2 %
NRBC BLD-RTO: 0 /100 WBCS (ref 0–0)
PHOSPHATE SERPL-MCNC: 3.2 MG/DL (ref 2.5–4.9)
PLATELET # BLD AUTO: 220 X10*3/UL (ref 150–450)
POTASSIUM SERPL-SCNC: 3.7 MMOL/L (ref 3.5–5.3)
RBC # BLD AUTO: 3.99 X10*6/UL (ref 4–5.2)
SODIUM SERPL-SCNC: 133 MMOL/L (ref 136–145)
WBC # BLD AUTO: 9.3 X10*3/UL (ref 4.4–11.3)

## 2024-07-20 PROCEDURE — 83735 ASSAY OF MAGNESIUM: CPT

## 2024-07-20 PROCEDURE — 2500000004 HC RX 250 GENERAL PHARMACY W/ HCPCS (ALT 636 FOR OP/ED)

## 2024-07-20 PROCEDURE — 36415 COLL VENOUS BLD VENIPUNCTURE: CPT

## 2024-07-20 PROCEDURE — 2500000002 HC RX 250 W HCPCS SELF ADMINISTERED DRUGS (ALT 637 FOR MEDICARE OP, ALT 636 FOR OP/ED)

## 2024-07-20 PROCEDURE — 2500000001 HC RX 250 WO HCPCS SELF ADMINISTERED DRUGS (ALT 637 FOR MEDICARE OP)

## 2024-07-20 PROCEDURE — 99232 SBSQ HOSP IP/OBS MODERATE 35: CPT

## 2024-07-20 PROCEDURE — 82947 ASSAY GLUCOSE BLOOD QUANT: CPT

## 2024-07-20 PROCEDURE — 80069 RENAL FUNCTION PANEL: CPT

## 2024-07-20 PROCEDURE — 2500000004 HC RX 250 GENERAL PHARMACY W/ HCPCS (ALT 636 FOR OP/ED): Performed by: PHARMACIST

## 2024-07-20 PROCEDURE — 85025 COMPLETE CBC W/AUTO DIFF WBC: CPT

## 2024-07-20 PROCEDURE — 1100000001 HC PRIVATE ROOM DAILY

## 2024-07-20 RX ORDER — POLYETHYLENE GLYCOL 3350 17 G/17G
17 POWDER, FOR SOLUTION ORAL DAILY
Status: DISCONTINUED | OUTPATIENT
Start: 2024-07-20 | End: 2024-07-28

## 2024-07-20 ASSESSMENT — COGNITIVE AND FUNCTIONAL STATUS - GENERAL
MOBILITY SCORE: 24
DAILY ACTIVITIY SCORE: 24

## 2024-07-20 ASSESSMENT — PAIN - FUNCTIONAL ASSESSMENT
PAIN_FUNCTIONAL_ASSESSMENT: 0-10

## 2024-07-20 ASSESSMENT — PAIN SCALES - GENERAL
PAINLEVEL_OUTOF10: 8
PAINLEVEL_OUTOF10: 0 - NO PAIN
PAINLEVEL_OUTOF10: 10 - WORST POSSIBLE PAIN

## 2024-07-20 NOTE — CARE PLAN
The patient's goals for the shift include Patient willreceived IV antibiotics as ordered this shift    The clinical goals for the shift include Patient will rate pain at/or below 4 this shift      Problem: Pain  Goal: Takes deep breaths with improved pain control throughout the shift  Outcome: Progressing  Goal: Turns in bed with improved pain control throughout the shift  Outcome: Progressing  Goal: Walks with improved pain control throughout the shift  Outcome: Progressing  Goal: Performs ADL's with improved pain control throughout shift  Outcome: Progressing  Goal: Participates in PT with improved pain control throughout the shift  Outcome: Progressing  Goal: Free from opioid side effects throughout the shift  Outcome: Progressing  Goal: Free from acute confusion related to pain meds throughout the shift  Outcome: Progressing

## 2024-07-20 NOTE — PROGRESS NOTES
Ramya Muller is a 69 y.o. female on day 2 of admission presenting with Diabetic ulcer of toe of left foot associated with diabetes mellitus of other type, unspecified ulcer stage (Multi).      Subjective   No acute events overnight. Patient was informed that podiatry will have to be consulted on Monday and in meantime she is being treated with IV antibiotics. She is agreeable with plan. Her pain is present in her L great toe but manageable with pain medication regimen. She mentioned she has not had a bowel movement since being at the hospital and is interested in a laxative medication now. Otherwise she denies any new or worsening symptoms.       Objective     Constitutional: Well-developed female in moderate pain, pleasant, nontoxic appearing  Neuro: Aox4, cooperative  HEENT: Normocephalic, atraumatic. EOMI.  Respiratory: CTA bilaterally. No wheezes, rales, or rhonchi. Normal respiratory effort.  Cardiovascular: RRR. No murmurs, gallops, or rubs. No JVD. Radial pulses 2+.  Abdominal: Soft, nondistended, nontender to palpation. Bowel sounds present. No hepatosplenomegaly or masses. No CVA tenderness.  L distal extremity: deep ulcerated wound on L 1st digit with necrotic tissue and surrounding erythema, small amount of purulence, edema, warmth, and TTP. No crepitus. Edema and warmth up to midfoot. 2+ DP pulse, able to move toes and foot, sensation impaired in digits 1-5 and present everywhere else.  R distal extremity: 2+ DP pulses, motor function, sensation intact. Onchomycosis.  Psych: Appropriate mood and affect.     Last Recorded Vitals  /57   Pulse 87   Temp 36.9 °C (98.4 °F)   Resp 18   Wt 88.5 kg (195 lb)   SpO2 96%   Intake/Output last 3 Shifts:    Intake/Output Summary (Last 24 hours) at 7/20/2024 0713  Last data filed at 7/19/2024 1843  Gross per 24 hour   Intake 480 ml   Output --   Net 480 ml       Admission Weight  Weight: 88.5 kg (195 lb) (07/18/24 1020)    Daily Weight  07/18/24 : 88.5 kg  (195 lb)    Image Results  Lower extremity venous duplex bilateral  Narrative: Interpreted By:  Giancarlo Castorena and Calo Sean-Matthew   STUDY:  [facility] LOWER EXTREMITY VENOUS DUPLEX BILATERAL;  7/19/2024 3:10 pm      INDICATION:  Signs/Symptoms:Risk for DVT, complaining of bilateral calf pain /  cramps and tightness.      COMPARISON:  None.      ACCESSION NUMBER(S):  [id number]      ORDERING CLINICIAN:  AASHISH NEWSOME      TECHNIQUE:  Vascular ultrasound of the bilateral lower extremities was performed.  Real-time compression views as well as Gray scale, color Doppler and  spectral Doppler waveform analysis was performed.      FINDINGS:  Evaluation of the visualized portions of the bilateral common  femoral, proximal, mid, and distal femoral, and popliteal veins was  performed.  Evaluation of the visualized portions of the bilateral  posterior tibial and left peroneal veins was also performed.      Limitations: None.      The evaluated veins demonstrate normal compressibility. There is  intact venous flow demonstrating normal respiratory variability and  normal augmentation of flow with calf compression.      Impression: No sonographic evidence for deep vein thrombosis within the evaluated  veins of the bilateral lower extremities.      I personally reviewed the images/study and I agree with the findings  as stated by Margarita Ng MD. This study was interpreted at  [place], [place].      MACRO:  None      Signed by: Giancarlo Castorena 7/19/2024 3:46 PM  Dictation workstation:   [id number]  MR foot left wo IV contrast  Narrative: Interpreted By:  Turner Salcedo and Baker Zachary   STUDY:  MRI of the left foot and ankle without IV contrast; 7/19/2024 1:17 am      INDICATION:  Signs/Symptoms:Diabetic foot with osteomyelitis with edema in whole  left foot.      COMPARISON:  Left foot radiographs on 07/18/2024.      ACCESSION NUMBER(S):  [id number]       ORDERING CLINICIAN:  JERROD GONZALEZ      TECHNIQUE:  MR imaging of the  left foot and ankle was obtained  without  administration of intravenous contrast medium.      FINDINGS:  TENDONS:  There is Achilles tendinosis. This peroneal tendinosis. There  thickening of the plantar fascia. The remainder of the tendons are  intact.      LIGAMENTS:  The major ligamentous structures of the forefoot are normal. The  Lisfranc ligament is normal.      JOINTS/OSSEOUS STRUCTURES:  There is T1 hypointense signal with associated T2/STIR hyperintensity  involving the entirety of the 1st distal phalanx, consistent with  osteomyelitis. Additional areas of irregular T1 hypointensity with  associated T2/STIR hyperintensity throughout the mid to distal 1st  proximal phalanx, likely representing additional site of  osteomyelitis. There is also focus of T2 hyperintense signal with  associated T1 hypointense signal within distal 1st metatarsal (series  10, image 16), favored to to be degenerative in nature. There is no  evidence of significant joint effusion in the forefoot or midfoot.  There is no evidence of acute fracture or dislocation.      SOFT TISSUES:  There is skin surface irregularity and ulceration along the medial  aspect of the distal 1st digit with significant soft tissue edema  about the entirety of the 1st digit. There is a questionable  relatively conglomerate focus of T2 hyperintense signal at the  plantar medial aspect of the 1st proximal phalangeal head measuring  approximately 14 x 11 mm (series 10, image 16). This is very  superficial and likely contiguous with the sinus tract. There is  generalized atrophy of the musculature of the foot. There is  additionally increased T2 signal intensity throughout the  musculature. There is edema throughout the subcutaneous soft tissues  of the visualized foot.      Impression: 1. 1st interphalangeal region soft tissue ulcer with subjacent  osteomyelitis of the 1st distal phalanx  and distal aspect of 1st  proximal phalanx. There is an additional medial fluid collection  suspected with internal soft tissue gas which may reflect a tiny  superficial abscess.  2. Increased T2 signal throughout the subcutaneous soft tissues of  the visualized lower extremity, consistent with generalized edema  and/or cellulitis.  3. 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      I personally reviewed the images/study and I agree with the findings  as stated. This study was interpreted at [facility], [place], [place].      MACRO:  None      Signed by: Turner Salcedo 7/19/2024 9:19 AM  Dictation workstation:   [id number]        Relevant Results               Assessment/Plan        Principal Problem:    Diabetic ulcer of toe of left foot associated with diabetes mellitus of other type, unspecified ulcer stage (Multi)    Ramya Muller is a 69 y.o. female with poorly controlled T2DM (A1c 8%, 4/15/2024) initially treated for a diabetic foot soft tissue infection of the L 1st digit now presenting with worsening pain and with nonhealing ulcer that probes to bone with MRI L foot confirming osteomyelitis of L great toe. with involvement of distal metatarsal w/o fluid collection (septic arthritis w/o drainable pocket).     Updates 7/21  -D/c'd ibuprofen 600 mg tid given risk of JOYCE. Creatinine/GFR currently wnl  -C/w IV vanc/zosyn for osteo while awaiting final eval/tx recs from podiatry when consulted on Monday. Currently unable to take consults over the weekend. Pt however is stable and nontoxic, on IV abx. If no plans for amputation/debridement, then c/s infectious disease for abx management recs  -Negative DVT US BLE 7/19/24  -Iron studies consistent with anemia of chronic disease (iron low, transferrin WNL; ferritin elevated)     #L 1st digit diabetic ulcer  #Osteomyelitis  #Septic arthritis w/o drainable pocket  PLAN  -blood cxs 7/19:  NGTD  -Vancomycin (7/18-)  -Zosyn 4.5 g q6h (7/18-)  -Pain control: duloxetine 30 mg daily (home med); oxycodone 5 mg q6h prn  -Podiatry consult on Monday;     #T2DM c/b neuropathy  A1c 8.6%; BG WNL  PLAN  -SSI #1 for now, required 1 unit/daily  -hypoglycemia protocol    #Fibromyalgia and diabetic neuropathy  - C/W home Duloxetine      #AFIB, resolved  - Chart diagnosis. Patient is unaware of this diagnosis. No record of rate/rhythm control meds. EKG normal, no palpitations      #Lower leg cramps  - Iron profile c/w ACD; consider iron repletion for RLS tx when infection resolves  - DVT US BLE 7/19 negative     F: prn  E: prn  N: regular  A: PIV  DVT: lovenox  GI: not indicated     Bowel Regimen: miralax azeem daily  Code Status: Full code (confirmed on admission)  NOK: Jennifer (daughter): [phone]         Alexander Jorgensen MD

## 2024-07-21 VITALS
HEIGHT: 62 IN | SYSTOLIC BLOOD PRESSURE: 119 MMHG | RESPIRATION RATE: 18 BRPM | BODY MASS INDEX: 35.88 KG/M2 | DIASTOLIC BLOOD PRESSURE: 73 MMHG | WEIGHT: 195 LBS | OXYGEN SATURATION: 94 % | TEMPERATURE: 98.6 F | HEART RATE: 63 BPM

## 2024-07-21 LAB
ABO GROUP (TYPE) IN BLOOD: NORMAL
ANTIBODY SCREEN: NORMAL
BACTERIA BLD CULT: NORMAL
BASOPHILS # BLD AUTO: 0.09 X10*3/UL (ref 0–0.1)
BASOPHILS NFR BLD AUTO: 0.8 %
CHOLEST SERPL-MCNC: 193 MG/DL (ref 0–199)
CHOLESTEROL/HDL RATIO: 6.5
EOSINOPHIL # BLD AUTO: 0.3 X10*3/UL (ref 0–0.7)
EOSINOPHIL NFR BLD AUTO: 2.5 %
ERYTHROCYTE [DISTWIDTH] IN BLOOD BY AUTOMATED COUNT: 11.6 % (ref 11.5–14.5)
GLUCOSE BLD MANUAL STRIP-MCNC: 123 MG/DL (ref 74–99)
GLUCOSE BLD MANUAL STRIP-MCNC: 123 MG/DL (ref 74–99)
GLUCOSE BLD MANUAL STRIP-MCNC: 135 MG/DL (ref 74–99)
GLUCOSE BLD MANUAL STRIP-MCNC: 137 MG/DL (ref 74–99)
GLUCOSE BLD MANUAL STRIP-MCNC: 143 MG/DL (ref 74–99)
HCT VFR BLD AUTO: 33.5 % (ref 36–46)
HDLC SERPL-MCNC: 29.7 MG/DL
HGB BLD-MCNC: 10.9 G/DL (ref 12–16)
IMM GRANULOCYTES # BLD AUTO: 0.07 X10*3/UL (ref 0–0.7)
IMM GRANULOCYTES NFR BLD AUTO: 0.6 % (ref 0–0.9)
LDLC SERPL CALC-MCNC: 137 MG/DL
LYMPHOCYTES # BLD AUTO: 1.96 X10*3/UL (ref 1.2–4.8)
LYMPHOCYTES NFR BLD AUTO: 16.5 %
MCH RBC QN AUTO: 25.6 PG (ref 26–34)
MCHC RBC AUTO-ENTMCNC: 32.5 G/DL (ref 32–36)
MCV RBC AUTO: 79 FL (ref 80–100)
MONOCYTES # BLD AUTO: 0.9 X10*3/UL (ref 0.1–1)
MONOCYTES NFR BLD AUTO: 7.6 %
NEUTROPHILS # BLD AUTO: 8.59 X10*3/UL (ref 1.2–7.7)
NEUTROPHILS NFR BLD AUTO: 72 %
NON HDL CHOLESTEROL: 163 MG/DL (ref 0–149)
NRBC BLD-RTO: 0 /100 WBCS (ref 0–0)
PLATELET # BLD AUTO: 287 X10*3/UL (ref 150–450)
RBC # BLD AUTO: 4.25 X10*6/UL (ref 4–5.2)
RH FACTOR (ANTIGEN D): NORMAL
TRIGL SERPL-MCNC: 131 MG/DL (ref 0–149)
VLDL: 26 MG/DL (ref 0–40)
WBC # BLD AUTO: 11.9 X10*3/UL (ref 4.4–11.3)

## 2024-07-21 PROCEDURE — 80061 LIPID PANEL: CPT

## 2024-07-21 PROCEDURE — 2500000001 HC RX 250 WO HCPCS SELF ADMINISTERED DRUGS (ALT 637 FOR MEDICARE OP)

## 2024-07-21 PROCEDURE — 2500000004 HC RX 250 GENERAL PHARMACY W/ HCPCS (ALT 636 FOR OP/ED)

## 2024-07-21 PROCEDURE — 36415 COLL VENOUS BLD VENIPUNCTURE: CPT

## 2024-07-21 PROCEDURE — 1100000001 HC PRIVATE ROOM DAILY

## 2024-07-21 PROCEDURE — 2500000004 HC RX 250 GENERAL PHARMACY W/ HCPCS (ALT 636 FOR OP/ED): Performed by: PHARMACIST

## 2024-07-21 PROCEDURE — 99232 SBSQ HOSP IP/OBS MODERATE 35: CPT

## 2024-07-21 PROCEDURE — 85025 COMPLETE CBC W/AUTO DIFF WBC: CPT

## 2024-07-21 PROCEDURE — 86901 BLOOD TYPING SEROLOGIC RH(D): CPT

## 2024-07-21 PROCEDURE — 82947 ASSAY GLUCOSE BLOOD QUANT: CPT

## 2024-07-21 RX ORDER — GABAPENTIN 300 MG/1
300 CAPSULE ORAL NIGHTLY
Status: CANCELLED | OUTPATIENT
Start: 2024-07-21

## 2024-07-21 RX ORDER — POLYETHYLENE GLYCOL 3350 17 G/17G
17 POWDER, FOR SOLUTION ORAL DAILY
Qty: 30 PACKET | Refills: 0 | Status: SHIPPED | OUTPATIENT
Start: 2024-07-21 | End: 2024-07-27

## 2024-07-21 ASSESSMENT — COGNITIVE AND FUNCTIONAL STATUS - GENERAL
DAILY ACTIVITIY SCORE: 24
MOBILITY SCORE: 24

## 2024-07-21 ASSESSMENT — PAIN - FUNCTIONAL ASSESSMENT
PAIN_FUNCTIONAL_ASSESSMENT: 0-10

## 2024-07-21 ASSESSMENT — PAIN SCALES - GENERAL
PAINLEVEL_OUTOF10: 0 - NO PAIN
PAINLEVEL_OUTOF10: 7
PAINLEVEL_OUTOF10: 8
PAINLEVEL_OUTOF10: 6
PAINLEVEL_OUTOF10: 6

## 2024-07-21 ASSESSMENT — PAIN DESCRIPTION - LOCATION
LOCATION: TOE (COMMENT WHICH ONE)
LOCATION: TOE (COMMENT WHICH ONE)

## 2024-07-21 NOTE — CARE PLAN
The patient's goals for the shift include Patient willreceived IV antibiotics as ordered this shift    The clinical goals for the shift include pt will remain free from pain this shift -TR    Over the shift, the patient did not make progress toward the following goals. Barriers to progression include   . Recommendations to address these barriers include   .

## 2024-07-21 NOTE — PROGRESS NOTES
"  MEDICINE INPATIENT PROGRESS NOTE      Ramya Muller is a 69 y.o. female on day 3 of admission presenting with Diabetic ulcer of toe of left foot associated with diabetes mellitus of other type, unspecified ulcer stage (Multi).    Subjective   No acute events overnight. Patient reports a 9/10 pain improved from \"11/10\" pain on admission. She states pain medicine regimen helps significantly for the pain. She otherwise reports a bowel movement yesterday. She is eating/drinking without issue. She denies any other acute or worsening issues at this time. Nursing changed wound dressing yesterday.         Objective     Last Recorded Vitals  Blood pressure 103/69, pulse 100, temperature 37 °C (98.6 °F), resp. rate 18, height 1.575 m (5' 2\"), weight 88.5 kg (195 lb), SpO2 98%.  Intake/Output last 3 Shifts:  I/O last 3 completed shifts:  In: 480 (5.4 mL/kg) [P.O.:480]  Out: - (0 mL/kg)   Weight: 88.5 kg       Physical exam  Constitutional: Well-developed female in moderate pain, pleasant, nontoxic appearing  Neuro: Aox4, cooperative  HEENT: Normocephalic, atraumatic. EOMI.  Respiratory: CTA bilaterally. No wheezes, rales, or rhonchi. Normal respiratory effort.  Cardiovascular: RRR. No murmurs, gallops, or rubs. No JVD. Radial pulses 2+.  Abdominal: Soft, nondistended, nontender to palpation. Bowel sounds present. No hepatosplenomegaly or masses. No CVA tenderness.  L distal extremity: deep ulcerated wound on L 1st digit with necrotic tissue and surrounding erythema, small amount of purulence, edema, warmth, and TTP. No crepitus. Edema and warmth up to midfoot. 2+ DP pulse, able to move toes and foot, sensation impaired in digits 1-5 and present everywhere else.  R distal extremity: 2+ DP pulses, motor function, sensation intact. Onchomycosis.  Psych: Appropriate mood and affect.       Relevant Results    Labs    Results from last 7 days   Lab Units 07/20/24  0915 07/19/24  0804 07/18/24  1151   WBC AUTO x10*3/uL 9.3 8.0 " "10.2   HEMOGLOBIN g/dL 10.3* 11.1* 11.6*   HEMATOCRIT % 33.5* 35.9* 36.2   PLATELETS AUTO x10*3/uL 220 242 242            Results from last 7 days   Lab Units 07/20/24  0915 07/19/24  0804 07/18/24  1151   SODIUM mmol/L 133* 136 138   POTASSIUM mmol/L 3.7 3.7 3.7   CO2 mmol/L 25 25 26   ANION GAP mmol/L 14 14 15   BUN mg/dL 14 20 20   CREATININE mg/dL 0.59 0.59 0.62   GLUCOSE mg/dL 166* 130* 124*   EGFR mL/min/1.73m*2 >90 >90 >90   MAGNESIUM mg/dL 2.20  --  1.98   PHOSPHORUS mg/dL 3.2 3.4  --       Results from last 7 days   Lab Units 07/18/24  1151   ALT U/L 13   AST U/L 21   ALK PHOS U/L 79            No lab exists for component: \"APTTT\"         No lab exists for component: \"BNPRESU\", \"CPKT\"  Results from last 7 days   Lab Units 07/18/24  1151   LACTATE mmol/L 0.9        Medications  Scheduled medications  DULoxetine, 30 mg, oral, Daily  enoxaparin, 40 mg, subcutaneous, q24h  insulin lispro, 0-5 Units, subcutaneous, TID  piperacillin-tazobactam, 4.5 g, intravenous, q6h  polyethylene glycol, 17 g, oral, Daily  triamterene-hydrochlorothiazid, 1 tablet, oral, Daily  vancomycin, 750 mg, intravenous, q8h      Continuous medications     PRN medications  PRN medications: dextrose, dextrose, glucagon, glucagon, oxyCODONE, vancomycin     Imaging        No results found for the last 90 days.         No lab exists for component: \"AGALPCRNB\" [unfilled]          Assessment/Plan     Ramya Muller is a 69 y.o. female with poorly controlled T2DM (A1c 8%, 4/15/2024) initially treated for a diabetic foot soft tissue infection of the L 1st digit now presenting with worsening pain and with nonhealing ulcer that probes to bone with MRI L foot confirming osteomyelitis of L great toe. with involvement of distal metatarsal w/o fluid collection (septic arthritis w/o drainable pocket).        Updates 7/22  -C/w IV vanc/zosyn for osteo while awaiting final eval/tx recs from podiatry when consulted on Monday. Currently unable to take " consults over the weekend. Pt however is stable and nontoxic, on IV abx. If no plans for amputation/debridement, then c/s infectious disease for abx management recs       #L 1st digit diabetic ulcer  #Osteomyelitis  #Septic arthritis w/o drainable pocket  PLAN  -blood cxs 7/19: NGTD  -Vancomycin (7/18-)  -Zosyn 4.5 g q6h (7/18-)  -Pain control: duloxetine 30 mg daily (home med); oxycodone 5 mg q6h prn  -Podiatry consult on Monday;  -Podiatry OP follow up on discharge     #T2DM c/b neuropathy  A1c 8.6%; BG WNL  PLAN  -SSI #1 for now, required 1 unit/daily  -hypoglycemia protocol  -Endo and PCP OP follow up on discharge  -Initiate antihyperglycemic agent on discharge: sitagliptin 100 mg/daily  -ASCVD risk at least 13.3%; high intensity statin recommended. Discuss with patient regarding starting and discharging on statin  -Lipid panel ordered; no record of labs     #Fibromyalgia and diabetic neuropathy  - C/w home Duloxetine 30 mg daily     #AFIB, resolved  - Chart diagnosis. Patient is unaware of this diagnosis. No record of rate/rhythm control meds. EKG normal, no palpitations      #Lower leg cramps  - Iron profile c/w ACD; consider iron repletion for RLS tx when infection resolves  - DVT US BLE 7/19 negative    #ACD  -Iron studies consistent with anemia of chronic disease (iron low, transferrin WNL; ferritin elevated)  PLAN  -See above for diabetic OM     F: prn  E: prn  N: regular  A: PIV  DVT: lovenox  GI: not indicated     Bowel Regimen: miralax azeem daily  Code Status: Full code (confirmed on admission)  NOK: Jennifer (daughter): [phone]    Case seen and discussed with attending Dr. Hooks, to addend as necessary.    Alexander Jorgensen MD PGY-2

## 2024-07-21 NOTE — CARE PLAN
The patient is calm and cooperative with care, complaints of pain, medication administered with decrease in pain.    Problem: Pain  Goal: Takes deep breaths with improved pain control throughout the shift  Outcome: Progressing  Goal: Turns in bed with improved pain control throughout the shift  Outcome: Progressing  Goal: Walks with improved pain control throughout the shift  Outcome: Progressing  Goal: Performs ADL's with improved pain control throughout shift  Outcome: Progressing   The patient's goals for the shift include Patient willreceived IV antibiotics as ordered this shift    The clinical goals for the shift include patient will rate pain 5 or below by the end of the shift    Over the shift, the patient did  make progress toward the following goals.

## 2024-07-22 PROBLEM — E11.51 TYPE 2 DIABETES MELLITUS WITH DIABETIC PERIPHERAL ANGIOPATHY WITHOUT GANGRENE, WITHOUT LONG-TERM CURRENT USE OF INSULIN (MULTI): Status: ACTIVE | Noted: 2024-07-22

## 2024-07-22 PROBLEM — M86.9 PYOGENIC INFLAMMATION OF BONE (MULTI): Status: ACTIVE | Noted: 2024-07-18

## 2024-07-22 LAB
ATRIAL RATE: 85 BPM
BACTERIA BLD CULT: NORMAL
BACTERIA BLD CULT: NORMAL
BASOPHILS # BLD AUTO: 0.09 X10*3/UL (ref 0–0.1)
BASOPHILS NFR BLD AUTO: 0.8 %
EOSINOPHIL # BLD AUTO: 0.24 X10*3/UL (ref 0–0.7)
EOSINOPHIL NFR BLD AUTO: 2.2 %
ERYTHROCYTE [DISTWIDTH] IN BLOOD BY AUTOMATED COUNT: 11.7 % (ref 11.5–14.5)
GLUCOSE BLD MANUAL STRIP-MCNC: 131 MG/DL (ref 74–99)
GLUCOSE BLD MANUAL STRIP-MCNC: 136 MG/DL (ref 74–99)
GLUCOSE BLD MANUAL STRIP-MCNC: 182 MG/DL (ref 74–99)
GLUCOSE BLD MANUAL STRIP-MCNC: 211 MG/DL (ref 74–99)
HCT VFR BLD AUTO: 36.1 % (ref 36–46)
HGB BLD-MCNC: 11.5 G/DL (ref 12–16)
IMM GRANULOCYTES # BLD AUTO: 0.09 X10*3/UL (ref 0–0.7)
IMM GRANULOCYTES NFR BLD AUTO: 0.8 % (ref 0–0.9)
LYMPHOCYTES # BLD AUTO: 1.46 X10*3/UL (ref 1.2–4.8)
LYMPHOCYTES NFR BLD AUTO: 13.5 %
MCH RBC QN AUTO: 25.7 PG (ref 26–34)
MCHC RBC AUTO-ENTMCNC: 31.9 G/DL (ref 32–36)
MCV RBC AUTO: 81 FL (ref 80–100)
MONOCYTES # BLD AUTO: 0.89 X10*3/UL (ref 0.1–1)
MONOCYTES NFR BLD AUTO: 8.2 %
NEUTROPHILS # BLD AUTO: 8.06 X10*3/UL (ref 1.2–7.7)
NEUTROPHILS NFR BLD AUTO: 74.5 %
NRBC BLD-RTO: 0 /100 WBCS (ref 0–0)
P AXIS: 44 DEGREES
P OFFSET: 201 MS
P ONSET: 149 MS
PLATELET # BLD AUTO: 280 X10*3/UL (ref 150–450)
PR INTERVAL: 144 MS
Q ONSET: 221 MS
QRS COUNT: 14 BEATS
QRS DURATION: 144 MS
QT INTERVAL: 410 MS
QTC CALCULATION(BAZETT): 487 MS
QTC FREDERICIA: 460 MS
R AXIS: 88 DEGREES
RBC # BLD AUTO: 4.47 X10*6/UL (ref 4–5.2)
T AXIS: 11 DEGREES
T OFFSET: 426 MS
VANCOMYCIN SERPL-MCNC: 11.8 UG/ML (ref 5–20)
VENTRICULAR RATE: 85 BPM
WBC # BLD AUTO: 10.8 X10*3/UL (ref 4.4–11.3)

## 2024-07-22 PROCEDURE — 2500000002 HC RX 250 W HCPCS SELF ADMINISTERED DRUGS (ALT 637 FOR MEDICARE OP, ALT 636 FOR OP/ED)

## 2024-07-22 PROCEDURE — 85025 COMPLETE CBC W/AUTO DIFF WBC: CPT

## 2024-07-22 PROCEDURE — 2500000004 HC RX 250 GENERAL PHARMACY W/ HCPCS (ALT 636 FOR OP/ED): Performed by: PHARMACIST

## 2024-07-22 PROCEDURE — 2500000004 HC RX 250 GENERAL PHARMACY W/ HCPCS (ALT 636 FOR OP/ED)

## 2024-07-22 PROCEDURE — 2500000001 HC RX 250 WO HCPCS SELF ADMINISTERED DRUGS (ALT 637 FOR MEDICARE OP)

## 2024-07-22 PROCEDURE — 99232 SBSQ HOSP IP/OBS MODERATE 35: CPT

## 2024-07-22 PROCEDURE — 1100000001 HC PRIVATE ROOM DAILY

## 2024-07-22 PROCEDURE — 82947 ASSAY GLUCOSE BLOOD QUANT: CPT

## 2024-07-22 PROCEDURE — 36415 COLL VENOUS BLD VENIPUNCTURE: CPT

## 2024-07-22 PROCEDURE — 2500000005 HC RX 250 GENERAL PHARMACY W/O HCPCS

## 2024-07-22 PROCEDURE — 80202 ASSAY OF VANCOMYCIN: CPT | Performed by: PATHOLOGY

## 2024-07-22 RX ORDER — GABAPENTIN 300 MG/1
300 CAPSULE ORAL NIGHTLY
Status: DISCONTINUED | OUTPATIENT
Start: 2024-07-22 | End: 2024-07-24

## 2024-07-22 ASSESSMENT — COGNITIVE AND FUNCTIONAL STATUS - GENERAL
MOBILITY SCORE: 24
DAILY ACTIVITIY SCORE: 24

## 2024-07-22 ASSESSMENT — ENCOUNTER SYMPTOMS
NEUROLOGICAL NEGATIVE: 1
CONSTITUTIONAL NEGATIVE: 1
GASTROINTESTINAL NEGATIVE: 1
PSYCHIATRIC NEGATIVE: 1
CARDIOVASCULAR NEGATIVE: 1
WOUND: 1
MUSCULOSKELETAL NEGATIVE: 1
RESPIRATORY NEGATIVE: 1

## 2024-07-22 ASSESSMENT — PAIN SCALES - GENERAL
PAINLEVEL_OUTOF10: 7
PAINLEVEL_OUTOF10: 0 - NO PAIN

## 2024-07-22 ASSESSMENT — PAIN - FUNCTIONAL ASSESSMENT: PAIN_FUNCTIONAL_ASSESSMENT: 0-10

## 2024-07-22 ASSESSMENT — PAIN DESCRIPTION - DESCRIPTORS: DESCRIPTORS: ACHING

## 2024-07-22 NOTE — CARE PLAN
Patient calm and cooperative with care, IV antibiotics administered without issue, Pt had a new IV placed as the previous one dislodged. Pain managed throughout the shift.     Problem: Pain  Goal: Takes deep breaths with improved pain control throughout the shift  Outcome: Progressing  Goal: Turns in bed with improved pain control throughout the shift  Outcome: Progressing  Goal: Walks with improved pain control throughout the shift  Outcome: Progressing  Goal: Performs ADL's with improved pain control throughout shift  Outcome: Progressing   The patient's goals for the shift include Patient willreceived IV antibiotics as ordered this shift    The clinical goals for the shift include patient will rate pain 5 or less by the end of the shift    Over the shift, the patient did tmake progress toward the following goals.

## 2024-07-22 NOTE — NURSING NOTE
Ms. Muller is resting in bed.  She is upset re the fact that she has not yet been seen by podiatry service.  She is anxious to go home once she learns about treatment for foot ulcer.  We reviewed her BG readings.  She reiterates that she will not consider insulin for home.  Discussed plan for home with Januvia.  Reviewed action of Januvia.  She is agreeable to follow up visit mid week. Nursing made aware of visit and they are awaiting podiatry consult for input into plan of care.   Time spent:  15 mins.

## 2024-07-22 NOTE — PROGRESS NOTES
"  MEDICINE INPATIENT PROGRESS NOTE      Ramya Muller is a 69 y.o. female on day 4 of admission presenting with Diabetic ulcer of toe of left foot associated with diabetes mellitus of other type, unspecified ulcer stage (Multi).    Subjective   No acute events overnight. Patient reports her pain is overall improved since her admission. She is eating and drinking and having bowel movements. She denies any other acute or worsening issues at this time.        Objective     Last Recorded Vitals  Blood pressure 132/72, pulse 65, temperature 36.4 °C (97.5 °F), temperature source Temporal, resp. rate 18, height 1.575 m (5' 2\"), weight 88.5 kg (195 lb), SpO2 96%.  Intake/Output last 3 Shifts:  I/O last 3 completed shifts:  In: 570 (6.4 mL/kg) [P.O.:120; IV Piggyback:450]  Out: - (0 mL/kg)   Weight: 88.5 kg       Physical Exam  Constitutional:       General: She is not in acute distress.     Appearance: Normal appearance.   HENT:      Head: Normocephalic and atraumatic.      Mouth/Throat:      Mouth: Mucous membranes are moist.   Eyes:      Extraocular Movements: Extraocular movements intact.   Cardiovascular:      Rate and Rhythm: Normal rate and regular rhythm.      Pulses: Normal pulses.      Heart sounds: Normal heart sounds.   Pulmonary:      Effort: Pulmonary effort is normal.      Breath sounds: Normal breath sounds.   Abdominal:      General: Abdomen is flat.      Palpations: Abdomen is soft.   Musculoskeletal:      Right lower leg: No edema.      Left lower leg: No edema.      Comments: L foot in dressing   Skin:     General: Skin is warm and dry.   Neurological:      Mental Status: She is alert and oriented to person, place, and time.         Relevant Results    Labs    Results from last 7 days   Lab Units 07/22/24  0900 07/21/24  1252 07/20/24  0915   WBC AUTO x10*3/uL 10.8 11.9* 9.3   HEMOGLOBIN g/dL 11.5* 10.9* 10.3*   HEMATOCRIT % 36.1 33.5* 33.5*   PLATELETS AUTO x10*3/uL 280 287 220            Results from " "last 7 days   Lab Units 07/20/24  0915 07/19/24  0804 07/18/24  1151   SODIUM mmol/L 133* 136 138   POTASSIUM mmol/L 3.7 3.7 3.7   CO2 mmol/L 25 25 26   ANION GAP mmol/L 14 14 15   BUN mg/dL 14 20 20   CREATININE mg/dL 0.59 0.59 0.62   GLUCOSE mg/dL 166* 130* 124*   EGFR mL/min/1.73m*2 >90 >90 >90   MAGNESIUM mg/dL 2.20  --  1.98   PHOSPHORUS mg/dL 3.2 3.4  --       Results from last 7 days   Lab Units 07/18/24  1151   ALT U/L 13   AST U/L 21   ALK PHOS U/L 79            No lab exists for component: \"APTTT\"     Results from last 7 days   Lab Units 07/18/24  1151   LACTATE mmol/L 0.9        Medications  Scheduled medications  DULoxetine, 30 mg, oral, Daily  enoxaparin, 40 mg, subcutaneous, q24h  gabapentin, 300 mg, oral, Nightly  insulin lispro, 0-5 Units, subcutaneous, TID  piperacillin-tazobactam, 4.5 g, intravenous, q6h  polyethylene glycol, 17 g, oral, Daily  triamterene-hydrochlorothiazid, 1 tablet, oral, Daily  vancomycin, 750 mg, intravenous, q8h      Continuous medications     PRN medications  PRN medications: dextrose, dextrose, glucagon, glucagon, oxyCODONE, vancomycin     Imaging  Lower extremity venous duplex bilateral    Result Date: 7/19/2024  No sonographic evidence for deep vein thrombosis within the evaluated veins of the bilateral lower extremities.   I personally reviewed the images/study and I agree with the findings as stated by Margarita Ng MD. This study was interpreted at [place], [place].   MACRO: None   Signed by: Giancarlo Castorena 7/19/2024 3:46 PM Dictation workstation:   [id number]    MR foot left wo IV contrast    Result Date: 7/19/2024  1. 1st interphalangeal region soft tissue ulcer with subjacent osteomyelitis of the 1st distal phalanx and distal aspect of 1st proximal phalanx. There is an additional medial fluid collection suspected with internal soft tissue gas which may reflect a tiny superficial abscess. 2. Increased T2 signal " throughout the subcutaneous soft tissues of the visualized lower extremity, consistent with generalized edema and/or cellulitis. 3. 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   I personally reviewed the images/study and I agree with the findings as stated. This study was interpreted at [place], [place].   MACRO: None   Signed by: Turner Salcedo 7/19/2024 9:19 AM Dictation workstation:   [id number]    XR foot left 3+ views    Result Date: 7/18/2024  1. Findings most compatible with osteomyelitis of the 1st toe with overlying wound/ulcer on the medial side of the toe. 2. Findings which may represent osteomyelitis of the head of the proximal phalanx of the 1st digit. 3. Soft tissue gas in the 1st toe for which differential considerations include sequelae of open nature of process gas-forming infection and/or any recent intervention. 4. MRI is recommended for further assessment.     I personally reviewed the images/study and I agree with the findings as stated. This study was interpreted at [place], [place].   MACRO: None   Signed by: Smooth Espinoza 7/18/2024 1:16 PM Dictation workstation:   [id number]    XR foot left 3+ views    Result Date: 7/7/2024  No acute osseous abnormality.  Moderate dorsal soft tissue swelling Signed by Dwight Villa MD          Assessment/Plan     Ramya Muller is a 69 y.o. female with poorly controlled T2DM (A1c 8%, 4/15/2024) initially treated for a diabetic foot soft tissue infection of the L 1st digit now presenting with worsening pain and with nonhealing ulcer that probes to bone with MRI L foot confirming osteomyelitis of L great toe. with involvement of distal metatarsal w/o fluid collection (septic arthritis w/o drainable pocket).        Updates 7/22  -Podiatry to see her today  -C/w IV vanc/zosyn for osteo while awaiting final eval/tx recs from podiatry   -If  no plans for amputation/debridement, then c/s infectious disease for abx management recs  -Started gabapentin 300mg nightly       #L 1st digit diabetic ulcer  #Osteomyelitis  #Septic arthritis w/o drainable pocket  PLAN  -blood cxs 7/19: NGTD  -Vancomycin (7/18-)  -Zosyn 4.5 g q6h (7/18-)  -Pain control: duloxetine 30 mg daily (home med); oxycodone 5 mg q6h prn  -Podiatry to see her today  -Podiatry OP follow up on discharge     #T2DM c/b neuropathy  A1c 8.6%; BG WNL  PLAN  -SSI #1 for now, requiring 0-1 units daily  -hypoglycemia protocol  -Endo and PCP OP follow up on discharge  -Initiate antihyperglycemic agent on discharge: glipizide 5mg daily  -ASCVD risk at least 13.3%; Plan to start statin upon discharge  -Lipid panel ordered;      #Fibromyalgia and diabetic neuropathy  -C/w home Duloxetine 30 mg daily  -Added gabapentin 300mg nightly     #AFIB, resolved  -Chart diagnosis. Patient is unaware of this diagnosis. No record of rate/rhythm control meds. EKG normal, no palpitations      #Lower leg cramps  -Iron profile c/w ACD; consider iron repletion for RLS tx when infection resolves  -DVT US BLE 7/19 negative    #ACD  -Iron studies consistent with anemia of chronic disease (iron low, transferrin WNL; ferritin elevated)  PLAN  -See above for diabetic OM     F: prn  E: prn  N: regular  A: PIV  DVT: lovenox  GI: not indicated     Bowel Regimen: miralax azeem daily  Code Status: Full code (confirmed on admission)  NOK: Jennifer (daughter): [phone]      Jimbo Wynn MD

## 2024-07-22 NOTE — CARE PLAN
The patient's goals for the shift include Patient willreceived IV antibiotics as ordered this shift    The clinical goals for the shift include Pt. will verbalize improved pain by end of shift    Over the shift, the patient did not make progress toward the following goals. Barriers to progression include. Recommendations to address these barriers include.    Problem: Pain  Goal: Takes deep breaths with improved pain control throughout the shift  Outcome: Progressing  Goal: Turns in bed with improved pain control throughout the shift  Outcome: Progressing  Goal: Walks with improved pain control throughout the shift  Outcome: Progressing  Goal: Performs ADL's with improved pain control throughout shift  Outcome: Progressing  Goal: Participates in PT with improved pain control throughout the shift  Outcome: Progressing  Goal: Free from opioid side effects throughout the shift  Outcome: Progressing  Goal: Free from acute confusion related to pain meds throughout the shift  Outcome: Progressing     Problem: Skin  Goal: Decreased wound size/increased tissue granulation at next dressing change  Outcome: Progressing  Goal: Participates in plan/prevention/treatment measures  Outcome: Progressing  Goal: Prevent/manage excess moisture  Outcome: Progressing  Goal: Prevent/minimize sheer/friction injuries  Outcome: Progressing  Goal: Promote/optimize nutrition  Outcome: Progressing  Goal: Promote skin healing  Outcome: Progressing

## 2024-07-22 NOTE — HOSPITAL COURSE
Ramya Muller is a 69 y.o. female with PMH of T2DM (last A1c 10.6 not on any medications), A-fib not on anticoagulation, tubular adenoma of colon, diverticulosis, arthritis, onychomycosis, fibromyalgia, hypertension, GERD on omeprazole, TATO on CPAP, stage 3a CKD, ovarian cancer stage IA s/p DOMINICK/BSO.     The lesion on her left great toe occurred on 7/5 after soaking/scrubbing her feet. She went to ED on 7/6 at  for this. No bone abnormalities were visualized on x-ray and the patient agreed for one dose of IV antibiotics in the ED with discharge on oral clindamycin and pain control with outpatient podiatry consult. The ulceration continued to worsen despite oral clindamycin and she presented to the ED on 7/18 with pain, swelling, ulceration, purulent discharge, and bleeding in the plantar aspect of left great toe.    In the ED, she was started on IV vanc and zosyn. MRI showed osteomyelitis of first proximal and distal phalanx with medial fluid collection likely representing abscess. Podiatry was consulted and evaluated patient. She underwent L hallux amputation on 7/24/24. Wound was left open and she went back on 7/26/24 for second look and closure. Wound cultures from 7/24/24 showed no organisms. Her vanc and zosyn were discontinued and she was given a 48 hour course of augmentin and doxycycline, which she finished during her hospitalization. She is WBAT on her LLE in surgical boot per podiatry. She will follow up with Dr. Herndon in 1 week. Her dressing is to be left in place until her follow up with podiatry.    For her diabetes, repeat A1c was 8.6 so she was started on sliding scale insulin with minimal requirements while inpatient. She was started on glipizide 5mg at discharge. Lipid panel also showed LDL of 137 but she has a statin allergy (chart review says anaphylaxis, patient is unable to verify) so she was not started on a statin. She was also put on gabapentin 300mg TID. She will follow up with  endocrinology.

## 2024-07-22 NOTE — PROGRESS NOTES
Vancomycin Dosing by Pharmacy- FOLLOW UP    Ramya Muller is a 69 y.o. year old female who Pharmacy has been consulted for vancomycin dosing for Diabetic Foot Infection. Based on the patient's indication and renal status this patient is being dosed based on a goal AUC of 400-600.     Renal function is currently stable.    Current vancomycin dose: 750 mg given every 8 hours    Estimated vancomycin AUC on current dose: 391 mg/L.hr     Visit Vitals  /72   Pulse 65   Temp 36.4 °C (97.5 °F) (Temporal)   Resp 18        Lab Results   Component Value Date    CREATININE 0.59 2024    CREATININE 0.59 2024    CREATININE 0.62 2024    CREATININE 0.65 2024        Patient weight is as follows:   Vitals:    24 1020   Weight: 88.5 kg (195 lb)       Cultures:  No results found for the encounter in last 14 days.       I/O last 3 completed shifts:  In: 570 (6.4 mL/kg) [P.O.:120; IV Piggyback:450]  Out: - (0 mL/kg)   Weight: 88.5 kg   I/O during current shift:  No intake/output data recorded.    Temp (24hrs), Av.7 °C (98.1 °F), Min:36.4 °C (97.5 °F), Max:37 °C (98.6 °F)      Assessment/Plan    Below goal AUC. Orders placed for new vancomcyin regimen of 1500 every 12 hours to begin at 2200.     This dosing regimen is predicted by [employer] to result in the following pharmacokinetic parameters:  Loading dose: N/A  Regimen: 1500 mg IV every 12 hours.  Start time: 17:59 on 2024  Exposure target: AUC24 (range)400-600 mg/L.hr   AUC24,ss: 520 mg/L.hr  Probability of AUC24 > 400: 98 %  Ctrough,ss: 14.4 mg/L  Probability of Ctrough,ss > 20: 2 %  Probability of nephrotoxicity (Lodise ALYCE ): 10 %    The next level will be obtained on  at AM labs. May be obtained sooner if clinically indicated.   Will continue to monitor renal function daily while on vancomycin and order serum creatinine at least every 48 hours if not already ordered.  Follow for continued vancomycin needs, clinical response,  and signs/symptoms of toxicity.       Thomas Post, PharmD

## 2024-07-22 NOTE — CONSULTS
Inpatient consult to Podiatry  Consult performed by: Barry Beavers DPM  Consult ordered by: Ann Hooks MD MPH        Reason For Consult  Left hallux ulcer    History Of Present Illness  Ramya Muller is a 69 y.o. female presenting with left hallux ulcer. Podiatry was consulted for the ulcer. Patient was seen at bedside today. She states that the ulcer has been there for approximately 1 month and she saw a podiatrist at [facility] for the ulcer previously. She developed increased pain and swelling in the toe so she came to the ED. Imaging was obtained by primary team prior to consult and x-ray and MRI of left foot demonstrated osteomyelitis in the left first toe. These results were discussed with patient and we recommended amputation of the toe. Patient was agreeable to this. Patient denies current constitutional symptoms and other pedal complaints today.     Past Medical History  She has no past medical history on file.    Surgical History  She has no past surgical history on file.     Social History  She reports that she has never smoked. She has never been exposed to tobacco smoke. She has never used smokeless tobacco. She reports that she does not currently use alcohol. She reports that she does not use drugs.    Family History  No family history on file.     Allergies  Atorvastatin and Pregabalin    Review of Systems   Constitutional: Negative.    Respiratory: Negative.     Cardiovascular: Negative.    Gastrointestinal: Negative.    Musculoskeletal: Negative.    Skin:  Positive for wound.   Neurological: Negative.    Psychiatric/Behavioral: Negative.         Objective     Physical Exam:   General: AAOx3, no acute distress, left foot dressing intact    Vasc: Dorsalis pedis and posterior tibial pulses are palpable bilateral.  Capillary refill time is less than 3 seconds to the hallux bilateral. Skin temperature is warm to cool from proximal tibia to distal digits bilateral. Focal edema and erythema in the  "left hallux.     Neuro: Light touch sensation is intact to the foot bilateral.      Derm: Skin is mildly xerotic. Nails bilateral are dystrophic. Webspaces are clean, dry and intact bilateral. There is a full thickness ulceration on the medial aspect of the left hallux. Ulcer base is fibrotic and does probe to bone. Wound edges are hyperkeratotic. Focal edema and erythema in the digit. No palpable crepitus or fluctuance. Scant serous drainage, no purulence.       Ortho: No pain to palpation. There are no structural deformities noted bilateral.    Last Recorded Vitals  Blood pressure 115/71, pulse 84, temperature 36.6 °C (97.9 °F), resp. rate 18, height 1.575 m (5' 2\"), weight 88.5 kg (195 lb), SpO2 99%.    Relevant Results  Results for orders placed or performed during the hospital encounter of 07/18/24 (from the past 24 hour(s))   POCT GLUCOSE   Result Value Ref Range    POCT Glucose 123 (H) 74 - 99 mg/dL   POCT GLUCOSE   Result Value Ref Range    POCT Glucose 131 (H) 74 - 99 mg/dL   Vancomycin   Result Value Ref Range    Vancomycin 11.8 5.0 - 20.0 ug/mL   CBC and Auto Differential   Result Value Ref Range    WBC 10.8 4.4 - 11.3 x10*3/uL    nRBC 0.0 0.0 - 0.0 /100 WBCs    RBC 4.47 4.00 - 5.20 x10*6/uL    Hemoglobin 11.5 (L) 12.0 - 16.0 g/dL    Hematocrit 36.1 36.0 - 46.0 %    MCV 81 80 - 100 fL    MCH 25.7 (L) 26.0 - 34.0 pg    MCHC 31.9 (L) 32.0 - 36.0 g/dL    RDW 11.7 11.5 - 14.5 %    Platelets 280 150 - 450 x10*3/uL    Neutrophils % 74.5 40.0 - 80.0 %    Immature Granulocytes %, Automated 0.8 0.0 - 0.9 %    Lymphocytes % 13.5 13.0 - 44.0 %    Monocytes % 8.2 2.0 - 10.0 %    Eosinophils % 2.2 0.0 - 6.0 %    Basophils % 0.8 0.0 - 2.0 %    Neutrophils Absolute 8.06 (H) 1.20 - 7.70 x10*3/uL    Immature Granulocytes Absolute, Automated 0.09 0.00 - 0.70 x10*3/uL    Lymphocytes Absolute 1.46 1.20 - 4.80 x10*3/uL    Monocytes Absolute 0.89 0.10 - 1.00 x10*3/uL    Eosinophils Absolute 0.24 0.00 - 0.70 x10*3/uL    " Basophils Absolute 0.09 0.00 - 0.10 x10*3/uL   POCT GLUCOSE   Result Value Ref Range    POCT Glucose 211 (H) 74 - 99 mg/dL   POCT GLUCOSE   Result Value Ref Range    POCT Glucose 136 (H) 74 - 99 mg/dL     ECG 12 Lead    Result Date: 7/22/2024   Poor data quality, interpretation may be adversely affected Normal sinus rhythm Right bundle branch block Abnormal ECG No previous ECGs available Confirmed by Vince Sullivan ([id number]) on 7/22/2024 5:07:39 PM    Lower extremity venous duplex bilateral    Result Date: 7/19/2024  Interpreted By:  Giancarlo Castorena,  and Hernandez Avila STUDY: [facility] LOWER EXTREMITY VENOUS DUPLEX BILATERAL;  7/19/2024 3:10 pm   INDICATION: Signs/Symptoms:Risk for DVT, complaining of bilateral calf pain / cramps and tightness.   COMPARISON: None.   ACCESSION NUMBER(S): [id number]   ORDERING CLINICIAN: AASHISH NEWSOME   TECHNIQUE: Vascular ultrasound of the bilateral lower extremities was performed. Real-time compression views as well as Gray scale, color Doppler and spectral Doppler waveform analysis was performed.   FINDINGS: Evaluation of the visualized portions of the bilateral common femoral, proximal, mid, and distal femoral, and popliteal veins was performed.  Evaluation of the visualized portions of the bilateral posterior tibial and left peroneal veins was also performed.   Limitations: None.   The evaluated veins demonstrate normal compressibility. There is intact venous flow demonstrating normal respiratory variability and normal augmentation of flow with calf compression.       No sonographic evidence for deep vein thrombosis within the evaluated veins of the bilateral lower extremities.   I personally reviewed the images/study and I agree with the findings as stated by Margarita Ng MD. This study was interpreted at [place], [place].   MACRO: None   Signed by: Giancarlo Castorena 7/19/2024 3:46 PM Dictation workstation:    [id number]    MR foot left wo IV contrast    Result Date: 7/19/2024  Interpreted By:  Turner Salcedo and Baker Zachary STUDY: MRI of the left foot and ankle without IV contrast; 7/19/2024 1:17 am   INDICATION: Signs/Symptoms:Diabetic foot with osteomyelitis with edema in whole left foot.   COMPARISON: Left foot radiographs on 07/18/2024.   ACCESSION NUMBER(S): [id number]   ORDERING CLINICIAN: JERROD GONZALEZ   TECHNIQUE: MR imaging of the  left foot and ankle was obtained  without administration of intravenous contrast medium.   FINDINGS: TENDONS: There is Achilles tendinosis. This peroneal tendinosis. There thickening of the plantar fascia. The remainder of the tendons are intact.   LIGAMENTS: The major ligamentous structures of the forefoot are normal. The Lisfranc ligament is normal.   JOINTS/OSSEOUS STRUCTURES: There is T1 hypointense signal with associated T2/STIR hyperintensity involving the entirety of the 1st distal phalanx, consistent with osteomyelitis. Additional areas of irregular T1 hypointensity with associated T2/STIR hyperintensity throughout the mid to distal 1st proximal phalanx, likely representing additional site of osteomyelitis. There is also focus of T2 hyperintense signal with associated T1 hypointense signal within distal 1st metatarsal (series 10, image 16), favored to to be degenerative in nature. There is no evidence of significant joint effusion in the forefoot or midfoot. There is no evidence of acute fracture or dislocation.   SOFT TISSUES: There is skin surface irregularity and ulceration along the medial aspect of the distal 1st digit with significant soft tissue edema about the entirety of the 1st digit. There is a questionable relatively conglomerate focus of T2 hyperintense signal at the plantar medial aspect of the 1st proximal phalangeal head measuring approximately 14 x 11 mm (series 10, image 16). This is very superficial and likely contiguous with the sinus tract. There is  generalized atrophy of the musculature of the foot. There is additionally increased T2 signal intensity throughout the musculature. There is edema throughout the subcutaneous soft tissues of the visualized foot.       1. 1st interphalangeal region soft tissue ulcer with subjacent osteomyelitis of the 1st distal phalanx and distal aspect of 1st proximal phalanx. There is an additional medial fluid collection suspected with internal soft tissue gas which may reflect a tiny superficial abscess. 2. Increased T2 signal throughout the subcutaneous soft tissues of the visualized lower extremity, consistent with generalized edema and/or cellulitis. 3. 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   I personally reviewed the images/study and I agree with the findings as stated. This study was interpreted at [place], [place].   MACRO: None   Signed by: Turner Salcedo 7/19/2024 9:19 AM Dictation workstation:   [id number]    XR foot left 3+ views    Result Date: 7/18/2024  Interpreted By:  Smooth Espinoza and Booth Cameron STUDY: XR FOOT LEFT 3+ VIEWS; ;  7/18/2024 12:20 pm   INDICATION: Signs/Symptoms:L toe infection.   COMPARISON: XR left foot 07/06/2024   ACCESSION NUMBER(S): [id number]   ORDERING CLINICIAN: ALEJANDRO DUTTA   FINDINGS: Decreased radiodensity 1st toe distal phalanx with loss of normal trabecular architecture and erosion of overlying cortex, with destruction of the bone stock. There is also medial displacement of eroded cortical fragments. There is question of some lucency with erosion to the medial side of the head of the proximal phalanx of the 1st digit. Osteophyte present at the plantar aspect of the calcaneus. Enthesophyte noted at posterior aspect of calcaneal tuberosity. Mild degenerative changes of the talocalcaneonavicular joint and forefoot, worst at 1st MTP and DIP joints. There is a wound/ulcer on the distal medial aspect  of the 1st toe. There are mottled foci of lucency projecting over the 1st toe most evident along the medial aspect of the toe at the level of the distal metaphysis of the proximal phalanx       1. Findings most compatible with osteomyelitis of the 1st toe with overlying wound/ulcer on the medial side of the toe. 2. Findings which may represent osteomyelitis of the head of the proximal phalanx of the 1st digit. 3. Soft tissue gas in the 1st toe for which differential considerations include sequelae of open nature of process gas-forming infection and/or any recent intervention. 4. MRI is recommended for further assessment.     I personally reviewed the images/study and I agree with the findings as stated. This study was interpreted at [place], [place].   MACRO: None   Signed by: Smooth Espinoza 7/18/2024 1:16 PM Dictation workstation:   [id number]    XR foot left 3+ views    Result Date: 7/7/2024  STUDY: Foot Radiographs; 07/06/2024 11:40 PM INDICATION: Left foot wound, new.  COMPARISON: None available.  ACCESSION NUMBER(S): [id number] ORDERING CLINICIAN: TIERRA POLLACK TECHNIQUE:  Three view(s) of the left foot. FINDINGS:  There is no displaced fracture.  The alignment is anatomic.  Moderate dorsal soft tissue swelling.  No suspicious osseous erosive lesion identified.    No acute osseous abnormality.  Moderate dorsal soft tissue swelling Signed by Dwight Villa MD         Assessment/Plan   Principal Problem:    Diabetic ulcer of toe of left foot associated with diabetes mellitus of other type, unspecified ulcer stage (Multi)  Active Problems:    Type 2 diabetes mellitus with diabetic peripheral angiopathy without gangrene, without long-term current use of insulin (Multi)    Pyogenic inflammation of bone (Multi)    #Cellulitis, left foot  #Full thickness ulceration, left hallux  #Osteomyelitis, left hallux    -Patient was examined and findings were discussed with  patient  -Chart, labs, and imaging were reviewed and relevant results were discussed  -Labs: WBC 10.8, ESR 84, CRP 14.69, A1c 8.6  -Blood cultures negative  -Imaging: Left foot x-rays and MRI demonstrate osteomyelitis in left hallux  -Re-dressed with betadine WTD dressing  -Plan is for left hallux amputation on Wednesday 7/24/24 with Dr. Herndon  -Please make NPO at midnight Wednesday for surgery  -Please obtain PVRs prior to surgical intervention  -Patient was seen and discussed with attending, Dr. Yanick Beavers DPM PGY-3     I saw and evaluated the patient. I personally obtained the key and critical portions of the history and physical exam or was physically present for key and critical portions performed by the resident/fellow. I reviewed the resident/fellow's documentation and discussed the patient with the resident/fellow. I agree with the resident/fellow's medical decision making as documented in the note.    I spent 60 minutes in the professional and overall care of this patient.    Crow Herndon DPM

## 2024-07-23 ENCOUNTER — ANESTHESIA EVENT (OUTPATIENT)
Dept: OPERATING ROOM | Facility: HOSPITAL | Age: 69
End: 2024-07-23
Payer: MEDICARE

## 2024-07-23 PROBLEM — M79.7 FIBROMYALGIA: Status: ACTIVE | Noted: 2024-07-23

## 2024-07-23 PROBLEM — E66.9 OBESITY: Status: ACTIVE | Noted: 2024-07-23

## 2024-07-23 PROBLEM — E11.40 DIABETIC NEUROPATHY (MULTI): Status: ACTIVE | Noted: 2024-07-23

## 2024-07-23 LAB
ALBUMIN SERPL BCP-MCNC: 3.6 G/DL (ref 3.4–5)
ANION GAP SERPL CALC-SCNC: 14 MMOL/L (ref 10–20)
BACTERIA BLD CULT: NORMAL
BACTERIA BLD CULT: NORMAL
BASOPHILS # BLD AUTO: 0.1 X10*3/UL (ref 0–0.1)
BASOPHILS NFR BLD AUTO: 0.9 %
BUN SERPL-MCNC: 14 MG/DL (ref 6–23)
CALCIUM SERPL-MCNC: 8.9 MG/DL (ref 8.6–10.6)
CHLORIDE SERPL-SCNC: 94 MMOL/L (ref 98–107)
CO2 SERPL-SCNC: 28 MMOL/L (ref 21–32)
CREAT SERPL-MCNC: 0.64 MG/DL (ref 0.5–1.05)
EGFRCR SERPLBLD CKD-EPI 2021: >90 ML/MIN/1.73M*2
EOSINOPHIL # BLD AUTO: 0.4 X10*3/UL (ref 0–0.7)
EOSINOPHIL NFR BLD AUTO: 3.7 %
ERYTHROCYTE [DISTWIDTH] IN BLOOD BY AUTOMATED COUNT: 11.7 % (ref 11.5–14.5)
GLUCOSE BLD MANUAL STRIP-MCNC: 138 MG/DL (ref 74–99)
GLUCOSE BLD MANUAL STRIP-MCNC: 145 MG/DL (ref 74–99)
GLUCOSE BLD MANUAL STRIP-MCNC: 223 MG/DL (ref 74–99)
GLUCOSE SERPL-MCNC: 213 MG/DL (ref 74–99)
HCT VFR BLD AUTO: 35.9 % (ref 36–46)
HGB BLD-MCNC: 11.5 G/DL (ref 12–16)
IMM GRANULOCYTES # BLD AUTO: 0.12 X10*3/UL (ref 0–0.7)
IMM GRANULOCYTES NFR BLD AUTO: 1.1 % (ref 0–0.9)
LYMPHOCYTES # BLD AUTO: 1.76 X10*3/UL (ref 1.2–4.8)
LYMPHOCYTES NFR BLD AUTO: 16.3 %
MAGNESIUM SERPL-MCNC: 2.06 MG/DL (ref 1.6–2.4)
MCH RBC QN AUTO: 25.4 PG (ref 26–34)
MCHC RBC AUTO-ENTMCNC: 32 G/DL (ref 32–36)
MCV RBC AUTO: 79 FL (ref 80–100)
MONOCYTES # BLD AUTO: 0.75 X10*3/UL (ref 0.1–1)
MONOCYTES NFR BLD AUTO: 7 %
NEUTROPHILS # BLD AUTO: 7.64 X10*3/UL (ref 1.2–7.7)
NEUTROPHILS NFR BLD AUTO: 71 %
NRBC BLD-RTO: 0 /100 WBCS (ref 0–0)
PHOSPHATE SERPL-MCNC: 3.7 MG/DL (ref 2.5–4.9)
PLATELET # BLD AUTO: 327 X10*3/UL (ref 150–450)
POTASSIUM SERPL-SCNC: 3.6 MMOL/L (ref 3.5–5.3)
RBC # BLD AUTO: 4.52 X10*6/UL (ref 4–5.2)
SODIUM SERPL-SCNC: 132 MMOL/L (ref 136–145)
VANCOMYCIN SERPL-MCNC: 68.5 UG/ML (ref 5–20)
WBC # BLD AUTO: 10.8 X10*3/UL (ref 4.4–11.3)

## 2024-07-23 PROCEDURE — 36415 COLL VENOUS BLD VENIPUNCTURE: CPT

## 2024-07-23 PROCEDURE — 2500000002 HC RX 250 W HCPCS SELF ADMINISTERED DRUGS (ALT 637 FOR MEDICARE OP, ALT 636 FOR OP/ED)

## 2024-07-23 PROCEDURE — 2500000005 HC RX 250 GENERAL PHARMACY W/O HCPCS

## 2024-07-23 PROCEDURE — 80069 RENAL FUNCTION PANEL: CPT

## 2024-07-23 PROCEDURE — 82947 ASSAY GLUCOSE BLOOD QUANT: CPT

## 2024-07-23 PROCEDURE — 1100000001 HC PRIVATE ROOM DAILY

## 2024-07-23 PROCEDURE — 83735 ASSAY OF MAGNESIUM: CPT

## 2024-07-23 PROCEDURE — 2500000001 HC RX 250 WO HCPCS SELF ADMINISTERED DRUGS (ALT 637 FOR MEDICARE OP)

## 2024-07-23 PROCEDURE — 85025 COMPLETE CBC W/AUTO DIFF WBC: CPT

## 2024-07-23 PROCEDURE — 2500000004 HC RX 250 GENERAL PHARMACY W/ HCPCS (ALT 636 FOR OP/ED)

## 2024-07-23 PROCEDURE — 80202 ASSAY OF VANCOMYCIN: CPT

## 2024-07-23 PROCEDURE — 99232 SBSQ HOSP IP/OBS MODERATE 35: CPT

## 2024-07-23 RX ORDER — ACETAMINOPHEN 325 MG/1
975 TABLET ORAL EVERY 8 HOURS PRN
Status: DISCONTINUED | OUTPATIENT
Start: 2024-07-23 | End: 2024-07-25

## 2024-07-23 RX ORDER — DULAGLUTIDE 0.75 MG/.5ML
0.75 INJECTION, SOLUTION SUBCUTANEOUS
Qty: 2 ML | Refills: 0 | Status: SHIPPED | OUTPATIENT
Start: 2024-07-28 | End: 2024-07-25

## 2024-07-23 ASSESSMENT — COGNITIVE AND FUNCTIONAL STATUS - GENERAL
MOBILITY SCORE: 24
MOBILITY SCORE: 24
DAILY ACTIVITIY SCORE: 24
DAILY ACTIVITIY SCORE: 24

## 2024-07-23 ASSESSMENT — PAIN SCALES - GENERAL
PAINLEVEL_OUTOF10: 0 - NO PAIN
PAINLEVEL_OUTOF10: 0 - NO PAIN

## 2024-07-23 ASSESSMENT — PAIN - FUNCTIONAL ASSESSMENT: PAIN_FUNCTIONAL_ASSESSMENT: 0-10

## 2024-07-23 NOTE — PROGRESS NOTES
"Ramya Muller is a 69 y.o. female on day 5 of admission presenting with Diabetic ulcer of toe of left foot associated with diabetes mellitus of other type, unspecified ulcer stage (Multi).    Subjective   Patient was seen at bedside, resting comfortably. She denies any constitutional symptoms or other pedal complaints today.       Objective     Physical Exam:   General: AAOx3, no acute distress, left foot dressing intact    Dressing was left intact today so examination deferred    Last Recorded Vitals  Blood pressure 115/73, pulse 81, temperature 36.6 °C (97.9 °F), temperature source Temporal, resp. rate 18, height 1.575 m (5' 2\"), weight 88.5 kg (195 lb), SpO2 98%.    Relevant Results      Results for orders placed or performed during the hospital encounter of 07/18/24 (from the past 24 hour(s))   POCT GLUCOSE   Result Value Ref Range    POCT Glucose 136 (H) 74 - 99 mg/dL   POCT GLUCOSE   Result Value Ref Range    POCT Glucose 182 (H) 74 - 99 mg/dL   POCT GLUCOSE   Result Value Ref Range    POCT Glucose 138 (H) 74 - 99 mg/dL   CBC and Auto Differential   Result Value Ref Range    WBC 10.8 4.4 - 11.3 x10*3/uL    nRBC 0.0 0.0 - 0.0 /100 WBCs    RBC 4.52 4.00 - 5.20 x10*6/uL    Hemoglobin 11.5 (L) 12.0 - 16.0 g/dL    Hematocrit 35.9 (L) 36.0 - 46.0 %    MCV 79 (L) 80 - 100 fL    MCH 25.4 (L) 26.0 - 34.0 pg    MCHC 32.0 32.0 - 36.0 g/dL    RDW 11.7 11.5 - 14.5 %    Platelets 327 150 - 450 x10*3/uL    Neutrophils % 71.0 40.0 - 80.0 %    Immature Granulocytes %, Automated 1.1 (H) 0.0 - 0.9 %    Lymphocytes % 16.3 13.0 - 44.0 %    Monocytes % 7.0 2.0 - 10.0 %    Eosinophils % 3.7 0.0 - 6.0 %    Basophils % 0.9 0.0 - 2.0 %    Neutrophils Absolute 7.64 1.20 - 7.70 x10*3/uL    Immature Granulocytes Absolute, Automated 0.12 0.00 - 0.70 x10*3/uL    Lymphocytes Absolute 1.76 1.20 - 4.80 x10*3/uL    Monocytes Absolute 0.75 0.10 - 1.00 x10*3/uL    Eosinophils Absolute 0.40 0.00 - 0.70 x10*3/uL    Basophils Absolute 0.10 0.00 - " 0.10 x10*3/uL   Renal function panel   Result Value Ref Range    Glucose 213 (H) 74 - 99 mg/dL    Sodium 132 (L) 136 - 145 mmol/L    Potassium 3.6 3.5 - 5.3 mmol/L    Chloride 94 (L) 98 - 107 mmol/L    Bicarbonate 28 21 - 32 mmol/L    Anion Gap 14 10 - 20 mmol/L    Urea Nitrogen 14 6 - 23 mg/dL    Creatinine 0.64 0.50 - 1.05 mg/dL    eGFR >90 >60 mL/min/1.73m*2    Calcium 8.9 8.6 - 10.6 mg/dL    Phosphorus 3.7 2.5 - 4.9 mg/dL    Albumin 3.6 3.4 - 5.0 g/dL   Magnesium   Result Value Ref Range    Magnesium 2.06 1.60 - 2.40 mg/dL   Vancomycin   Result Value Ref Range    Vancomycin 68.5 (HH) 5.0 - 20.0 ug/mL   POCT GLUCOSE   Result Value Ref Range    POCT Glucose 223 (H) 74 - 99 mg/dL     ECG 12 Lead    Result Date: 7/22/2024   Poor data quality, interpretation may be adversely affected Normal sinus rhythm Right bundle branch block Abnormal ECG No previous ECGs available Confirmed by Vince Sullivan ([id number]) on 7/22/2024 5:07:39 PM    Lower extremity venous duplex bilateral    Result Date: 7/19/2024  Interpreted By:  Giancarlo Castorena and Calo Sean-Matthew STUDY: [facility] LOWER EXTREMITY VENOUS DUPLEX BILATERAL;  7/19/2024 3:10 pm   INDICATION: Signs/Symptoms:Risk for DVT, complaining of bilateral calf pain / cramps and tightness.   COMPARISON: None.   ACCESSION NUMBER(S): [id number]   ORDERING CLINICIAN: AASHISH NEWSOME   TECHNIQUE: Vascular ultrasound of the bilateral lower extremities was performed. Real-time compression views as well as Gray scale, color Doppler and spectral Doppler waveform analysis was performed.   FINDINGS: Evaluation of the visualized portions of the bilateral common femoral, proximal, mid, and distal femoral, and popliteal veins was performed.  Evaluation of the visualized portions of the bilateral posterior tibial and left peroneal veins was also performed.   Limitations: None.   The evaluated veins demonstrate normal compressibility. There is intact venous flow demonstrating normal  respiratory variability and normal augmentation of flow with calf compression.       No sonographic evidence for deep vein thrombosis within the evaluated veins of the bilateral lower extremities.   I personally reviewed the images/study and I agree with the findings as stated by Margarita Ng MD. This study was interpreted at [facility], [place], [place].   MACRO: None   Signed by: Giancarlo Castorena 7/19/2024 3:46 PM Dictation workstation:   [id number]    MR foot left wo IV contrast    Result Date: 7/19/2024  Interpreted By:  Turner Salcedo and Baker Zachary STUDY: MRI of the left foot and ankle without IV contrast; 7/19/2024 1:17 am   INDICATION: Signs/Symptoms:Diabetic foot with osteomyelitis with edema in whole left foot.   COMPARISON: Left foot radiographs on 07/18/2024.   ACCESSION NUMBER(S): [id number]   ORDERING CLINICIAN: JERROD GONZALEZ   TECHNIQUE: MR imaging of the  left foot and ankle was obtained  without administration of intravenous contrast medium.   FINDINGS: TENDONS: There is Achilles tendinosis. This peroneal tendinosis. There thickening of the plantar fascia. The remainder of the tendons are intact.   LIGAMENTS: The major ligamentous structures of the forefoot are normal. The Lisfranc ligament is normal.   JOINTS/OSSEOUS STRUCTURES: There is T1 hypointense signal with associated T2/STIR hyperintensity involving the entirety of the 1st distal phalanx, consistent with osteomyelitis. Additional areas of irregular T1 hypointensity with associated T2/STIR hyperintensity throughout the mid to distal 1st proximal phalanx, likely representing additional site of osteomyelitis. There is also focus of T2 hyperintense signal with associated T1 hypointense signal within distal 1st metatarsal (series 10, image 16), favored to to be degenerative in nature. There is no evidence of significant joint effusion in the forefoot or midfoot. There is no evidence of acute  fracture or dislocation.   SOFT TISSUES: There is skin surface irregularity and ulceration along the medial aspect of the distal 1st digit with significant soft tissue edema about the entirety of the 1st digit. There is a questionable relatively conglomerate focus of T2 hyperintense signal at the plantar medial aspect of the 1st proximal phalangeal head measuring approximately 14 x 11 mm (series 10, image 16). This is very superficial and likely contiguous with the sinus tract. There is generalized atrophy of the musculature of the foot. There is additionally increased T2 signal intensity throughout the musculature. There is edema throughout the subcutaneous soft tissues of the visualized foot.       1. 1st interphalangeal region soft tissue ulcer with subjacent osteomyelitis of the 1st distal phalanx and distal aspect of 1st proximal phalanx. There is an additional medial fluid collection suspected with internal soft tissue gas which may reflect a tiny superficial abscess. 2. Increased T2 signal throughout the subcutaneous soft tissues of the visualized lower extremity, consistent with generalized edema and/or cellulitis. 3. 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   I personally reviewed the images/study and I agree with the findings as stated. This study was interpreted at [place], [place].   MACRO: None   Signed by: Turner Salcedo 7/19/2024 9:19 AM Dictation workstation:   [id number]    XR foot left 3+ views    Result Date: 7/18/2024  Interpreted By:  Smooth Espinoza and Booth Cameron STUDY: XR FOOT LEFT 3+ VIEWS; ;  7/18/2024 12:20 pm   INDICATION: Signs/Symptoms:L toe infection.   COMPARISON: XR left foot 07/06/2024   ACCESSION NUMBER(S): [id number]   ORDERING CLINICIAN: ALEJANDRO DUTTA   FINDINGS: Decreased radiodensity 1st toe distal phalanx with loss of normal trabecular architecture and erosion of overlying cortex, with  destruction of the bone stock. There is also medial displacement of eroded cortical fragments. There is question of some lucency with erosion to the medial side of the head of the proximal phalanx of the 1st digit. Osteophyte present at the plantar aspect of the calcaneus. Enthesophyte noted at posterior aspect of calcaneal tuberosity. Mild degenerative changes of the talocalcaneonavicular joint and forefoot, worst at 1st MTP and DIP joints. There is a wound/ulcer on the distal medial aspect of the 1st toe. There are mottled foci of lucency projecting over the 1st toe most evident along the medial aspect of the toe at the level of the distal metaphysis of the proximal phalanx       1. Findings most compatible with osteomyelitis of the 1st toe with overlying wound/ulcer on the medial side of the toe. 2. Findings which may represent osteomyelitis of the head of the proximal phalanx of the 1st digit. 3. Soft tissue gas in the 1st toe for which differential considerations include sequelae of open nature of process gas-forming infection and/or any recent intervention. 4. MRI is recommended for further assessment.     I personally reviewed the images/study and I agree with the findings as stated. This study was interpreted at [place], [place].   MACRO: None   Signed by: Smooth Espinoza 7/18/2024 1:16 PM Dictation workstation:   [id number]    XR foot left 3+ views    Result Date: 7/7/2024  STUDY: Foot Radiographs; 07/06/2024 11:40 PM INDICATION: Left foot wound, new.  COMPARISON: None available.  ACCESSION NUMBER(S): [id number] ORDERING CLINICIAN: TIERRA POLLACK TECHNIQUE:  Three view(s) of the left foot. FINDINGS:  There is no displaced fracture.  The alignment is anatomic.  Moderate dorsal soft tissue swelling.  No suspicious osseous erosive lesion identified.    No acute osseous abnormality.  Moderate dorsal soft tissue swelling Signed by Dwight Villa MD          Assessment/Plan   Principal Problem:    Diabetic ulcer of toe of left foot associated with diabetes mellitus of other type, unspecified ulcer stage (Multi)  Active Problems:    Type 2 diabetes mellitus with diabetic peripheral angiopathy without gangrene, without long-term current use of insulin (Multi)    Pyogenic inflammation of bone (Multi)      #Cellulitis, left foot  #Full thickness ulceration, left hallux  #Osteomyelitis, left hallux     -Patient was examined and findings were discussed with patient  -Chart, labs, and imaging were reviewed and relevant results were discussed  -Labs: WBC 10.8, ESR 84, CRP 14.69, A1c 8.6  -Blood cultures negative  -Imaging: Left foot x-rays and MRI demonstrate osteomyelitis in left hallux  -Plan is for left hallux amputation on Wednesday 7/24/24 (tomorrow) with Dr. Herndon  -Please make NPO at midnight for surgery  -Please obtain PVRs prior to surgical intervention  -Patient was discussed with attending, Dr. Yanick Beavers DPM PGY-3       I reviewed the resident/fellow's documentation and discussed the patient with the resident/fellow. I agree with the resident/fellow's medical decision making as documented in the note.     Crow Herndon DPM

## 2024-07-23 NOTE — ANESTHESIA PREPROCEDURE EVALUATION
Patient: Ramya Muller    Procedure Information       Date/Time: 07/24/24 0815    Procedure: Amputation Digit Foot (Left) - Need Misonix    Location: [facility] OR 11 / Virtual [facility]    Surgeons: Crow Herndon DPM            Relevant Problems   Cardiac   (+) Type 2 diabetes mellitus with diabetic peripheral angiopathy without gangrene, without long-term current use of insulin (Multi)      Endocrine   (+) Diabetic neuropathy (Multi)   (+) Obesity   (+) Type 2 diabetes mellitus with diabetic peripheral angiopathy without gangrene, without long-term current use of insulin (Multi)      Musculoskeletal   (+) Fibromyalgia      ID   (+) Pyogenic inflammation of bone (Multi)       Clinical information reviewed:   Tobacco  Allergies  Meds   Med Hx  Surg Hx   Fam Hx  Soc Hx      Results for orders placed or performed during the hospital encounter of 07/18/24 (from the past 24 hour(s))   POCT GLUCOSE   Result Value Ref Range    POCT Glucose 136 (H) 74 - 99 mg/dL   POCT GLUCOSE   Result Value Ref Range    POCT Glucose 182 (H) 74 - 99 mg/dL   POCT GLUCOSE   Result Value Ref Range    POCT Glucose 138 (H) 74 - 99 mg/dL   CBC and Auto Differential   Result Value Ref Range    WBC 10.8 4.4 - 11.3 x10*3/uL    nRBC 0.0 0.0 - 0.0 /100 WBCs    RBC 4.52 4.00 - 5.20 x10*6/uL    Hemoglobin 11.5 (L) 12.0 - 16.0 g/dL    Hematocrit 35.9 (L) 36.0 - 46.0 %    MCV 79 (L) 80 - 100 fL    MCH 25.4 (L) 26.0 - 34.0 pg    MCHC 32.0 32.0 - 36.0 g/dL    RDW 11.7 11.5 - 14.5 %    Platelets 327 150 - 450 x10*3/uL    Neutrophils % 71.0 40.0 - 80.0 %    Immature Granulocytes %, Automated 1.1 (H) 0.0 - 0.9 %    Lymphocytes % 16.3 13.0 - 44.0 %    Monocytes % 7.0 2.0 - 10.0 %    Eosinophils % 3.7 0.0 - 6.0 %    Basophils % 0.9 0.0 - 2.0 %    Neutrophils Absolute 7.64 1.20 - 7.70 x10*3/uL    Immature Granulocytes Absolute, Automated 0.12 0.00 - 0.70 x10*3/uL    Lymphocytes Absolute 1.76 1.20 - 4.80 x10*3/uL    Monocytes Absolute 0.75 0.10 - 1.00  x10*3/uL    Eosinophils Absolute 0.40 0.00 - 0.70 x10*3/uL    Basophils Absolute 0.10 0.00 - 0.10 x10*3/uL   Renal function panel   Result Value Ref Range    Glucose 213 (H) 74 - 99 mg/dL    Sodium 132 (L) 136 - 145 mmol/L    Potassium 3.6 3.5 - 5.3 mmol/L    Chloride 94 (L) 98 - 107 mmol/L    Bicarbonate 28 21 - 32 mmol/L    Anion Gap 14 10 - 20 mmol/L    Urea Nitrogen 14 6 - 23 mg/dL    Creatinine 0.64 0.50 - 1.05 mg/dL    eGFR >90 >60 mL/min/1.73m*2    Calcium 8.9 8.6 - 10.6 mg/dL    Phosphorus 3.7 2.5 - 4.9 mg/dL    Albumin 3.6 3.4 - 5.0 g/dL   Magnesium   Result Value Ref Range    Magnesium 2.06 1.60 - 2.40 mg/dL   Vancomycin   Result Value Ref Range    Vancomycin 68.5 (HH) 5.0 - 20.0 ug/mL   POCT GLUCOSE   Result Value Ref Range    POCT Glucose 223 (H) 74 - 99 mg/dL     Scheduled medications  DULoxetine, 30 mg, oral, Daily  enoxaparin, 40 mg, subcutaneous, q24h  gabapentin, 300 mg, oral, Nightly  insulin lispro, 0-5 Units, subcutaneous, TID  piperacillin-tazobactam, 4.5 g, intravenous, q6h  polyethylene glycol, 17 g, oral, Daily  triamterene-hydrochlorothiazid, 1 tablet, oral, Daily  vancomycin, 1,250 mg, intravenous, q12h      Continuous medications     PRN medications  PRN medications: acetaminophen, dextrose, dextrose, glucagon, glucagon, vancomycin       NPO Detail:  No data recorded     Physical Exam    Airway  Mallampati: IV  TM distance: >3 FB  Neck ROM: full     Cardiovascular   Rhythm: regular     Dental    Pulmonary    Abdominal            Anesthesia Plan    History of general anesthesia?: yes  History of complications of general anesthesia?: no    ASA 3     MAC     The patient is not a current smoker.    Anesthetic plan and risks discussed with patient.    Plan discussed with CAA.

## 2024-07-23 NOTE — NURSING NOTE
Geriatric nursing rounds summary  Ms Muller is a 69 year old admitted with DM foot ulcers  Age friendly  What matters full code or for amputation later this week, patient seems accepting  Mentation cam neg  Meds insulin tylenal (now off oxycodone) cont monitor pain  Mobility has been up independenty

## 2024-07-23 NOTE — CARE PLAN
The patient's goals for the shift include Patient willreceived IV antibiotics as ordered this shift    The clinical goals for the shift include Pt. will remain free from injury/falls throughout shift    Over the shift, the patient did not make progress toward the following goals. Barriers to progression include. Recommendations to address these barriers include.    Problem: Pain  Goal: Takes deep breaths with improved pain control throughout the shift  Outcome: Progressing  Goal: Turns in bed with improved pain control throughout the shift  Outcome: Progressing  Goal: Walks with improved pain control throughout the shift  Outcome: Progressing  Goal: Performs ADL's with improved pain control throughout shift  Outcome: Progressing  Goal: Participates in PT with improved pain control throughout the shift  Outcome: Progressing  Goal: Free from opioid side effects throughout the shift  Outcome: Progressing  Goal: Free from acute confusion related to pain meds throughout the shift  Outcome: Progressing     Problem: Skin  Goal: Decreased wound size/increased tissue granulation at next dressing change  Outcome: Progressing  Goal: Participates in plan/prevention/treatment measures  Outcome: Progressing  Goal: Prevent/manage excess moisture  Outcome: Progressing  Goal: Prevent/minimize sheer/friction injuries  Outcome: Progressing  Goal: Promote/optimize nutrition  Outcome: Progressing  Goal: Promote skin healing  Outcome: Progressing

## 2024-07-23 NOTE — PROGRESS NOTES
"  MEDICINE INPATIENT PROGRESS NOTE      Ramya Muller is a 69 y.o. female on day 5 of admission presenting with Diabetic ulcer of toe of left foot associated with diabetes mellitus of other type, unspecified ulcer stage (Multi).    Subjective   No acute events overnight. Patient reports her oxycodone dose made her feel unpleasant. She denies any other acute or worsening issues at this time.        Objective     Last Recorded Vitals  Blood pressure 115/73, pulse 81, temperature 36.6 °C (97.9 °F), temperature source Temporal, resp. rate 18, height 1.575 m (5' 2\"), weight 88.5 kg (195 lb), SpO2 98%.  Intake/Output last 3 Shifts:  I/O last 3 completed shifts:  In: 980 (11.1 mL/kg) [P.O.:480; IV Piggyback:500]  Out: - (0 mL/kg)   Weight: 88.5 kg       Physical Exam  Constitutional:       General: She is not in acute distress.     Appearance: Normal appearance.   HENT:      Head: Normocephalic and atraumatic.      Mouth/Throat:      Mouth: Mucous membranes are moist.   Eyes:      Extraocular Movements: Extraocular movements intact.   Cardiovascular:      Rate and Rhythm: Normal rate and regular rhythm.      Pulses: Normal pulses.      Heart sounds: Normal heart sounds.   Pulmonary:      Effort: Pulmonary effort is normal.      Breath sounds: Normal breath sounds.   Abdominal:      General: Abdomen is flat.      Palpations: Abdomen is soft.   Musculoskeletal:      Right lower leg: No edema.      Left lower leg: No edema.      Comments: L foot in dressing   Skin:     General: Skin is warm and dry.   Neurological:      Mental Status: She is alert and oriented to person, place, and time.         Relevant Results    Labs    Results from last 7 days   Lab Units 07/23/24  0949 07/22/24  0900 07/21/24  1252   WBC AUTO x10*3/uL 10.8 10.8 11.9*   HEMOGLOBIN g/dL 11.5* 11.5* 10.9*   HEMATOCRIT % 35.9* 36.1 33.5*   PLATELETS AUTO x10*3/uL 327 280 287            Results from last 7 days   Lab Units 07/23/24  0949 07/20/24  0915 " "07/19/24  0804 07/18/24  1151   SODIUM mmol/L 132* 133* 136 138   POTASSIUM mmol/L 3.6 3.7 3.7 3.7   CO2 mmol/L 28 25 25 26   ANION GAP mmol/L 14 14 14 15   BUN mg/dL 14 14 20 20   CREATININE mg/dL 0.64 0.59 0.59 0.62   GLUCOSE mg/dL 213* 166* 130* 124*   EGFR mL/min/1.73m*2 >90 >90 >90 >90   MAGNESIUM mg/dL 2.06 2.20  --  1.98   PHOSPHORUS mg/dL 3.7 3.2 3.4  --       Results from last 7 days   Lab Units 07/18/24  1151   ALT U/L 13   AST U/L 21   ALK PHOS U/L 79            No lab exists for component: \"APTTT\"     Results from last 7 days   Lab Units 07/18/24  1151   LACTATE mmol/L 0.9        Medications  Scheduled medications  DULoxetine, 30 mg, oral, Daily  enoxaparin, 40 mg, subcutaneous, q24h  gabapentin, 300 mg, oral, Nightly  insulin lispro, 0-5 Units, subcutaneous, TID  piperacillin-tazobactam, 4.5 g, intravenous, q6h  polyethylene glycol, 17 g, oral, Daily  triamterene-hydrochlorothiazid, 1 tablet, oral, Daily  vancomycin, 1,250 mg, intravenous, q12h      Continuous medications     PRN medications  PRN medications: acetaminophen, dextrose, dextrose, glucagon, glucagon, vancomycin     Imaging  Lower extremity venous duplex bilateral    Result Date: 7/19/2024  No sonographic evidence for deep vein thrombosis within the evaluated veins of the bilateral lower extremities.   I personally reviewed the images/study and I agree with the findings as stated by Margarita Ng MD. This study was interpreted at [place], [place].   MACRO: None   Signed by: Giancarlo Castorena 7/19/2024 3:46 PM Dictation workstation:   [id number]    MR foot left wo IV contrast    Result Date: 7/19/2024  1. 1st interphalangeal region soft tissue ulcer with subjacent osteomyelitis of the 1st distal phalanx and distal aspect of 1st proximal phalanx. There is an additional medial fluid collection suspected with internal soft tissue gas which may reflect a tiny superficial abscess. 2. Increased T2 " signal throughout the subcutaneous soft tissues of the visualized lower extremity, consistent with generalized edema and/or cellulitis. 3. 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   I personally reviewed the images/study and I agree with the findings as stated. This study was interpreted at [place], [place].   MACRO: None   Signed by: Turner Salcedo 7/19/2024 9:19 AM Dictation workstation:   [id number]    XR foot left 3+ views    Result Date: 7/18/2024  1. Findings most compatible with osteomyelitis of the 1st toe with overlying wound/ulcer on the medial side of the toe. 2. Findings which may represent osteomyelitis of the head of the proximal phalanx of the 1st digit. 3. Soft tissue gas in the 1st toe for which differential considerations include sequelae of open nature of process gas-forming infection and/or any recent intervention. 4. MRI is recommended for further assessment.     I personally reviewed the images/study and I agree with the findings as stated. This study was interpreted at [place], [place].   MACRO: None   Signed by: Smooth Espinoza 7/18/2024 1:16 PM Dictation workstation:   [id number]    XR foot left 3+ views    Result Date: 7/7/2024  No acute osseous abnormality.  Moderate dorsal soft tissue swelling Signed by Dwight Villa MD          Assessment/Plan     Ramya Renzo is a 69 y.o. female with poorly controlled T2DM (A1c 8%, 4/15/2024) initially treated for a diabetic foot soft tissue infection of the L 1st digit now presenting with worsening pain and with nonhealing ulcer that probes to bone with MRI L foot confirming osteomyelitis of L great toe with involvement of distal metatarsal w/o fluid collection (septic arthritis w/o drainable pocket).        Updates 7/23  -NPO at midnight for L hallux amputation tomorrow with podiatry  -C/w IV vanc/zosyn   -Dc'd oxycodone,  added tylenol for pain  -PVR pending    #L 1st digit diabetic ulcer  #Osteomyelitis  #Septic arthritis w/o drainable pocket  PLAN  -blood cxs 7/19: NGTD  -Vancomycin (7/18-)  -Zosyn 4.5 g q6h (7/18-)  -Pain control: duloxetine 30 mg daily (home med); tylenol 975mg q8h prn  -L hallux amputation tomorrow with podiatry  -PVR pending     #T2DM c/b neuropathy  A1c 8.6%; BG WNL  PLAN  -SSI #1 for now, requiring 2 units daily  -hypoglycemia protocol  -Endo and PCP OP follow up on discharge  -Initiate antihyperglycemic agent on discharge: glipizide 5mg daily, patient not interested in GLP1 at this time  -ASCVD risk at least 13.3%; Plan to start statin upon discharge  -Lipid panel ordered;      #Fibromyalgia and diabetic neuropathy  -C/w home Duloxetine 30 mg daily  -Added gabapentin 300mg nightly     #AFIB, resolved  -Chart diagnosis. Patient is unaware of this diagnosis. No record of rate/rhythm control meds. EKG normal, no palpitations      #Lower leg cramps  -Iron profile c/w ACD; consider iron repletion for RLS tx when infection resolves  -DVT US BLE 7/19 negative    #ACD  -Iron studies consistent with anemia of chronic disease (iron low, transferrin WNL; ferritin elevated)  PLAN  -See above for diabetic OM     F: prn  E: prn  N: NPO at midnight for L hallux amputation tomorrow  A: PIV  DVT: lovenox  GI: not indicated     Bowel Regimen: miralax azeem daily  Code Status: Full code (confirmed on admission)  NOK: Jennifer (daughter): [phone]      Jimbo Wynn MD

## 2024-07-23 NOTE — PROGRESS NOTES
Vancomycin Dosing by Pharmacy- FOLLOW UP    Ramya Muller is a 69 y.o. year old female who Pharmacy has been consulted for vancomycin dosing for other diabetic foot infection . Based on the patient's indication and renal status this patient is being dosed based on a goal AUC of 500-600.     Renal function is currently stable.    Current vancomycin dose: 1500 mg given every 12 hours    Estimated vancomycin AUC on current dose: 712 mg/L.hr     Visit Vitals  /58 (BP Location: Right arm, Patient Position: Lying)   Pulse 77   Temp 36.6 °C (97.9 °F) (Temporal)   Resp 16        Lab Results   Component Value Date    CREATININE 0.64 2024    CREATININE 0.59 2024    CREATININE 0.59 2024    CREATININE 0.62 2024        Patient weight is as follows:   Vitals:    24 1020   Weight: 88.5 kg (195 lb)       Cultures:  No results found for the encounter in last 14 days.       I/O last 3 completed shifts:  In: 980 (11.1 mL/kg) [P.O.:480; IV Piggyback:500]  Out: - (0 mL/kg)   Weight: 88.5 kg   I/O during current shift:  No intake/output data recorded.    Temp (24hrs), Av.7 °C (98 °F), Min:36.6 °C (97.9 °F), Max:36.8 °C (98.2 °F)      Assessment/Plan    Above goal AUC. Orders placed for new vancomcyin regimen of 1250mg every 12 hours to begin at 2100 on .    This dosing regimen is predicted by [employer] to result in the following pharmacokinetic parameters:  <<<<<PASTE [employer] DATA HERE>>>>>    The next level will be obtained on  at 0500. May be obtained sooner if clinically indicated.   Will continue to monitor renal function daily while on vancomycin and order serum creatinine at least every 48 hours if not already ordered.  Follow for continued vancomycin needs, clinical response, and signs/symptoms of toxicity.       Sarahy Durham, PharmD

## 2024-07-23 NOTE — CARE PLAN
The patient's goals for the shift include Patient willreceived IV antibiotics as ordered this shift    The clinical goals for the shift include Pt will be frree from falls and/or injury for this shift      Problem: Skin  Goal: Decreased wound size/increased tissue granulation at next dressing change  Outcome: Progressing  Goal: Participates in plan/prevention/treatment measures  Outcome: Progressing  Goal: Prevent/manage excess moisture  Outcome: Progressing  Goal: Prevent/minimize sheer/friction injuries  Outcome: Progressing  Goal: Promote/optimize nutrition  Outcome: Progressing  Goal: Promote skin healing  Outcome: Progressing

## 2024-07-24 ENCOUNTER — ANESTHESIA (OUTPATIENT)
Dept: OPERATING ROOM | Facility: HOSPITAL | Age: 69
End: 2024-07-24
Payer: MEDICARE

## 2024-07-24 LAB
ABO GROUP (TYPE) IN BLOOD: NORMAL
ANTIBODY SCREEN: NORMAL
BASOPHILS # BLD AUTO: 0.07 X10*3/UL (ref 0–0.1)
BASOPHILS NFR BLD AUTO: 0.7 %
EOSINOPHIL # BLD AUTO: 0.29 X10*3/UL (ref 0–0.7)
EOSINOPHIL NFR BLD AUTO: 2.9 %
ERYTHROCYTE [DISTWIDTH] IN BLOOD BY AUTOMATED COUNT: 11.6 % (ref 11.5–14.5)
GLUCOSE BLD MANUAL STRIP-MCNC: 120 MG/DL (ref 74–99)
GLUCOSE BLD MANUAL STRIP-MCNC: 130 MG/DL (ref 74–99)
GLUCOSE BLD MANUAL STRIP-MCNC: 143 MG/DL (ref 74–99)
GLUCOSE BLD MANUAL STRIP-MCNC: 169 MG/DL (ref 74–99)
GLUCOSE BLD MANUAL STRIP-MCNC: 198 MG/DL (ref 74–99)
HCT VFR BLD AUTO: 35.5 % (ref 36–46)
HGB BLD-MCNC: 11.4 G/DL (ref 12–16)
IMM GRANULOCYTES # BLD AUTO: 0.11 X10*3/UL (ref 0–0.7)
IMM GRANULOCYTES NFR BLD AUTO: 1.1 % (ref 0–0.9)
INR PPP: 1.1 (ref 0.9–1.1)
LYMPHOCYTES # BLD AUTO: 1.87 X10*3/UL (ref 1.2–4.8)
LYMPHOCYTES NFR BLD AUTO: 18.9 %
MCH RBC QN AUTO: 25.6 PG (ref 26–34)
MCHC RBC AUTO-ENTMCNC: 32.1 G/DL (ref 32–36)
MCV RBC AUTO: 80 FL (ref 80–100)
MONOCYTES # BLD AUTO: 0.69 X10*3/UL (ref 0.1–1)
MONOCYTES NFR BLD AUTO: 7 %
NEUTROPHILS # BLD AUTO: 6.86 X10*3/UL (ref 1.2–7.7)
NEUTROPHILS NFR BLD AUTO: 69.4 %
NRBC BLD-RTO: 0 /100 WBCS (ref 0–0)
PLATELET # BLD AUTO: 339 X10*3/UL (ref 150–450)
POC FINGERSTICK BLOOD GLUCOSE: 130 MG/DL (ref 70–100)
PROTHROMBIN TIME: 12.2 SECONDS (ref 9.8–12.8)
RBC # BLD AUTO: 4.46 X10*6/UL (ref 4–5.2)
RH FACTOR (ANTIGEN D): NORMAL
WBC # BLD AUTO: 9.9 X10*3/UL (ref 4.4–11.3)

## 2024-07-24 PROCEDURE — 2500000004 HC RX 250 GENERAL PHARMACY W/ HCPCS (ALT 636 FOR OP/ED): Performed by: ANESTHESIOLOGIST ASSISTANT

## 2024-07-24 PROCEDURE — 86901 BLOOD TYPING SEROLOGIC RH(D): CPT

## 2024-07-24 PROCEDURE — 87102 FUNGUS ISOLATION CULTURE: CPT | Performed by: PODIATRIST

## 2024-07-24 PROCEDURE — 2500000004 HC RX 250 GENERAL PHARMACY W/ HCPCS (ALT 636 FOR OP/ED)

## 2024-07-24 PROCEDURE — 1100000001 HC PRIVATE ROOM DAILY

## 2024-07-24 PROCEDURE — 85025 COMPLETE CBC W/AUTO DIFF WBC: CPT

## 2024-07-24 PROCEDURE — 2500000004 HC RX 250 GENERAL PHARMACY W/ HCPCS (ALT 636 FOR OP/ED): Performed by: PODIATRIST

## 2024-07-24 PROCEDURE — 2500000001 HC RX 250 WO HCPCS SELF ADMINISTERED DRUGS (ALT 637 FOR MEDICARE OP): Performed by: PODIATRIST

## 2024-07-24 PROCEDURE — 99232 SBSQ HOSP IP/OBS MODERATE 35: CPT

## 2024-07-24 PROCEDURE — 82962 GLUCOSE BLOOD TEST: CPT | Performed by: PODIATRIST

## 2024-07-24 PROCEDURE — 7100000002 HC RECOVERY ROOM TIME - EACH INCREMENTAL 1 MINUTE: Performed by: PODIATRIST

## 2024-07-24 PROCEDURE — 0Y6Q0Z0 DETACHMENT AT LEFT 1ST TOE, COMPLETE, OPEN APPROACH: ICD-10-PCS

## 2024-07-24 PROCEDURE — 82947 ASSAY GLUCOSE BLOOD QUANT: CPT

## 2024-07-24 PROCEDURE — 3700000002 HC GENERAL ANESTHESIA TIME - EACH INCREMENTAL 1 MINUTE: Performed by: PODIATRIST

## 2024-07-24 PROCEDURE — 3600000008 HC OR TIME - EACH INCREMENTAL 1 MINUTE - PROCEDURE LEVEL THREE: Performed by: PODIATRIST

## 2024-07-24 PROCEDURE — 2500000001 HC RX 250 WO HCPCS SELF ADMINISTERED DRUGS (ALT 637 FOR MEDICARE OP)

## 2024-07-24 PROCEDURE — 2500000001 HC RX 250 WO HCPCS SELF ADMINISTERED DRUGS (ALT 637 FOR MEDICARE OP): Performed by: ANESTHESIOLOGY

## 2024-07-24 PROCEDURE — 85610 PROTHROMBIN TIME: CPT

## 2024-07-24 PROCEDURE — 2500000005 HC RX 250 GENERAL PHARMACY W/O HCPCS: Performed by: PODIATRIST

## 2024-07-24 PROCEDURE — 2500000002 HC RX 250 W HCPCS SELF ADMINISTERED DRUGS (ALT 637 FOR MEDICARE OP, ALT 636 FOR OP/ED)

## 2024-07-24 PROCEDURE — 2500000004 HC RX 250 GENERAL PHARMACY W/ HCPCS (ALT 636 FOR OP/ED): Performed by: ANESTHESIOLOGY

## 2024-07-24 PROCEDURE — 3600000003 HC OR TIME - INITIAL BASE CHARGE - PROCEDURE LEVEL THREE: Performed by: PODIATRIST

## 2024-07-24 PROCEDURE — 3700000001 HC GENERAL ANESTHESIA TIME - INITIAL BASE CHARGE: Performed by: PODIATRIST

## 2024-07-24 PROCEDURE — 2720000007 HC OR 272 NO HCPCS: Performed by: PODIATRIST

## 2024-07-24 PROCEDURE — 7100000001 HC RECOVERY ROOM TIME - INITIAL BASE CHARGE: Performed by: PODIATRIST

## 2024-07-24 PROCEDURE — 87070 CULTURE OTHR SPECIMN AEROBIC: CPT | Performed by: PODIATRIST

## 2024-07-24 PROCEDURE — 36415 COLL VENOUS BLD VENIPUNCTURE: CPT

## 2024-07-24 RX ORDER — TRAMADOL HYDROCHLORIDE 50 MG/1
50 TABLET ORAL EVERY 6 HOURS PRN
Status: DISCONTINUED | OUTPATIENT
Start: 2024-07-24 | End: 2024-07-25

## 2024-07-24 RX ORDER — ATORVASTATIN CALCIUM 40 MG/1
40 TABLET, FILM COATED ORAL DAILY
Qty: 30 TABLET | Refills: 0 | Status: CANCELLED | OUTPATIENT
Start: 2024-07-24 | End: 2024-08-23

## 2024-07-24 RX ORDER — GABAPENTIN 300 MG/1
300 CAPSULE ORAL NIGHTLY
Status: CANCELLED | OUTPATIENT
Start: 2024-07-24

## 2024-07-24 RX ORDER — OXYCODONE HYDROCHLORIDE 5 MG/1
10 TABLET ORAL EVERY 4 HOURS PRN
Status: DISCONTINUED | OUTPATIENT
Start: 2024-07-24 | End: 2024-07-24 | Stop reason: HOSPADM

## 2024-07-24 RX ORDER — OXYCODONE HYDROCHLORIDE 5 MG/1
5 TABLET ORAL EVERY 4 HOURS PRN
Status: DISCONTINUED | OUTPATIENT
Start: 2024-07-24 | End: 2024-07-24 | Stop reason: HOSPADM

## 2024-07-24 RX ORDER — HYDROMORPHONE HYDROCHLORIDE 1 MG/ML
0.6 INJECTION, SOLUTION INTRAMUSCULAR; INTRAVENOUS; SUBCUTANEOUS ONCE
Status: COMPLETED | OUTPATIENT
Start: 2024-07-24 | End: 2024-07-24

## 2024-07-24 RX ORDER — MIDAZOLAM HYDROCHLORIDE 1 MG/ML
INJECTION INTRAMUSCULAR; INTRAVENOUS AS NEEDED
Status: DISCONTINUED | OUTPATIENT
Start: 2024-07-24 | End: 2024-07-24

## 2024-07-24 RX ORDER — PROPOFOL 10 MG/ML
INJECTION, EMULSION INTRAVENOUS AS NEEDED
Status: DISCONTINUED | OUTPATIENT
Start: 2024-07-24 | End: 2024-07-24

## 2024-07-24 RX ORDER — OXYCODONE HYDROCHLORIDE 5 MG/1
5 TABLET ORAL EVERY 4 HOURS PRN
Status: DISCONTINUED | OUTPATIENT
Start: 2024-07-24 | End: 2024-07-24

## 2024-07-24 RX ORDER — GABAPENTIN 300 MG/1
300 CAPSULE ORAL 3 TIMES DAILY
Status: DISCONTINUED | OUTPATIENT
Start: 2024-07-24 | End: 2024-07-30 | Stop reason: HOSPADM

## 2024-07-24 RX ORDER — LIDOCAINE HYDROCHLORIDE 10 MG/ML
0.1 INJECTION INFILTRATION; PERINEURAL ONCE
Status: DISCONTINUED | OUTPATIENT
Start: 2024-07-24 | End: 2024-07-24 | Stop reason: HOSPADM

## 2024-07-24 RX ORDER — HYDROMORPHONE HYDROCHLORIDE 1 MG/ML
0.2 INJECTION, SOLUTION INTRAMUSCULAR; INTRAVENOUS; SUBCUTANEOUS EVERY 5 MIN PRN
Status: DISCONTINUED | OUTPATIENT
Start: 2024-07-24 | End: 2024-07-24 | Stop reason: HOSPADM

## 2024-07-24 RX ORDER — ONDANSETRON HYDROCHLORIDE 2 MG/ML
4 INJECTION, SOLUTION INTRAVENOUS ONCE AS NEEDED
Status: DISCONTINUED | OUTPATIENT
Start: 2024-07-24 | End: 2024-07-24 | Stop reason: HOSPADM

## 2024-07-24 RX ORDER — HYDROMORPHONE HYDROCHLORIDE 1 MG/ML
0.4 INJECTION, SOLUTION INTRAMUSCULAR; INTRAVENOUS; SUBCUTANEOUS EVERY 4 HOURS PRN
Status: DISCONTINUED | OUTPATIENT
Start: 2024-07-24 | End: 2024-07-30

## 2024-07-24 RX ORDER — SODIUM CHLORIDE, SODIUM LACTATE, POTASSIUM CHLORIDE, CALCIUM CHLORIDE 600; 310; 30; 20 MG/100ML; MG/100ML; MG/100ML; MG/100ML
100 INJECTION, SOLUTION INTRAVENOUS CONTINUOUS
Status: DISCONTINUED | OUTPATIENT
Start: 2024-07-24 | End: 2024-07-24 | Stop reason: HOSPADM

## 2024-07-24 RX ORDER — FENTANYL CITRATE 50 UG/ML
INJECTION, SOLUTION INTRAMUSCULAR; INTRAVENOUS AS NEEDED
Status: DISCONTINUED | OUTPATIENT
Start: 2024-07-24 | End: 2024-07-24

## 2024-07-24 RX ORDER — OXYCODONE HYDROCHLORIDE 5 MG/1
2.5 TABLET ORAL EVERY 4 HOURS PRN
Status: DISCONTINUED | OUTPATIENT
Start: 2024-07-24 | End: 2024-07-24

## 2024-07-24 RX ORDER — HYDROMORPHONE HYDROCHLORIDE 1 MG/ML
0.5 INJECTION, SOLUTION INTRAMUSCULAR; INTRAVENOUS; SUBCUTANEOUS EVERY 5 MIN PRN
Status: DISCONTINUED | OUTPATIENT
Start: 2024-07-24 | End: 2024-07-24 | Stop reason: HOSPADM

## 2024-07-24 RX ORDER — HYDROMORPHONE HYDROCHLORIDE 1 MG/ML
INJECTION, SOLUTION INTRAMUSCULAR; INTRAVENOUS; SUBCUTANEOUS
Status: COMPLETED
Start: 2024-07-24 | End: 2024-07-24

## 2024-07-24 RX ORDER — GLIPIZIDE 5 MG/1
5 TABLET ORAL
Qty: 60 TABLET | Refills: 0 | Status: CANCELLED | OUTPATIENT
Start: 2024-07-24 | End: 2024-08-23

## 2024-07-24 SDOH — HEALTH STABILITY: MENTAL HEALTH: CURRENT SMOKER: 0

## 2024-07-24 ASSESSMENT — PAIN SCALES - GENERAL
PAINLEVEL_OUTOF10: 10 - WORST POSSIBLE PAIN
PAINLEVEL_OUTOF10: 6
PAINLEVEL_OUTOF10: 10 - WORST POSSIBLE PAIN
PAINLEVEL_OUTOF10: 4
PAINLEVEL_OUTOF10: 10 - WORST POSSIBLE PAIN
PAINLEVEL_OUTOF10: 3
PAINLEVEL_OUTOF10: 10 - WORST POSSIBLE PAIN
PAINLEVEL_OUTOF10: 8

## 2024-07-24 ASSESSMENT — COGNITIVE AND FUNCTIONAL STATUS - GENERAL
STANDING UP FROM CHAIR USING ARMS: A LOT
DAILY ACTIVITIY SCORE: 24
MOVING TO AND FROM BED TO CHAIR: A LOT
WALKING IN HOSPITAL ROOM: TOTAL
DAILY ACTIVITIY SCORE: 24
MOVING TO AND FROM BED TO CHAIR: A LOT
WALKING IN HOSPITAL ROOM: TOTAL
MOBILITY SCORE: 14
STANDING UP FROM CHAIR USING ARMS: A LOT
CLIMB 3 TO 5 STEPS WITH RAILING: TOTAL
MOBILITY SCORE: 14
CLIMB 3 TO 5 STEPS WITH RAILING: TOTAL

## 2024-07-24 ASSESSMENT — PAIN - FUNCTIONAL ASSESSMENT
PAIN_FUNCTIONAL_ASSESSMENT: 0-10

## 2024-07-24 NOTE — CARE PLAN
Problem: Pain  Goal: Free from opioid side effects throughout the shift  Outcome: Progressing  Note: Patient is being monitored for side effects  Goal: Free from acute confusion related to pain meds throughout the shift  Outcome: Progressing  Note: No related confusion    The patient's goals for the shift include Patient will maintain pain control     The clinical goals for the shift include Patient will maintain a safe environment

## 2024-07-24 NOTE — CARE PLAN
The patient's goals for the shift include Patient willreceived IV antibiotics as ordered this shift    The clinical goals for the shift include Pt will be NPO at midnight in lieu of surgery      Problem: Skin  Goal: Decreased wound size/increased tissue granulation at next dressing change  Outcome: Progressing  Flowsheets (Taken 7/24/2024 0222)  Decreased wound size/increased tissue granulation at next dressing change:   Promote sleep for wound healing   Utilize specialty bed per algorithm  Goal: Participates in plan/prevention/treatment measures  Outcome: Progressing  Goal: Prevent/manage excess moisture  Outcome: Progressing  Goal: Prevent/minimize sheer/friction injuries  Outcome: Progressing  Goal: Promote/optimize nutrition  Outcome: Progressing  Goal: Promote skin healing  Outcome: Progressing

## 2024-07-24 NOTE — PROGRESS NOTES
"  MEDICINE INPATIENT PROGRESS NOTE      Ramya Muller is a 69 y.o. female on day 6 of admission presenting with Diabetic ulcer of toe of left foot associated with diabetes mellitus of other type, unspecified ulcer stage (Multi).    Subjective   No acute events overnight. Patient had L hallux amputation with podiatry this morning. Was seen in the PACU. She denied any acute complaints.        Objective     Last Recorded Vitals  Blood pressure 127/76, pulse 80, temperature 36 °C (96.8 °F), temperature source Temporal, resp. rate 12, height 1.57 m (5' 1.81\"), weight 88.5 kg (195 lb 1.7 oz), SpO2 97%.  Intake/Output last 3 Shifts:  I/O last 3 completed shifts:  In: 750 (8.5 mL/kg) [IV Piggyback:750]  Out: - (0 mL/kg)   Weight: 88.5 kg       Physical Exam  Constitutional:       General: She is not in acute distress.     Appearance: Normal appearance.   HENT:      Head: Normocephalic and atraumatic.      Mouth/Throat:      Mouth: Mucous membranes are moist.   Eyes:      Extraocular Movements: Extraocular movements intact.   Cardiovascular:      Rate and Rhythm: Normal rate and regular rhythm.      Pulses: Normal pulses.      Heart sounds: Normal heart sounds.   Pulmonary:      Effort: Pulmonary effort is normal.      Breath sounds: Normal breath sounds.   Abdominal:      General: Abdomen is flat.      Palpations: Abdomen is soft.   Musculoskeletal:      Right lower leg: No edema.      Left lower leg: No edema.      Comments: L foot in dressing from OR   Skin:     General: Skin is warm and dry.   Neurological:      Mental Status: She is alert and oriented to person, place, and time.         Relevant Results    Labs    Results from last 7 days   Lab Units 07/23/24  0949 07/22/24  0900 07/21/24  1252   WBC AUTO x10*3/uL 10.8 10.8 11.9*   HEMOGLOBIN g/dL 11.5* 11.5* 10.9*   HEMATOCRIT % 35.9* 36.1 33.5*   PLATELETS AUTO x10*3/uL 327 280 287            Results from last 7 days   Lab Units 07/23/24  0949 07/20/24  0915 " "07/19/24  0804 07/18/24  1151   SODIUM mmol/L 132* 133* 136 138   POTASSIUM mmol/L 3.6 3.7 3.7 3.7   CO2 mmol/L 28 25 25 26   ANION GAP mmol/L 14 14 14 15   BUN mg/dL 14 14 20 20   CREATININE mg/dL 0.64 0.59 0.59 0.62   GLUCOSE mg/dL 213* 166* 130* 124*   EGFR mL/min/1.73m*2 >90 >90 >90 >90   MAGNESIUM mg/dL 2.06 2.20  --  1.98   PHOSPHORUS mg/dL 3.7 3.2 3.4  --       Results from last 7 days   Lab Units 07/18/24  1151   ALT U/L 13   AST U/L 21   ALK PHOS U/L 79            No lab exists for component: \"APTTT\"     Results from last 7 days   Lab Units 07/18/24  1151   LACTATE mmol/L 0.9        Medications  Scheduled medications  DULoxetine, 30 mg, oral, Daily  enoxaparin, 40 mg, subcutaneous, q24h  gabapentin, 300 mg, oral, Nightly  insulin lispro, 0-5 Units, subcutaneous, TID  piperacillin-tazobactam, 4.5 g, intravenous, q6h  polyethylene glycol, 17 g, oral, Daily  triamterene-hydrochlorothiazid, 1 tablet, oral, Daily  vancomycin, 1,250 mg, intravenous, q12h      Continuous medications     PRN medications  PRN medications: acetaminophen, dextrose, dextrose, glucagon, glucagon, oxyCODONE, vancomycin     Imaging  Lower extremity venous duplex bilateral    Result Date: 7/19/2024  No sonographic evidence for deep vein thrombosis within the evaluated veins of the bilateral lower extremities.   I personally reviewed the images/study and I agree with the findings as stated by Margarita Ng MD. This study was interpreted at [facility], [place], [place].   MACRO: None   Signed by: Giancarlo Castorena 7/19/2024 3:46 PM Dictation workstation:   [id number]    MR foot left wo IV contrast    Result Date: 7/19/2024  1. 1st interphalangeal region soft tissue ulcer with subjacent osteomyelitis of the 1st distal phalanx and distal aspect of 1st proximal phalanx. There is an additional medial fluid collection suspected with internal soft tissue gas which may reflect a tiny superficial abscess. 2. " Increased T2 signal throughout the subcutaneous soft tissues of the visualized lower extremity, consistent with generalized edema and/or cellulitis. 3. 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   I personally reviewed the images/study and I agree with the findings as stated. This study was interpreted at [place], [place].   MACRO: None   Signed by: Turner Salcedo 7/19/2024 9:19 AM Dictation workstation:   [id number]    XR foot left 3+ views    Result Date: 7/18/2024  1. Findings most compatible with osteomyelitis of the 1st toe with overlying wound/ulcer on the medial side of the toe. 2. Findings which may represent osteomyelitis of the head of the proximal phalanx of the 1st digit. 3. Soft tissue gas in the 1st toe for which differential considerations include sequelae of open nature of process gas-forming infection and/or any recent intervention. 4. MRI is recommended for further assessment.     I personally reviewed the images/study and I agree with the findings as stated. This study was interpreted at [place], [place].   MACRO: None   Signed by: Smooth Espinoza 7/18/2024 1:16 PM Dictation workstation:   [id number]    XR foot left 3+ views    Result Date: 7/7/2024  No acute osseous abnormality.  Moderate dorsal soft tissue swelling Signed by Dwight Villa MD          Assessment/Plan     Ramya Muller is a 69 y.o. female with poorly controlled T2DM (A1c 8%, 4/15/2024) initially treated for a diabetic foot soft tissue infection of the L 1st digit now presenting with worsening pain and with nonhealing ulcer that probes to bone with MRI L foot confirming osteomyelitis of L great toe with involvement of distal metatarsal w/o fluid collection (septic arthritis w/o drainable pocket).        Updates 7/24  -L hallux amputation with podiatry today, left open, will go back in a day or 2 for  closure  -C/w IV vanc/zosyn   -Tylenol, oxycodone 2.5mg for pain  -PVR pending    #L 1st digit diabetic ulcer  #Osteomyelitis  #Septic arthritis w/o drainable pocket  PLAN  -blood cxs 7/19: NGTD  -Vancomycin (7/18-)  -Zosyn 4.5 g q6h (7/18-)  -Pain control: duloxetine 30 mg daily (home med); tylenol 975mg q8h prn, oxycodone 2.5 mg q4h prn  -L hallux amputation with podiatry today, left open, will go back in a day or 2 for closure  -C/w IV vanc/zosyn  -PVR pending     #T2DM c/b neuropathy  A1c 8.6%; BG WNL  PLAN  -SSI #1 for now, requiring 2 units daily  -hypoglycemia protocol  -Endo and PCP OP follow up on discharge  -Initiate antihyperglycemic agent on discharge: glipizide 5mg daily, patient not interested in GLP1 at this time  -ASCVD risk at least 13.3%; Plan to start statin upon discharge  -Lipid panel ordered;      #Fibromyalgia and diabetic neuropathy  -C/w home Duloxetine 30 mg daily  -Added gabapentin 300mg nightly     #AFIB, resolved  -Chart diagnosis. Patient is unaware of this diagnosis. No record of rate/rhythm control meds. EKG normal, no palpitations      #Lower leg cramps  -Iron profile c/w ACD; consider iron repletion for RLS tx when infection resolves  -DVT US BLE 7/19 negative    #ACD  -Iron studies consistent with anemia of chronic disease (iron low, transferrin WNL; ferritin elevated)  PLAN  -See above for diabetic OM     F: prn  E: prn  N: Regular diet  A: PIV  DVT: lovenox  GI: not indicated     Bowel Regimen: miralax azeem daily  Code Status: Full code (confirmed on admission)  NOK: Jennifer (daughter): [phone]      Jimbo Wynn MD

## 2024-07-24 NOTE — SIGNIFICANT EVENT
Patient underwent left hallux amputation with podiatry today. Surgical site is left open with plan for return to OR later this week for delayed primary closure.    -Okay to resume all previous medications and to resume previous diet    -Pain meds, antibiotics, and DVT prophylaxis per primary team    -Dressing consists of Dakins-soaked packing in the surgical site, 4x4s, abd pad, webril, and ace bandage. Nursing staff may change or reinforce dressing as needed    -NWB to NOMAN Beavers DPM PGY-3

## 2024-07-24 NOTE — ANESTHESIA POSTPROCEDURE EVALUATION
Patient: Ramya Muller    Procedure Summary       Date: 07/24/24 Room / Location: [facility] OR 11 / [facility]    Anesthesia Start: 0921 Anesthesia Stop: 1027    Procedure: Amputation Digit Foot (Left) Diagnosis:       Other osteomyelitis of left foot (Multi)      (Other osteomyelitis of left foot (Multi) [M86.8X7])    Surgeons: Crow Herndon DPM Responsible Provider: Vish Ruiz MD    Anesthesia Type: general ASA Status: 3            Anesthesia Type: general    Vitals Value Taken Time   /72 07/24/24 1030   Temp 36 °C (96.8 °F) 07/24/24 1025   Pulse 81 07/24/24 1037   Resp 14 07/24/24 1037   SpO2 96 % 07/24/24 1037   Vitals shown include unfiled device data.    Anesthesia Post Evaluation    Patient location during evaluation: bedside  Patient participation: complete - patient participated  Level of consciousness: awake  Pain management: adequate  Airway patency: patent  Cardiovascular status: stable  Respiratory status: nasal cannula  Hydration status: acceptable  Postoperative Nausea and Vomiting: none        No notable events documented.

## 2024-07-24 NOTE — OP NOTE
PODIATRIC OPERATIVE REPORT    LOG ID: [id number]  SURGERY/PROCEDURE DATE: 7/24/2024  OR LOCATION: [facility] OR    SURGEON(S)/PROCEDURALIST(S) AND ASSISTANT(S):  Primary: Crow Herndon DPM  Resident - Assisting: Barry Beavers DPM    OTHER OR STAFF:  Circulator: Keren Clarke RN; Matias Best RN  Scrub Person: Jimbo Robb RN; Shelly Lopez    SURGERY/PROCEDURE(S):  Amputation Digit Foot  24914 - FL AMPUTATION TOE METATARSOPHALANGEAL JOINT      PRE-OP/PRE-PROCEDURE DIAGNOSIS:   Pre-op Diagnosis      * Other osteomyelitis of left foot (Multi) [M86.8X7]    POST-OP/POST-PROCEDURE DIAGNOSIS: Same as Pre-Op    ANESTHESIA:   Anesthesia: Consult  ASA: III  Anesthesia Staff: Anesthesiologist: Vish Ruiz MD  C-AA: BOB Webb  Intra-op Medications:   Administrations occurring from 0815 to 0930 on 07/24/24:   Medication Name Total Dose   DULoxetine (Cymbalta) DR capsule 30 mg Cannot be calculated   polyethylene glycol (Glycolax, Miralax) packet 17 g Cannot be calculated   triamterene-hydrochlorothiazid (Maxzide-25) 37.5-25 mg per tablet 1 tablet Cannot be calculated   vancomycin (Vancocin) in dextrose 5 % water (D5W) 250 mL IV 1,250 mg Cannot be calculated       ESTIMATED BLOOD LOSS: Minimal    SPECIMENS:   Order Name Source Comment Collection Info Order Time   FUNGAL CULTURE/SMEAR ABSCESS Pre-op diagnosis:  Other osteomyelitis of left foot (Multi) [M86.8X7] Collected By: Barry Beavers DPM 7/24/2024 10:05 AM     Release result to MyChart   Immediate        TISSUE/WOUND CULTURE/SMEAR ABSCESS Pre-op diagnosis:  Other osteomyelitis of left foot (Multi) [M86.8X7] Collected By: Barry Beavers DPM 7/24/2024 10:05 AM     Release result to MyChart   Immediate        FUNGAL CULTURE/SMEAR ABSCESS Pre-op diagnosis:  Other osteomyelitis of left foot (Multi) [M86.8X7] Collected By: Crow Herndon DPM 7/24/2024 10:12 AM     Release result to MyChart   Immediate        TISSUE/WOUND CULTURE/SMEAR ABSCESS  Patient elects to proceed with injection of Eylea today. Pre-op diagnosis:  Other osteomyelitis of left foot (Multi) [M86.8X7] Collected By: Crow Herndon DPM 7/24/2024 10:12 AM     Release result to [facility]   Immediate        SURGICAL PATHOLOGY EXAM DIGIT FIRST, LEFT FOOT Pre-op diagnosis:  Other osteomyelitis of left foot (Multi) [M86.8X7] Collected By: Crow Herndon DPM 7/24/2024 10:03 AM       IMPLANTABLE DEVICES:  Nothing was implanted during the procedure    FINDINGS: Intraoperative findings consistent with clinical and radiographic findings.    DRAINS: None    TOURNIQUET TIMES:       COMPLICATIONS: None; patient tolerated the procedure well.       SURGERY/PROCEDURE DETAILS:  INDICATIONS FOR PROCEDURE:   The patient with diagnosis as outlined above presents for podiatric surgical intervention today. The patient has attempted and failed conservative treatment and wishes to proceed with surgical intervention at this time. The nature of the deformity, problems anticipated procedures, recovery/convalescence, risks/complications including but not limited to numbness, CRPS, over/under correction, problems healing of soft tissue or bone, postoperative wound infection, wound dehiscence, DVT and/or persistent pain/disability have been explained to the patient in detail. An updated H&P and consent have been completed prior to today’s surgical intervention. The patient states that they have been NPO since midnight. No guarantees were given or implied, but it is expected that the patient will have a favorable outcome.  It is with this understanding that we proceed.     PRE-PROCEDURE INFORMATION:  In pre-op holding area, the  extremity to be operated on was clearly marked and the patient verified correct laterality of the marking.  The patient was brought to the operating room and remained on the cart in the supine position.  A timeout was performed in which identification of the correct patient, procedure, location, and materials was done. Following IV sedation, local  anesthesia was obtained utilizing 10cc of 1:1 1% lidocaine plain and 0.5% bupivacaine plain. The left foot was then scrubbed, prepped and draped in the usual aseptic manner.     DESCRIPTION OF PROCEDURE:   Attention was directed to the left hallux where a 10 blade was used to create a medial-racket style incision across the first metatarsophalangeal joint. The joint was identified and the proximal phalanx was noted to be soft and friable. A combination of pickups, the 10 blade, a key elevator, and foster scissors were used to remove soft tissue attachments on the proximal phalanx. The toe was removed from the field and sent as specimen. A ronguer and 15 blade were then used to resect infected-appearing tissue. The Misonix was introduced and was used to copiously irrigate and ultrasonically debride down to and including the level of tendon at the surgical site. A post-lavage culture was taken. Clean margin bone specimens were taken and sent to path and [place]. Based on the extent of infection and timing of surgery after initial presentation, decision was made to delay closure. The surgical site was packed with Dakins-soaked packing and a dressing was applied consisting of 4x4s, abd pad, webril wrap, and an ace bandage.    POSTOPERATIVE INFORMATION: The patient tolerated the above noted procedure and anesthesia well and was transferred to the PACU with vital signs stable, and vascular status intact with capillary refill intact to the most distal aspect of the operative extremity.  Patient will return to the floor and return to OR later for delayed primary closure.    Barry Beavers DPM PGY-3      Attending Attestation:   I was present for the entirety of the procedure(s).     Crow Herndon DPM      SIGNATURE: Barry Beavers DPM PATIENT NAME: Ramya Muller   DATE: July 24, 2024 MRN: [id number]   TIME: 10:30 AM

## 2024-07-25 ENCOUNTER — APPOINTMENT (OUTPATIENT)
Dept: VASCULAR MEDICINE | Facility: HOSPITAL | Age: 69
DRG: 617 | End: 2024-07-25
Payer: COMMERCIAL

## 2024-07-25 LAB
BASOPHILS # BLD AUTO: 0.07 X10*3/UL (ref 0–0.1)
BASOPHILS NFR BLD AUTO: 0.6 %
EOSINOPHIL # BLD AUTO: 0.24 X10*3/UL (ref 0–0.7)
EOSINOPHIL NFR BLD AUTO: 2.1 %
ERYTHROCYTE [DISTWIDTH] IN BLOOD BY AUTOMATED COUNT: 11.7 % (ref 11.5–14.5)
GLUCOSE BLD MANUAL STRIP-MCNC: 117 MG/DL (ref 74–99)
GLUCOSE BLD MANUAL STRIP-MCNC: 149 MG/DL (ref 74–99)
GLUCOSE BLD MANUAL STRIP-MCNC: 169 MG/DL (ref 74–99)
GLUCOSE BLD MANUAL STRIP-MCNC: 98 MG/DL (ref 74–99)
HCT VFR BLD AUTO: 33.8 % (ref 36–46)
HGB BLD-MCNC: 10.7 G/DL (ref 12–16)
IMM GRANULOCYTES # BLD AUTO: 0.11 X10*3/UL (ref 0–0.7)
IMM GRANULOCYTES NFR BLD AUTO: 1 % (ref 0–0.9)
LYMPHOCYTES # BLD AUTO: 1.69 X10*3/UL (ref 1.2–4.8)
LYMPHOCYTES NFR BLD AUTO: 15 %
MCH RBC QN AUTO: 25.3 PG (ref 26–34)
MCHC RBC AUTO-ENTMCNC: 31.7 G/DL (ref 32–36)
MCV RBC AUTO: 80 FL (ref 80–100)
MONOCYTES # BLD AUTO: 1.01 X10*3/UL (ref 0.1–1)
MONOCYTES NFR BLD AUTO: 9 %
NEUTROPHILS # BLD AUTO: 8.16 X10*3/UL (ref 1.2–7.7)
NEUTROPHILS NFR BLD AUTO: 72.3 %
NRBC BLD-RTO: 0 /100 WBCS (ref 0–0)
PLATELET # BLD AUTO: 297 X10*3/UL (ref 150–450)
RBC # BLD AUTO: 4.23 X10*6/UL (ref 4–5.2)
VANCOMYCIN SERPL-MCNC: 16.4 UG/ML (ref 5–20)
WBC # BLD AUTO: 11.3 X10*3/UL (ref 4.4–11.3)

## 2024-07-25 PROCEDURE — 93922 UPR/L XTREMITY ART 2 LEVELS: CPT | Performed by: SURGERY

## 2024-07-25 PROCEDURE — 2500000001 HC RX 250 WO HCPCS SELF ADMINISTERED DRUGS (ALT 637 FOR MEDICARE OP)

## 2024-07-25 PROCEDURE — 80202 ASSAY OF VANCOMYCIN: CPT | Performed by: INTERNAL MEDICINE

## 2024-07-25 PROCEDURE — 85025 COMPLETE CBC W/AUTO DIFF WBC: CPT

## 2024-07-25 PROCEDURE — 2500000004 HC RX 250 GENERAL PHARMACY W/ HCPCS (ALT 636 FOR OP/ED)

## 2024-07-25 PROCEDURE — 82947 ASSAY GLUCOSE BLOOD QUANT: CPT

## 2024-07-25 PROCEDURE — 93923 UPR/LXTR ART STDY 3+ LVLS: CPT

## 2024-07-25 PROCEDURE — 36415 COLL VENOUS BLD VENIPUNCTURE: CPT | Performed by: INTERNAL MEDICINE

## 2024-07-25 PROCEDURE — 93922 UPR/L XTREMITY ART 2 LEVELS: CPT

## 2024-07-25 PROCEDURE — 1100000001 HC PRIVATE ROOM DAILY

## 2024-07-25 PROCEDURE — 99232 SBSQ HOSP IP/OBS MODERATE 35: CPT

## 2024-07-25 PROCEDURE — 36415 COLL VENOUS BLD VENIPUNCTURE: CPT

## 2024-07-25 PROCEDURE — 2500000005 HC RX 250 GENERAL PHARMACY W/O HCPCS

## 2024-07-25 RX ORDER — TRAMADOL HYDROCHLORIDE 50 MG/1
50 TABLET ORAL EVERY 6 HOURS PRN
Status: DISCONTINUED | OUTPATIENT
Start: 2024-07-25 | End: 2024-07-25

## 2024-07-25 RX ORDER — AMOXICILLIN AND CLAVULANATE POTASSIUM 875; 125 MG/1; MG/1
1 TABLET, FILM COATED ORAL EVERY 12 HOURS SCHEDULED
Status: COMPLETED | OUTPATIENT
Start: 2024-07-25 | End: 2024-07-28

## 2024-07-25 RX ORDER — OXYCODONE HYDROCHLORIDE 5 MG/1
2.5 TABLET ORAL EVERY 4 HOURS PRN
Status: DISCONTINUED | OUTPATIENT
Start: 2024-07-25 | End: 2024-07-25

## 2024-07-25 RX ORDER — TRAMADOL HYDROCHLORIDE 50 MG/1
75 TABLET ORAL EVERY 6 HOURS PRN
Status: DISCONTINUED | OUTPATIENT
Start: 2024-07-25 | End: 2024-07-25

## 2024-07-25 RX ORDER — TRAMADOL HYDROCHLORIDE 50 MG/1
50 TABLET ORAL EVERY 6 HOURS PRN
Status: DISCONTINUED | OUTPATIENT
Start: 2024-07-25 | End: 2024-07-30 | Stop reason: HOSPADM

## 2024-07-25 RX ORDER — HYDROMORPHONE HYDROCHLORIDE 1 MG/ML
0.2 INJECTION, SOLUTION INTRAMUSCULAR; INTRAVENOUS; SUBCUTANEOUS ONCE
Status: COMPLETED | OUTPATIENT
Start: 2024-07-25 | End: 2024-07-25

## 2024-07-25 RX ORDER — DOXYCYCLINE HYCLATE 100 MG
100 TABLET ORAL EVERY 12 HOURS SCHEDULED
Status: COMPLETED | OUTPATIENT
Start: 2024-07-25 | End: 2024-07-28

## 2024-07-25 RX ORDER — ACETAMINOPHEN 325 MG/1
975 TABLET ORAL EVERY 8 HOURS
Status: DISCONTINUED | OUTPATIENT
Start: 2024-07-25 | End: 2024-07-30 | Stop reason: HOSPADM

## 2024-07-25 RX ORDER — OXYCODONE HYDROCHLORIDE 5 MG/1
5 TABLET ORAL EVERY 6 HOURS PRN
Status: DISCONTINUED | OUTPATIENT
Start: 2024-07-25 | End: 2024-07-27

## 2024-07-25 ASSESSMENT — PAIN - FUNCTIONAL ASSESSMENT
PAIN_FUNCTIONAL_ASSESSMENT: 0-10
PAIN_FUNCTIONAL_ASSESSMENT: 0-10

## 2024-07-25 ASSESSMENT — PAIN SCALES - GENERAL
PAINLEVEL_OUTOF10: 0 - NO PAIN
PAINLEVEL_OUTOF10: 10 - WORST POSSIBLE PAIN
PAINLEVEL_OUTOF10: 8

## 2024-07-25 NOTE — PROGRESS NOTES
Vancomycin Dosing by Pharmacy- Cessation of Therapy    Consult to pharmacy for vancomycin dosing has been discontinued by the prescriber, pharmacy will sign off at this time.    Please call pharmacy if there are further questions or re-enter a consult if vancomycin is resumed.     Tapan Ortez, PharmD

## 2024-07-25 NOTE — NURSING NOTE
Ms. Muller is resting in bed -- feels fatigued today.  States she is to go to OR in am for closure of the surgical site.  Reviewed with her that at discharge she will be prescribed Glipizide for diabetes management.  Education provided on how this med works, when to take and side effects.  She verbalizes understanding.  Time spent:  15 mins.

## 2024-07-25 NOTE — CARE PLAN
The patient's goals for the shift include Patient willreceived IV antibiotics as ordered this shift    The clinical goals for the shift include Patient's pain will remain within patient's acceptable limits this shift.      Problem: Skin  Goal: Decreased wound size/increased tissue granulation at next dressing change  Outcome: Progressing  Flowsheets (Taken 7/25/2024 0729)  Decreased wound size/increased tissue granulation at next dressing change: Promote sleep for wound healing  Goal: Participates in plan/prevention/treatment measures  Outcome: Progressing  Flowsheets (Taken 7/25/2024 0729)  Participates in plan/prevention/treatment measures: Increase activity/out of bed for meals  Goal: Prevent/manage excess moisture  Outcome: Progressing  Flowsheets (Taken 7/25/2024 0729)  Prevent/manage excess moisture: Moisturize dry skin  Goal: Prevent/minimize sheer/friction injuries  Outcome: Progressing  Flowsheets (Taken 7/25/2024 0729)  Prevent/minimize sheer/friction injuries: Increase activity/out of bed for meals  Goal: Promote/optimize nutrition  Outcome: Progressing  Flowsheets (Taken 7/25/2024 0729)  Promote/optimize nutrition: Monitor/record intake including meals  Goal: Promote skin healing  Outcome: Progressing  Flowsheets (Taken 7/25/2024 0729)  Promote skin healing: Assess skin/pad under line(s)/device(s)     Problem: Pain  Goal: Takes deep breaths with improved pain control throughout the shift  Outcome: Progressing  Goal: Turns in bed with improved pain control throughout the shift  Outcome: Progressing  Goal: Walks with improved pain control throughout the shift  Outcome: Progressing  Goal: Performs ADL's with improved pain control throughout shift  Outcome: Progressing  Goal: Participates in PT with improved pain control throughout the shift  Outcome: Progressing  Goal: Free from opioid side effects throughout the shift  Outcome: Progressing  Goal: Free from acute confusion related to pain meds throughout  the shift  Outcome: Progressing

## 2024-07-25 NOTE — PROGRESS NOTES
"  MEDICINE INPATIENT PROGRESS NOTE      Ramya Muller is a 69 y.o. female on day 7 of admission presenting with Diabetic ulcer of toe of left foot associated with diabetes mellitus of other type, unspecified ulcer stage (Multi).    Subjective   Patient had some pain overnight requiring IV dilaudid. She had difficulty sleeping due to the pain. She is tolerating PO intake and having bowel movements. She reports some itching and says she has dry skin.       Objective     Last Recorded Vitals  Blood pressure 119/72, pulse 91, temperature 36.3 °C (97.3 °F), resp. rate 17, height 1.57 m (5' 1.81\"), weight 88.5 kg (195 lb 1.7 oz), SpO2 97%.  Intake/Output last 3 Shifts:  I/O last 3 completed shifts:  In: 1248.3 (14.1 mL/kg) [P.O.:240; I.V.:358.3 (4 mL/kg); IV Piggyback:650]  Out: 15 (0.2 mL/kg) [Urine:5 (0 mL/kg/hr); Blood:10]  Weight: 88.5 kg       Physical Exam  Constitutional:       General: She is not in acute distress.     Appearance: Normal appearance.   HENT:      Head: Normocephalic and atraumatic.      Mouth/Throat:      Mouth: Mucous membranes are moist.   Eyes:      Extraocular Movements: Extraocular movements intact.   Cardiovascular:      Rate and Rhythm: Normal rate and regular rhythm.      Pulses: Normal pulses.      Heart sounds: Normal heart sounds.   Pulmonary:      Effort: Pulmonary effort is normal.      Breath sounds: Normal breath sounds.   Abdominal:      General: Abdomen is flat.      Palpations: Abdomen is soft.   Musculoskeletal:      Right lower leg: No edema.      Left lower leg: No edema.      Comments: L foot in dressing from OR   Skin:     General: Skin is warm and dry.   Neurological:      Mental Status: She is alert and oriented to person, place, and time.         Relevant Results    Labs    Results from last 7 days   Lab Units 07/24/24  1452 07/23/24  0949 07/22/24  0900   WBC AUTO x10*3/uL 9.9 10.8 10.8   HEMOGLOBIN g/dL 11.4* 11.5* 11.5*   HEMATOCRIT % 35.5* 35.9* 36.1   PLATELETS AUTO " x10*3/uL 339 327 280            Results from last 7 days   Lab Units 07/23/24  0949 07/20/24  0915 07/19/24  0804 07/18/24  1151   SODIUM mmol/L 132* 133* 136 138   POTASSIUM mmol/L 3.6 3.7 3.7 3.7   CO2 mmol/L 28 25 25 26   ANION GAP mmol/L 14 14 14 15   BUN mg/dL 14 14 20 20   CREATININE mg/dL 0.64 0.59 0.59 0.62   GLUCOSE mg/dL 213* 166* 130* 124*   EGFR mL/min/1.73m*2 >90 >90 >90 >90   MAGNESIUM mg/dL 2.06 2.20  --  1.98   PHOSPHORUS mg/dL 3.7 3.2 3.4  --       Results from last 7 days   Lab Units 07/18/24  1151   ALT U/L 13   AST U/L 21   ALK PHOS U/L 79      Results from last 7 days   Lab Units 07/24/24  1452   INR  1.1        Results from last 7 days   Lab Units 07/18/24  1151   LACTATE mmol/L 0.9        Medications  Scheduled medications  acetaminophen, 975 mg, oral, q8h  DULoxetine, 30 mg, oral, Daily  enoxaparin, 40 mg, subcutaneous, q24h  gabapentin, 300 mg, oral, TID  insulin lispro, 0-5 Units, subcutaneous, TID  piperacillin-tazobactam, 4.5 g, intravenous, q6h  polyethylene glycol, 17 g, oral, Daily  triamterene-hydrochlorothiazid, 1 tablet, oral, Daily  vancomycin, 1,250 mg, intravenous, q12h         PRN medications  PRN medications: dextrose, dextrose, glucagon, glucagon, HYDROmorphone, traMADol, vancomycin     Imaging  Lower extremity venous duplex bilateral    Result Date: 7/19/2024  No sonographic evidence for deep vein thrombosis within the evaluated veins of the bilateral lower extremities.   I personally reviewed the images/study and I agree with the findings as stated by Margarita Ng MD. This study was interpreted at [place], [place].   MACRO: None   Signed by: Giancarlo Castorena 7/19/2024 3:46 PM Dictation workstation:   [id number]    MR foot left wo IV contrast    Result Date: 7/19/2024  1. 1st interphalangeal region soft tissue ulcer with subjacent osteomyelitis of the 1st distal phalanx and distal aspect of 1st proximal phalanx. There is  an additional medial fluid collection suspected with internal soft tissue gas which may reflect a tiny superficial abscess. 2. Increased T2 signal throughout the subcutaneous soft tissues of the visualized lower extremity, consistent with generalized edema and/or cellulitis. 3. 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   I personally reviewed the images/study and I agree with the findings as stated. This study was interpreted at [place], [place].   MACRO: None   Signed by: Turner Salcedo 7/19/2024 9:19 AM Dictation workstation:   [id number]    XR foot left 3+ views    Result Date: 7/18/2024  1. Findings most compatible with osteomyelitis of the 1st toe with overlying wound/ulcer on the medial side of the toe. 2. Findings which may represent osteomyelitis of the head of the proximal phalanx of the 1st digit. 3. Soft tissue gas in the 1st toe for which differential considerations include sequelae of open nature of process gas-forming infection and/or any recent intervention. 4. MRI is recommended for further assessment.     I personally reviewed the images/study and I agree with the findings as stated. This study was interpreted at [place], [place].   MACRO: None   Signed by: Smooth Espinoza 7/18/2024 1:16 PM Dictation workstation:   [id number]    XR foot left 3+ views    Result Date: 7/7/2024  No acute osseous abnormality.  Moderate dorsal soft tissue swelling Signed by Dwight Villa MD          Assessment/Plan     Ramya Muller is a 69 y.o. female with poorly controlled T2DM (A1c 8%, 4/15/2024) initially treated for a diabetic foot soft tissue infection of the L 1st digit now presenting with worsening pain and with nonhealing ulcer that probes to bone with MRI L foot confirming osteomyelitis of L great toe with involvement of distal metatarsal w/o fluid collection (septic arthritis w/o  drainable pocket).        Updates 7/25  -closure of L hallux amputation with podiatry tomorrow  -DC vanc/zosyn, switch to augmentin and doxycycline for 48 hours  -Scheduled tylenol 975mg q8h, oxy 2.5mg q4h prn, oxy 5mg q6h prn, dialudid 0.4 mg q4h prn, tramadol 50mg q6h prn  -gabapentin 300mg TID  -PVR pending  -eucerin cream for itching    #L 1st digit diabetic ulcer  #Osteomyelitis  #Septic arthritis w/o drainable pocket  PLAN  -blood cxs 7/19: NGTD  -Pain control: duloxetine 30 mg daily (home med); tylenol 975mg q8h scheduled, oxy 2.5mg q4h prn, oxy 5mg q6h prn, dialudid 0.4 mg q4h prn, tramadol 50mg q6h prn  -closure of L hallux amputation with podiatry tomorrow  -DC vanc/zosyn, switch to augmentin and doxycycline for 48-72 hours  -PVR pending  -NPO midnight     #T2DM c/b neuropathy  A1c 8.6%; BG WNL  PLAN  -SSI #1 for now, requiring 2 units daily  -hypoglycemia protocol  -Endo and PCP OP follow up on discharge  -Initiate antihyperglycemic agent on discharge: glipizide 5mg daily, patient not interested in GLP1 at this time  -ASCVD risk at least 13.3%; patient has reported anaphylaxis to statins  -Lipid panel ordered;      #Fibromyalgia and diabetic neuropathy  -C/w home Duloxetine 30 mg daily  -gabapentin 300mg TID     #AFIB, resolved  -Chart diagnosis. Patient is unaware of this diagnosis. No record of rate/rhythm control meds. EKG normal, no palpitations      #Lower leg cramps  -Iron profile c/w ACD; consider iron repletion for RLS tx when infection resolves  -DVT US BLE 7/19 negative    #ACD  -Iron studies consistent with anemia of chronic disease (iron low, transferrin WNL; ferritin elevated)  PLAN  -See above for diabetic OM     F: prn  E: prn  N: NPO at midnight for wound closure tomorrow  A: PIV  DVT: lovenox  GI: not indicated     Bowel Regimen: miralax azeem daily  Code Status: Full code (confirmed on admission)  NOK: Laytoya (daughter): [phone]      Jimbo Wynn MD

## 2024-07-25 NOTE — PROGRESS NOTES
PODIATRY SERVICE CONSULT PROGRESS NOTE    SERVICE DATE: 7/25/2024   SERVICE TIME:  1100    Subjective   INTERVAL HPI: Patient was seen POD 1 following left Hallux amputation with incision site left open for closure Friday 07/26. Patient complains of pain at operation site.   Pt was seen at bedside.  Pain is no well controlled.  Patient denies any constitutional symptoms.   No other pedal complaints.       Medications:  Scheduled Meds: acetaminophen, 975 mg, oral, q8h  amoxicillin-pot clavulanate, 1 tablet, oral, q12h DAXA  doxycycline, 100 mg, oral, q12h DAXA  DULoxetine, 30 mg, oral, Daily  enoxaparin, 40 mg, subcutaneous, q24h  gabapentin, 300 mg, oral, TID  insulin lispro, 0-5 Units, subcutaneous, TID  polyethylene glycol, 17 g, oral, Daily  triamterene-hydrochlorothiazid, 1 tablet, oral, Daily      Continuous Infusions:    PRN Meds: PRN medications: dextrose, dextrose, eucerin, glucagon, glucagon, HYDROmorphone, oxyCODONE, traMADol         Objective   PHYSICAL EXAM:  Physical Exam Performed:  Vitals:    07/25/24 0525   BP: 119/72   Pulse: 91   Resp: 17   Temp: 36.3 °C (97.3 °F)   SpO2: 97%     Body mass index is 35.9 kg/m².    Patient is AOx3 and in no acute distress. Patient is alert and cooperative. Sitting comfortably in bed with dressing clean, dry and intact. Heel off-loading boots in place B/L.    Vascular: Palpable Dorsalis Pedis /Posterior Tibial pulses Bilaterally. Mild non-pitting edema noted Bilaterally. Hair growth absent Bilaterally. Capillary Fill Time <5 to Bilaterally hallux. Temperature is warm to cool from tibial tuberosity to distal digits Bilaterally. No lymphatic streaking noted Bilaterally.    Musculoskeletal: Gross active and passive ROM intact to age and activity level. Moves all extremities spontaneously. No pain to palpation at feet Bilaterally. Left hallux amputation     Neurological: Intact light touch sensation Bilaterally. Pain stimuli intact Bilaterally. Denies any numbness, burning  or tingling.    Dermatologic: Nails 1-5 are thickened, elongated, discolored with subungual debris Bilaterally. Skin appears well hydrated Bilaterally. Web spaces 1-4 Bilaterally are clean, dry and intact. No rashes or nodules noted Bilaterally. No hyperkeratotic tissue noted Bilaterally.     Wound:    Left hallux amputation site  Mixed wound base of Granular tissue.   Moderate serosanguinous drainage with fibrotic slough.   Mild sina-wound maceration.   No sina-wound erythema.   No evidence of ascending cellulitis or lymphangitis.  No palpable fluctuance. No malodor. No increased warmth.   Significant probe to bone or deep structures within the wound base.   Mild tunneling or tracking.   Mild undermining. Skin edges irregular but intact.      LABS:   Results for orders placed or performed during the hospital encounter of 07/18/24 (from the past 24 hour(s))   Type and screen   Result Value Ref Range    ABO TYPE O     Rh TYPE POS     ANTIBODY SCREEN NEG    CBC and Auto Differential   Result Value Ref Range    WBC 9.9 4.4 - 11.3 x10*3/uL    nRBC 0.0 0.0 - 0.0 /100 WBCs    RBC 4.46 4.00 - 5.20 x10*6/uL    Hemoglobin 11.4 (L) 12.0 - 16.0 g/dL    Hematocrit 35.5 (L) 36.0 - 46.0 %    MCV 80 80 - 100 fL    MCH 25.6 (L) 26.0 - 34.0 pg    MCHC 32.1 32.0 - 36.0 g/dL    RDW 11.6 11.5 - 14.5 %    Platelets 339 150 - 450 x10*3/uL    Neutrophils % 69.4 40.0 - 80.0 %    Immature Granulocytes %, Automated 1.1 (H) 0.0 - 0.9 %    Lymphocytes % 18.9 13.0 - 44.0 %    Monocytes % 7.0 2.0 - 10.0 %    Eosinophils % 2.9 0.0 - 6.0 %    Basophils % 0.7 0.0 - 2.0 %    Neutrophils Absolute 6.86 1.20 - 7.70 x10*3/uL    Immature Granulocytes Absolute, Automated 0.11 0.00 - 0.70 x10*3/uL    Lymphocytes Absolute 1.87 1.20 - 4.80 x10*3/uL    Monocytes Absolute 0.69 0.10 - 1.00 x10*3/uL    Eosinophils Absolute 0.29 0.00 - 0.70 x10*3/uL    Basophils Absolute 0.07 0.00 - 0.10 x10*3/uL   Protime-INR   Result Value Ref Range    Protime 12.2 9.8 - 12.8  seconds    INR 1.1 0.9 - 1.1   POCT GLUCOSE   Result Value Ref Range    POCT Glucose 169 (H) 74 - 99 mg/dL   POCT GLUCOSE   Result Value Ref Range    POCT Glucose 143 (H) 74 - 99 mg/dL   POCT GLUCOSE   Result Value Ref Range    POCT Glucose 149 (H) 74 - 99 mg/dL   CBC and Auto Differential   Result Value Ref Range    WBC 11.3 4.4 - 11.3 x10*3/uL    nRBC 0.0 0.0 - 0.0 /100 WBCs    RBC 4.23 4.00 - 5.20 x10*6/uL    Hemoglobin 10.7 (L) 12.0 - 16.0 g/dL    Hematocrit 33.8 (L) 36.0 - 46.0 %    MCV 80 80 - 100 fL    MCH 25.3 (L) 26.0 - 34.0 pg    MCHC 31.7 (L) 32.0 - 36.0 g/dL    RDW 11.7 11.5 - 14.5 %    Platelets 297 150 - 450 x10*3/uL    Neutrophils % 72.3 40.0 - 80.0 %    Immature Granulocytes %, Automated 1.0 (H) 0.0 - 0.9 %    Lymphocytes % 15.0 13.0 - 44.0 %    Monocytes % 9.0 2.0 - 10.0 %    Eosinophils % 2.1 0.0 - 6.0 %    Basophils % 0.6 0.0 - 2.0 %    Neutrophils Absolute 8.16 (H) 1.20 - 7.70 x10*3/uL    Immature Granulocytes Absolute, Automated 0.11 0.00 - 0.70 x10*3/uL    Lymphocytes Absolute 1.69 1.20 - 4.80 x10*3/uL    Monocytes Absolute 1.01 (H) 0.10 - 1.00 x10*3/uL    Eosinophils Absolute 0.24 0.00 - 0.70 x10*3/uL    Basophils Absolute 0.07 0.00 - 0.10 x10*3/uL   Vancomycin   Result Value Ref Range    Vancomycin 16.4 5.0 - 20.0 ug/mL   POCT GLUCOSE   Result Value Ref Range    POCT Glucose 117 (H) 74 - 99 mg/dL      Lab Results   Component Value Date    HGBA1C 8.6 (H) 07/18/2024      Lab Results   Component Value Date    CRP 14.69 (H) 07/18/2024      Lab Results   Component Value Date    SEDRATE 84 (H) 07/18/2024        Results from last 7 days   Lab Units 07/25/24  0759   WBC AUTO x10*3/uL 11.3   RBC AUTO x10*6/uL 4.23   HEMOGLOBIN g/dL 10.7*   HEMATOCRIT % 33.8*     Results from last 7 days   Lab Units 07/23/24  0949   SODIUM mmol/L 132*   POTASSIUM mmol/L 3.6   CHLORIDE mmol/L 94*   CO2 mmol/L 28   BUN mg/dL 14   CREATININE mg/dL 0.64   CALCIUM mg/dL 8.9   PHOSPHORUS mg/dL 3.7   MAGNESIUM mg/dL 2.06            IMAGING REVIEW:  ECG 12 Lead    Result Date: 7/22/2024   Poor data quality, interpretation may be adversely affected Normal sinus rhythm Right bundle branch block Abnormal ECG No previous ECGs available Confirmed by Vince Sullivan ([id number]) on 7/22/2024 5:07:39 PM    Lower extremity venous duplex bilateral    Result Date: 7/19/2024  Interpreted By:  Giancarlo Castorena and Calo Sean-Matthew STUDY: [facility] US LOWER EXTREMITY VENOUS DUPLEX BILATERAL;  7/19/2024 3:10 pm   INDICATION: Signs/Symptoms:Risk for DVT, complaining of bilateral calf pain / cramps and tightness.   COMPARISON: None.   ACCESSION NUMBER(S): [id number]   ORDERING CLINICIAN: AASHISH NEWSOME   TECHNIQUE: Vascular ultrasound of the bilateral lower extremities was performed. Real-time compression views as well as Gray scale, color Doppler and spectral Doppler waveform analysis was performed.   FINDINGS: Evaluation of the visualized portions of the bilateral common femoral, proximal, mid, and distal femoral, and popliteal veins was performed.  Evaluation of the visualized portions of the bilateral posterior tibial and left peroneal veins was also performed.   Limitations: None.   The evaluated veins demonstrate normal compressibility. There is intact venous flow demonstrating normal respiratory variability and normal augmentation of flow with calf compression.       No sonographic evidence for deep vein thrombosis within the evaluated veins of the bilateral lower extremities.   I personally reviewed the images/study and I agree with the findings as stated by Margarita Ng MD. This study was interpreted at [place], [place].   MACRO: None   Signed by: Giancarlo Castorena 7/19/2024 3:46 PM Dictation workstation:   [id number]    MR foot left wo IV contrast    Result Date: 7/19/2024  Interpreted By:  Turner Salcedo and Baker Zachary STUDY: MRI of the left foot and ankle without IV contrast; 7/19/2024 1:17  am   INDICATION: Signs/Symptoms:Diabetic foot with osteomyelitis with edema in whole left foot.   COMPARISON: Left foot radiographs on 07/18/2024.   ACCESSION NUMBER(S): [id number]   ORDERING CLINICIAN: JERROD GONZALEZ   TECHNIQUE: MR imaging of the  left foot and ankle was obtained  without administration of intravenous contrast medium.   FINDINGS: TENDONS: There is Achilles tendinosis. This peroneal tendinosis. There thickening of the plantar fascia. The remainder of the tendons are intact.   LIGAMENTS: The major ligamentous structures of the forefoot are normal. The Lisfranc ligament is normal.   JOINTS/OSSEOUS STRUCTURES: There is T1 hypointense signal with associated T2/STIR hyperintensity involving the entirety of the 1st distal phalanx, consistent with osteomyelitis. Additional areas of irregular T1 hypointensity with associated T2/STIR hyperintensity throughout the mid to distal 1st proximal phalanx, likely representing additional site of osteomyelitis. There is also focus of T2 hyperintense signal with associated T1 hypointense signal within distal 1st metatarsal (series 10, image 16), favored to to be degenerative in nature. There is no evidence of significant joint effusion in the forefoot or midfoot. There is no evidence of acute fracture or dislocation.   SOFT TISSUES: There is skin surface irregularity and ulceration along the medial aspect of the distal 1st digit with significant soft tissue edema about the entirety of the 1st digit. There is a questionable relatively conglomerate focus of T2 hyperintense signal at the plantar medial aspect of the 1st proximal phalangeal head measuring approximately 14 x 11 mm (series 10, image 16). This is very superficial and likely contiguous with the sinus tract. There is generalized atrophy of the musculature of the foot. There is additionally increased T2 signal intensity throughout the musculature. There is edema throughout the subcutaneous soft tissues of the  visualized foot.       1. 1st interphalangeal region soft tissue ulcer with subjacent osteomyelitis of the 1st distal phalanx and distal aspect of 1st proximal phalanx. There is an additional medial fluid collection suspected with internal soft tissue gas which may reflect a tiny superficial abscess. 2. Increased T2 signal throughout the subcutaneous soft tissues of the visualized lower extremity, consistent with generalized edema and/or cellulitis. 3. 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   I personally reviewed the images/study and I agree with the findings as stated. This study was interpreted at [place], [place].   MACRO: None   Signed by: Turner Salcedo 7/19/2024 9:19 AM Dictation workstation:   [id number]    XR foot left 3+ views    Result Date: 7/18/2024  Interpreted By:  Smooth Espinoza and Booth Cameron STUDY: XR FOOT LEFT 3+ VIEWS; ;  7/18/2024 12:20 pm   INDICATION: Signs/Symptoms:L toe infection.   COMPARISON: XR left foot 07/06/2024   ACCESSION NUMBER(S): [id number]   ORDERING CLINICIAN: ALEJANDRO DUTTA   FINDINGS: Decreased radiodensity 1st toe distal phalanx with loss of normal trabecular architecture and erosion of overlying cortex, with destruction of the bone stock. There is also medial displacement of eroded cortical fragments. There is question of some lucency with erosion to the medial side of the head of the proximal phalanx of the 1st digit. Osteophyte present at the plantar aspect of the calcaneus. Enthesophyte noted at posterior aspect of calcaneal tuberosity. Mild degenerative changes of the talocalcaneonavicular joint and forefoot, worst at 1st MTP and DIP joints. There is a wound/ulcer on the distal medial aspect of the 1st toe. There are mottled foci of lucency projecting over the 1st toe most evident along the medial aspect of the toe at the level of the distal metaphysis of the proximal phalanx       1.  Findings most compatible with osteomyelitis of the 1st toe with overlying wound/ulcer on the medial side of the toe. 2. Findings which may represent osteomyelitis of the head of the proximal phalanx of the 1st digit. 3. Soft tissue gas in the 1st toe for which differential considerations include sequelae of open nature of process gas-forming infection and/or any recent intervention. 4. MRI is recommended for further assessment.     I personally reviewed the images/study and I agree with the findings as stated. This study was interpreted at [place], [place].   MACRO: None   Signed by: Smooth Espinoza 2024 1:16 PM Dictation workstation:   [id number]    XR foot left 3+ views    Result Date: 2024  STUDY: Foot Radiographs; 2024 11:40 PM INDICATION: Left foot wound, new.  COMPARISON: None available.  ACCESSION NUMBER(S): [id number] ORDERING CLINICIAN: TIERRA POLLACK TECHNIQUE:  Three view(s) of the left foot. FINDINGS:  There is no displaced fracture.  The alignment is anatomic.  Moderate dorsal soft tissue swelling.  No suspicious osseous erosive lesion identified.    No acute osseous abnormality.  Moderate dorsal soft tissue swelling Signed by Dwight Villa MD            Assessment/Plan   ASSESSMENT & PLAN:    #S/P Left hallux amputation   #Cellulitis, left foot  #Full thickness ulceration, left hallux  #Osteomyelitis, left hallux    - Patient was seen and evaluated; all findings were discussed and all questions were answered to patient's satisfaction.  - Charts, labs, vitals and imaging all reviewed.   - Imagin/18 xray suggest osteomyelitis of the 1st toe,  MRI  osteomyelitis of the 1st distal phalanx and distal aspect of 1st proximal phalanx  - Labs: Blood glucose 117, WBC 11.3 Hemoglobin 10.7,   - Wound culture: No growth    Plan:  - Antibiotic: Continue Primary team recommendations  - Primary closure of amputation site planned for Friday  07/26.  Patient was agreeable to surgical plan  - Surgery scheduled Friday 07/26. NPO aftermidnight.  - Dressings: Vashe-soaked packing in the surgical site, 4x4s, abd pad, webril, and ace bandage   - Nursing staff is able to change/reinforce dressing if & as necessary until next day’s dressing change. Thank you.  - Podiatry will continue to follow while in house.    Patient was evaluated with attending Dr. Crow Herndon DPM    Case to be discussed with attending, A&P above reflects a tentative plan. Please await for the final signature from the attending physician on service.    Aaliyah Moura DPM PGY-1  Podiatric Medicine & Surgery    I saw and evaluated the patient. I personally obtained the key and critical portions of the history and physical exam or was physically present for key and critical portions performed by the resident/fellow. I reviewed the resident/fellow's documentation and discussed the patient with the resident/fellow. I agree with the resident/fellow's medical decision making as documented in the note.    I spent 45 minutes in the professional and overall care of this patient.    Crow Herndon DPM

## 2024-07-26 ENCOUNTER — ANESTHESIA (OUTPATIENT)
Dept: OPERATING ROOM | Facility: HOSPITAL | Age: 69
DRG: 617 | End: 2024-07-26
Payer: COMMERCIAL

## 2024-07-26 ENCOUNTER — ANESTHESIA EVENT (OUTPATIENT)
Dept: OPERATING ROOM | Facility: HOSPITAL | Age: 69
DRG: 617 | End: 2024-07-26
Payer: COMMERCIAL

## 2024-07-26 LAB
BACTERIA SPEC CULT: NORMAL
BACTERIA SPEC CULT: NORMAL
BASOPHILS # BLD AUTO: 0.09 X10*3/UL (ref 0–0.1)
BASOPHILS NFR BLD AUTO: 0.7 %
EOSINOPHIL # BLD AUTO: 0.33 X10*3/UL (ref 0–0.7)
EOSINOPHIL NFR BLD AUTO: 2.6 %
ERYTHROCYTE [DISTWIDTH] IN BLOOD BY AUTOMATED COUNT: 11.7 % (ref 11.5–14.5)
FUNGUS SPEC CULT: NORMAL
FUNGUS SPEC CULT: NORMAL
FUNGUS SPEC FUNGUS STN: NORMAL
FUNGUS SPEC FUNGUS STN: NORMAL
GLUCOSE BLD MANUAL STRIP-MCNC: 108 MG/DL (ref 74–99)
GLUCOSE BLD MANUAL STRIP-MCNC: 210 MG/DL (ref 74–99)
GLUCOSE BLD MANUAL STRIP-MCNC: 95 MG/DL (ref 74–99)
GLUCOSE BLD MANUAL STRIP-MCNC: 97 MG/DL (ref 74–99)
GLUCOSE BLD MANUAL STRIP-MCNC: 98 MG/DL (ref 74–99)
GRAM STN SPEC: NORMAL
HCT VFR BLD AUTO: 33.6 % (ref 36–46)
HGB BLD-MCNC: 10.7 G/DL (ref 12–16)
IMM GRANULOCYTES # BLD AUTO: 0.12 X10*3/UL (ref 0–0.7)
IMM GRANULOCYTES NFR BLD AUTO: 1 % (ref 0–0.9)
LYMPHOCYTES # BLD AUTO: 1.77 X10*3/UL (ref 1.2–4.8)
LYMPHOCYTES NFR BLD AUTO: 14.2 %
MCH RBC QN AUTO: 25.7 PG (ref 26–34)
MCHC RBC AUTO-ENTMCNC: 31.8 G/DL (ref 32–36)
MCV RBC AUTO: 81 FL (ref 80–100)
MONOCYTES # BLD AUTO: 0.91 X10*3/UL (ref 0.1–1)
MONOCYTES NFR BLD AUTO: 7.3 %
NEUTROPHILS # BLD AUTO: 9.28 X10*3/UL (ref 1.2–7.7)
NEUTROPHILS NFR BLD AUTO: 74.2 %
NRBC BLD-RTO: 0 /100 WBCS (ref 0–0)
PLATELET # BLD AUTO: 322 X10*3/UL (ref 150–450)
RBC # BLD AUTO: 4.17 X10*6/UL (ref 4–5.2)
WBC # BLD AUTO: 12.5 X10*3/UL (ref 4.4–11.3)

## 2024-07-26 PROCEDURE — 99232 SBSQ HOSP IP/OBS MODERATE 35: CPT | Performed by: INTERNAL MEDICINE

## 2024-07-26 PROCEDURE — 3700000002 HC GENERAL ANESTHESIA TIME - EACH INCREMENTAL 1 MINUTE: Performed by: PODIATRIST

## 2024-07-26 PROCEDURE — 2500000004 HC RX 250 GENERAL PHARMACY W/ HCPCS (ALT 636 FOR OP/ED)

## 2024-07-26 PROCEDURE — 0QBP0Z2 EXCISION OF LEFT METATARSAL, SESAMOID BONE(S) 1ST TOE, OPEN APPROACH: ICD-10-PCS | Performed by: PODIATRIST

## 2024-07-26 PROCEDURE — 2500000001 HC RX 250 WO HCPCS SELF ADMINISTERED DRUGS (ALT 637 FOR MEDICARE OP): Performed by: INTERNAL MEDICINE

## 2024-07-26 PROCEDURE — 85025 COMPLETE CBC W/AUTO DIFF WBC: CPT

## 2024-07-26 PROCEDURE — 7100000002 HC RECOVERY ROOM TIME - EACH INCREMENTAL 1 MINUTE: Performed by: PODIATRIST

## 2024-07-26 PROCEDURE — 3600000008 HC OR TIME - EACH INCREMENTAL 1 MINUTE - PROCEDURE LEVEL THREE: Performed by: PODIATRIST

## 2024-07-26 PROCEDURE — 3700000001 HC GENERAL ANESTHESIA TIME - INITIAL BASE CHARGE: Performed by: PODIATRIST

## 2024-07-26 PROCEDURE — 87070 CULTURE OTHR SPECIMN AEROBIC: CPT | Performed by: INTERNAL MEDICINE

## 2024-07-26 PROCEDURE — 97530 THERAPEUTIC ACTIVITIES: CPT | Mod: GP

## 2024-07-26 PROCEDURE — 2500000001 HC RX 250 WO HCPCS SELF ADMINISTERED DRUGS (ALT 637 FOR MEDICARE OP)

## 2024-07-26 PROCEDURE — 36415 COLL VENOUS BLD VENIPUNCTURE: CPT

## 2024-07-26 PROCEDURE — 88307 TISSUE EXAM BY PATHOLOGIST: CPT | Mod: TC,SUR,WESLAB | Performed by: INTERNAL MEDICINE

## 2024-07-26 PROCEDURE — 87102 FUNGUS ISOLATION CULTURE: CPT | Performed by: INTERNAL MEDICINE

## 2024-07-26 PROCEDURE — 1100000001 HC PRIVATE ROOM DAILY

## 2024-07-26 PROCEDURE — 2720000007 HC OR 272 NO HCPCS: Performed by: PODIATRIST

## 2024-07-26 PROCEDURE — 2500000004 HC RX 250 GENERAL PHARMACY W/ HCPCS (ALT 636 FOR OP/ED): Performed by: PODIATRIST

## 2024-07-26 PROCEDURE — 97161 PT EVAL LOW COMPLEX 20 MIN: CPT | Mod: GP

## 2024-07-26 PROCEDURE — 3600000003 HC OR TIME - INITIAL BASE CHARGE - PROCEDURE LEVEL THREE: Performed by: PODIATRIST

## 2024-07-26 PROCEDURE — 01NG0ZZ RELEASE TIBIAL NERVE, OPEN APPROACH: ICD-10-PCS | Performed by: PODIATRIST

## 2024-07-26 PROCEDURE — 82947 ASSAY GLUCOSE BLOOD QUANT: CPT

## 2024-07-26 PROCEDURE — 7100000001 HC RECOVERY ROOM TIME - INITIAL BASE CHARGE: Performed by: PODIATRIST

## 2024-07-26 PROCEDURE — A11044 PR DEBRIDEMENT, SKIN, SUB-Q TISSUE,MUSCLE,BONE,=<20 SQ CM: Performed by: ANESTHESIOLOGY

## 2024-07-26 RX ORDER — HYDROMORPHONE HYDROCHLORIDE 1 MG/ML
0.2 INJECTION, SOLUTION INTRAMUSCULAR; INTRAVENOUS; SUBCUTANEOUS EVERY 5 MIN PRN
Status: CANCELLED | OUTPATIENT
Start: 2024-07-26

## 2024-07-26 RX ORDER — OXYCODONE HYDROCHLORIDE 5 MG/1
10 TABLET ORAL EVERY 4 HOURS PRN
Status: CANCELLED | OUTPATIENT
Start: 2024-07-26

## 2024-07-26 RX ORDER — OXYCODONE HYDROCHLORIDE 5 MG/1
5 TABLET ORAL EVERY 4 HOURS PRN
Status: CANCELLED | OUTPATIENT
Start: 2024-07-26

## 2024-07-26 RX ORDER — MIDAZOLAM HYDROCHLORIDE 1 MG/ML
INJECTION INTRAMUSCULAR; INTRAVENOUS AS NEEDED
Status: DISCONTINUED | OUTPATIENT
Start: 2024-07-26 | End: 2024-07-26

## 2024-07-26 RX ORDER — LABETALOL HYDROCHLORIDE 5 MG/ML
5 INJECTION, SOLUTION INTRAVENOUS ONCE AS NEEDED
Status: CANCELLED | OUTPATIENT
Start: 2024-07-26

## 2024-07-26 RX ORDER — PROPOFOL 10 MG/ML
INJECTION, EMULSION INTRAVENOUS AS NEEDED
Status: DISCONTINUED | OUTPATIENT
Start: 2024-07-26 | End: 2024-07-26

## 2024-07-26 RX ORDER — BUPIVACAINE HYDROCHLORIDE 5 MG/ML
INJECTION, SOLUTION PERINEURAL AS NEEDED
Status: DISCONTINUED | OUTPATIENT
Start: 2024-07-26 | End: 2024-07-26 | Stop reason: HOSPADM

## 2024-07-26 RX ORDER — HYDROMORPHONE HYDROCHLORIDE 1 MG/ML
0.4 INJECTION, SOLUTION INTRAMUSCULAR; INTRAVENOUS; SUBCUTANEOUS EVERY 5 MIN PRN
Status: CANCELLED | OUTPATIENT
Start: 2024-07-26

## 2024-07-26 RX ORDER — HYDRALAZINE HYDROCHLORIDE 20 MG/ML
5 INJECTION INTRAMUSCULAR; INTRAVENOUS EVERY 30 MIN PRN
Status: CANCELLED | OUTPATIENT
Start: 2024-07-26

## 2024-07-26 RX ORDER — ONDANSETRON HYDROCHLORIDE 2 MG/ML
4 INJECTION, SOLUTION INTRAVENOUS ONCE AS NEEDED
Status: CANCELLED | OUTPATIENT
Start: 2024-07-26

## 2024-07-26 RX ORDER — LIDOCAINE HYDROCHLORIDE 10 MG/ML
0.1 INJECTION, SOLUTION EPIDURAL; INFILTRATION; INTRACAUDAL; PERINEURAL ONCE
Status: CANCELLED | OUTPATIENT
Start: 2024-07-26 | End: 2024-07-26

## 2024-07-26 RX ORDER — ALBUTEROL SULFATE 0.83 MG/ML
2.5 SOLUTION RESPIRATORY (INHALATION) ONCE AS NEEDED
Status: CANCELLED | OUTPATIENT
Start: 2024-07-26

## 2024-07-26 RX ORDER — FENTANYL CITRATE 50 UG/ML
INJECTION, SOLUTION INTRAMUSCULAR; INTRAVENOUS AS NEEDED
Status: DISCONTINUED | OUTPATIENT
Start: 2024-07-26 | End: 2024-07-26

## 2024-07-26 RX ORDER — ONDANSETRON HYDROCHLORIDE 2 MG/ML
INJECTION, SOLUTION INTRAVENOUS AS NEEDED
Status: DISCONTINUED | OUTPATIENT
Start: 2024-07-26 | End: 2024-07-26

## 2024-07-26 RX ORDER — SODIUM CHLORIDE, SODIUM LACTATE, POTASSIUM CHLORIDE, CALCIUM CHLORIDE 600; 310; 30; 20 MG/100ML; MG/100ML; MG/100ML; MG/100ML
100 INJECTION, SOLUTION INTRAVENOUS CONTINUOUS
Status: CANCELLED | OUTPATIENT
Start: 2024-07-26

## 2024-07-26 ASSESSMENT — COLUMBIA-SUICIDE SEVERITY RATING SCALE - C-SSRS
6. HAVE YOU EVER DONE ANYTHING, STARTED TO DO ANYTHING, OR PREPARED TO DO ANYTHING TO END YOUR LIFE?: NO
1. IN THE PAST MONTH, HAVE YOU WISHED YOU WERE DEAD OR WISHED YOU COULD GO TO SLEEP AND NOT WAKE UP?: NO
2. HAVE YOU ACTUALLY HAD ANY THOUGHTS OF KILLING YOURSELF?: NO

## 2024-07-26 ASSESSMENT — COGNITIVE AND FUNCTIONAL STATUS - GENERAL
MOBILITY SCORE: 13
MOVING FROM LYING ON BACK TO SITTING ON SIDE OF FLAT BED WITH BEDRAILS: A LITTLE
MOVING TO AND FROM BED TO CHAIR: A LOT
MOVING FROM LYING ON BACK TO SITTING ON SIDE OF FLAT BED WITH BEDRAILS: A LITTLE
DAILY ACTIVITIY SCORE: 23
CLIMB 3 TO 5 STEPS WITH RAILING: TOTAL
WALKING IN HOSPITAL ROOM: A LOT
DRESSING REGULAR LOWER BODY CLOTHING: A LITTLE
CLIMB 3 TO 5 STEPS WITH RAILING: TOTAL
MOBILITY SCORE: 13
STANDING UP FROM CHAIR USING ARMS: A LOT
TURNING FROM BACK TO SIDE WHILE IN FLAT BAD: A LITTLE
STANDING UP FROM CHAIR USING ARMS: A LOT
MOVING TO AND FROM BED TO CHAIR: A LOT
WALKING IN HOSPITAL ROOM: A LOT
TURNING FROM BACK TO SIDE WHILE IN FLAT BAD: A LITTLE

## 2024-07-26 ASSESSMENT — PAIN - FUNCTIONAL ASSESSMENT
PAIN_FUNCTIONAL_ASSESSMENT: 0-10

## 2024-07-26 ASSESSMENT — PAIN SCALES - GENERAL
PAINLEVEL_OUTOF10: 0 - NO PAIN
PAINLEVEL_OUTOF10: 7
PAINLEVEL_OUTOF10: 8
PAINLEVEL_OUTOF10: 10 - WORST POSSIBLE PAIN
PAIN_LEVEL: 0
PAINLEVEL_OUTOF10: 3
PAINLEVEL_OUTOF10: 0 - NO PAIN
PAINLEVEL_OUTOF10: 5 - MODERATE PAIN
PAINLEVEL_OUTOF10: 4
PAINLEVEL_OUTOF10: 3

## 2024-07-26 ASSESSMENT — ACTIVITIES OF DAILY LIVING (ADL): ADL_ASSISTANCE: INDEPENDENT

## 2024-07-26 NOTE — CARE PLAN
Patient is calm and cooperative with care, Pt NPO at midnight for surgical procedure tomorrow.     Problem: Pain  Goal: Takes deep breaths with improved pain control throughout the shift  Outcome: Progressing  Goal: Turns in bed with improved pain control throughout the shift  Outcome: Progressing  Goal: Walks with improved pain control throughout the shift  Outcome: Progressing   The patient's goals for the shift include Patient willreceived IV antibiotics as ordered this shift    The clinical goals for the shift include Patient will be free from falls or injuries throughout the shift    Over the shift, the patient did not make progress toward the following goals.

## 2024-07-26 NOTE — OP NOTE
PODIATRIC OPERATIVE REPORT    LOG ID: [id number]  SURGERY/PROCEDURE DATE: 7/26/2024  OR LOCATION: [facility] OR    SURGEON(S)/PROCEDURALIST(S) AND ASSISTANT(S):  Primary: Crow Herndon DPM  Resident - Assisting: Barry Beavers DPM  Resident- Assisting: Aaliyah Moura DPM    OTHER OR STAFF:  Circulator: Silvina Gates RN  Relief Circulator: Anna Smith RN  Scrub Person: Zechariah Greenwood    SURGERY/PROCEDURE(S):  Debridement Lower Extremity  75437 - AK DEBRIDEMENT BONE 1ST 20 SQ CM/<      PRE-OP/PRE-PROCEDURE DIAGNOSIS:   Pre-op Diagnosis      * Other osteomyelitis of left foot (Multi) [M86.8X7]    POST-OP/POST-PROCEDURE DIAGNOSIS: Same as Pre-Op    ANESTHESIA:   Anesthesia: Consult  ASA: III  Anesthesia Staff: Anesthesiologist: Sandor Paul MD  Anesthesia Resident: Vish Gaxiola MD  Intra-op Medications: Administrations occurring from 1213 to 1341 on 07/26/24:  * No intraprocedure medications in log *    ESTIMATED BLOOD LOSS: less than 50 mL    SPECIMENS:   Order Name Source Comment Collection Info Order Time   FUNGAL CULTURE/SMEAR SOFT TISSUE RESECTION Pre-op diagnosis:  Other osteomyelitis of left foot (Multi) [M86.8X7] Collected By: Crow Herndon DPM 7/26/2024  3:02 PM     [facility]   Immediate        TISSUE/WOUND CULTURE/SMEAR SOFT TISSUE RESECTION Pre-op diagnosis:  Other osteomyelitis of left foot (Multi) [M86.8X7] Collected By: Crow Herndon DPM 7/26/2024  3:02 PM     Release result to [facility]   Immediate        SURGICAL PATHOLOGY EXAM SOFT TISSUE RESECTION Pre-op diagnosis:  Other osteomyelitis of left foot (Multi) [M86.8X7] Collected By: Crow Herndon DPM 7/26/2024  3:02 PM       IMPLANTABLE DEVICES:  Nothing was implanted during the procedure    FINDINGS: Intraoperative findings consistent with clinical and radiographic findings.    DRAINS: none    TOURNIQUET TIMES:   NA    COMPLICATIONS: None; patient tolerated the procedure well.       SURGERY/PROCEDURE  DETAILS:  INDICATIONS FOR PROCEDURE:   The patient with diagnosis as outlined above presents for podiatric surgical intervention today. The patient has attempted and failed conservative treatment as outlined in preoperative clinic notes and wishes to proceed with surgical intervention at this time. The nature of the deformity, problems anticipated procedures, recovery/convalescence, risks/complications including but not limited to numbness, CRPS, over/under correction, problems healing of soft tissue or bone, postoperative wound infection, wound dehiscence, DVT and/or persistent pain/disability have been explained to the patient in detail. An updated H&P and consent have been completed prior to today’s surgical intervention. The patient states that they have been NPO since midnight. No guarantees were given or implied, but it is expected that the patient will have a favorable outcome.  It is with this understanding that we proceed.     PRE-PROCEDURE INFORMATION:  In pre-op holding area, the Left medial foot at previous amputation site extremity to be operated on was clearly marked and the patient verified correct laterality of the marking.  The patient was brought to the operating room and placed on the operating table in the Supine position.  A timeout was performed in which identification of the correct patient, procedure, location, and materials was done. Following IV sedation, local anesthesia was obtained utilizing 10cc of 1:1 1% lidocaine plain and 0.5% bupivacaine plain . The Left foot was then scrubbed, prepped and draped in the usual aseptic manner.     DESCRIPTION OF PROCEDURE:   Attention was directed to the Left medial foot near the Metatarsophalangeal joint. Using #15 blade and other instrumentation the medial and lateral sesamoids where carefully dissected out avoiding all nearby neurovascular structures.  Once released from all soft tissue attachments the medial and lateral semisolids where removed  from the surgical field. Using a #15 blade the plantar nerve was decompressed from scar tissue surrounding nurse bundle at distal to the 1st metatarsal head. An internal and external neurolysis was preformed on the plantar nerve. Following a rongeur, #15 blade and other instrumentation, devitalized soft tissue was removed from the area. Using Misonix ultrasonic debridement, the wound was further debrided and thoroughly irrigated and closed in layers with the 2-0 nylon and Surgical staples.     A dry, sterile dressing was applied consisting of Betadine-soaked Adaptic, 4x4 gauze, abdominal padding, and a Ace wrap with compression was applied.       POSTOPERATIVE INFORMATION: The patient tolerated the above noted procedure and anesthesia well and was transferred to the PACU with vital signs stable, and vascular status intact with capillary refill intact to the most distal aspect of the operative extremity. Postoperative instructions reviewed in detail with the patient and family with written instructions provided. Patient will return to floor. Should any problems, questions, or concerns arise, primary team to esthela or [facility] podiatry team.    This is Aaliyah Moura DPM dictating the operative note for Dr. Crow Herndon DPM.      Attending Attestation:       SIGNATURE: Aaliyah Moura DPM PATIENT NAME: Ramya Muller   DATE: July 26, 2024 MRN: [id number]   TIME: 5:56 PM      I was present for the entirety of the procedure(s).     Crow Herndon DPM

## 2024-07-26 NOTE — SIGNIFICANT EVENT
Patient underwent Left foot incision and drainage with podiatry today. Surgical site is closed      -Okay to resume all previous medications and to resume previous diet     -Pain meds, antibiotics, and DVT prophylaxis per primary team     -Dressing consists of Betadine soaked adaptic, 4x4s, abd pad, webril, and ace bandage.     -Nursing staff may change or reinforce dressing as needed     -Weightbearing: WBAT in surgical shoe     Aaliyah Moura DPM  PGY-1  Podiatric Medicine & Surgery    Pager# [phone] or [facility]

## 2024-07-26 NOTE — ANESTHESIA POSTPROCEDURE EVALUATION
Patient: Ramya Muller    Procedure Summary       Date: 07/26/24 Room / Location: [facility] OR 04 / [facility]    Anesthesia Start: 1401 Anesthesia Stop: 1515    Procedure: Debridement Lower Extremity (Left) Diagnosis:       Other osteomyelitis of left foot (Multi)      (Other osteomyelitis of left foot (Multi) [M86.8X7])    Surgeons: Crow Herndon DPM Responsible Provider: Sandor Paul MD    Anesthesia Type: MAC ASA Status: 3            Anesthesia Type: MAC    Vitals Value Taken Time   /70 07/26/24 1515   Temp 36.2 07/26/24 1515   Pulse 74 07/26/24 1515   Resp 12 07/26/24 1515   SpO2 100 07/26/24 1515       Anesthesia Post Evaluation    Patient location during evaluation: PACU  Patient participation: complete - patient cannot participate  Level of consciousness: awake and alert  Pain score: 0  Pain management: satisfactory to patient  Multimodal analgesia pain management approach  Airway patency: patent  Two or more strategies used to mitigate risk of obstructive sleep apnea  Cardiovascular status: acceptable and blood pressure returned to baseline  Respiratory status: acceptable  Hydration status: acceptable  Postoperative Nausea and Vomiting: none        No notable events documented.

## 2024-07-26 NOTE — PROGRESS NOTES
"  MEDICINE INPATIENT PROGRESS NOTE      Ramya Muller is a 69 y.o. female on day 8 of admission presenting with Diabetic ulcer of toe of left foot associated with diabetes mellitus of other type, unspecified ulcer stage (Multi).    Subjective   Patient's pain was better controlled yesterday afternoon and overnight. No other complaints.       Objective     Last Recorded Vitals  Blood pressure 146/75, pulse 82, temperature 36.4 °C (97.5 °F), temperature source Temporal, resp. rate 18, height 1.57 m (5' 1.81\"), weight 88.5 kg (195 lb 1.7 oz), SpO2 97%.  Intake/Output last 3 Shifts:  No intake/output data recorded.      Physical Exam  Constitutional:       General: She is not in acute distress.     Appearance: Normal appearance.   HENT:      Head: Normocephalic and atraumatic.      Mouth/Throat:      Mouth: Mucous membranes are moist.   Eyes:      Extraocular Movements: Extraocular movements intact.   Cardiovascular:      Rate and Rhythm: Normal rate and regular rhythm.      Pulses: Normal pulses.      Heart sounds: Normal heart sounds.   Pulmonary:      Effort: Pulmonary effort is normal.      Breath sounds: Normal breath sounds.   Abdominal:      General: Abdomen is flat.      Palpations: Abdomen is soft.   Musculoskeletal:      Right lower leg: No edema.      Left lower leg: No edema.      Comments: L foot in dressing from OR   Skin:     General: Skin is warm and dry.   Neurological:      Mental Status: She is alert and oriented to person, place, and time.         Relevant Results    Labs    Results from last 7 days   Lab Units 07/26/24  0800 07/25/24  0759 07/24/24  1452   WBC AUTO x10*3/uL 12.5* 11.3 9.9   HEMOGLOBIN g/dL 10.7* 10.7* 11.4*   HEMATOCRIT % 33.6* 33.8* 35.5*   PLATELETS AUTO x10*3/uL 322 297 339            Results from last 7 days   Lab Units 07/23/24  0949 07/20/24  0915   SODIUM mmol/L 132* 133*   POTASSIUM mmol/L 3.6 3.7   CO2 mmol/L 28 25   ANION GAP mmol/L 14 14   BUN mg/dL 14 14   CREATININE " "mg/dL 0.64 0.59   GLUCOSE mg/dL 213* 166*   EGFR mL/min/1.73m*2 >90 >90   MAGNESIUM mg/dL 2.06 2.20   PHOSPHORUS mg/dL 3.7 3.2            No lab exists for component: \"BILIT\"     Results from last 7 days   Lab Units 07/24/24  1452   INR  1.1                 Medications  Scheduled medications  [Transfer Hold] acetaminophen, 975 mg, oral, q8h  [Transfer Hold] amoxicillin-pot clavulanate, 1 tablet, oral, q12h DAXA  [Transfer Hold] doxycycline, 100 mg, oral, q12h DAXA  [Transfer Hold] DULoxetine, 30 mg, oral, Daily  [Held by provider] enoxaparin, 40 mg, subcutaneous, q24h  [Transfer Hold] gabapentin, 300 mg, oral, TID  [Transfer Hold] insulin lispro, 0-5 Units, subcutaneous, TID  [Transfer Hold] polyethylene glycol, 17 g, oral, Daily  [Transfer Hold] triamterene-hydrochlorothiazid, 1 tablet, oral, Daily         PRN medications  PRN medications: [Transfer Hold] dextrose, [Transfer Hold] dextrose, [Transfer Hold] eucerin, [Transfer Hold] glucagon, [Transfer Hold] glucagon, [Transfer Hold] HYDROmorphone, [Transfer Hold] oxyCODONE, [Transfer Hold] traMADol     Imaging  Lower extremity venous duplex bilateral    Result Date: 7/19/2024  No sonographic evidence for deep vein thrombosis within the evaluated veins of the bilateral lower extremities.   I personally reviewed the images/study and I agree with the findings as stated by Margarita Ng MD. This study was interpreted at [facility], [place], [place].   MACRO: None   Signed by: Giancarlo Castorena 7/19/2024 3:46 PM Dictation workstation:   [id number]    MR foot left wo IV contrast    Result Date: 7/19/2024  1. 1st interphalangeal region soft tissue ulcer with subjacent osteomyelitis of the 1st distal phalanx and distal aspect of 1st proximal phalanx. There is an additional medial fluid collection suspected with internal soft tissue gas which may reflect a tiny superficial abscess. 2. Increased T2 signal throughout the subcutaneous soft " tissues of the visualized lower extremity, consistent with generalized edema and/or cellulitis. 3. 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   I personally reviewed the images/study and I agree with the findings as stated. This study was interpreted at [place], [place].   MACRO: None   Signed by: Turner Salcedo 7/19/2024 9:19 AM Dictation workstation:   [id number]    XR foot left 3+ views    Result Date: 7/18/2024  1. Findings most compatible with osteomyelitis of the 1st toe with overlying wound/ulcer on the medial side of the toe. 2. Findings which may represent osteomyelitis of the head of the proximal phalanx of the 1st digit. 3. Soft tissue gas in the 1st toe for which differential considerations include sequelae of open nature of process gas-forming infection and/or any recent intervention. 4. MRI is recommended for further assessment.     I personally reviewed the images/study and I agree with the findings as stated. This study was interpreted at [place], [place].   MACRO: None   Signed by: Smooth Espinoza 7/18/2024 1:16 PM Dictation workstation:   [id number]    XR foot left 3+ views    Result Date: 7/7/2024  No acute osseous abnormality.  Moderate dorsal soft tissue swelling Signed by Dwight Villa MD          Assessment/Plan     Ramya Muller is a 69 y.o. female with poorly controlled T2DM (A1c 8%, 4/15/2024) initially treated for a diabetic foot soft tissue infection of the L 1st digit now presenting with worsening pain and with nonhealing ulcer that probes to bone with MRI L foot confirming osteomyelitis of L great toe with involvement of distal metatarsal w/o fluid collection (septic arthritis w/o drainable pocket). Underwent L hallux amputation with podiatry on 7/24/24.       Updates 7/26  -closure of L hallux amputation with podiatry today  -augmentin and doxycycline for 48  hours (finish 7/27/24 morning)  -PVR unremarkable      #L 1st digit diabetic ulcer  #Osteomyelitis  #Septic arthritis w/o drainable pocket  PLAN  -blood cxs 7/19: NGTD  -Pain control: duloxetine 30 mg daily (home med); tylenol 975mg q8h scheduled, oxy 2.5mg q4h prn, oxy 5mg q6h prn, dialudid 0.4 mg q4h prn, tramadol 50mg q6h prn  -closure of L hallux amputation with podiatry today  -augmentin and doxycycline for 48 hours (finish tomorrow morning)  -PVR unremarkable     #T2DM c/b neuropathy  A1c 8.6%; BG WNL  PLAN  -SSI #1 for now, requiring 0-2 units per day  -hypoglycemia protocol  -Endo and PCP OP follow up on discharge  -Initiate antihyperglycemic agent on discharge: glipizide 5mg daily, patient not interested in GLP1 at this time  -ASCVD risk at least 13.3%; patient has reported anaphylaxis to statins  -Lipid panel ordered;      #Fibromyalgia and diabetic neuropathy  -C/w home Duloxetine 30 mg daily  -gabapentin 300mg TID     #AFIB, resolved  -Chart diagnosis. Patient is unaware of this diagnosis. No record of rate/rhythm control meds. EKG normal, no palpitations      #Lower leg cramps  -Iron profile c/w ACD; consider iron repletion for RLS tx when infection resolves  -DVT US BLE 7/19 negative    #ACD  -Iron studies consistent with anemia of chronic disease (iron low, transferrin WNL; ferritin elevated)  PLAN  -See above for diabetic OM     F: prn  E: prn  N: regular diet after OR  A: PIV  DVT: lovenox  GI: not indicated     Bowel Regimen: miralax azeem daily  Code Status: Full code (confirmed on admission)  NOK: Jennifer (daughter): [phone]      Jimbo Wynn MD

## 2024-07-26 NOTE — ANESTHESIA PREPROCEDURE EVALUATION
Patient: Ramya Muller    Procedure Information       Date/Time: 07/26/24 1213    Procedure: Debridement Lower Extremity (Left) - Need Misonix    Location: [facility] OR 04 / [facility] OR    Surgeons: Crow Herndon DPM            Relevant Problems   Cardiac   (+) Type 2 diabetes mellitus with diabetic peripheral angiopathy without gangrene, without long-term current use of insulin (Multi)      Endocrine   (+) Diabetic neuropathy (Multi)   (+) Obesity   (+) Type 2 diabetes mellitus with diabetic peripheral angiopathy without gangrene, without long-term current use of insulin (Multi)      Musculoskeletal   (+) Fibromyalgia      ID   (+) Pyogenic inflammation of bone (Multi)       Clinical information reviewed:   Tobacco  Allergies  Meds   Med Hx  Surg Hx  OB Status  Fam Hx  Soc   Hx        NPO Detail:  NPO/Void Status  Date of Last Liquid: 07/26/24  Time of Last Liquid: 0000  Date of Last Solid: 07/26/24  Time of Last Solid: 0000  Last Intake Type: Clear fluids         Physical Exam    Airway  Mallampati: I  TM distance: >3 FB     Cardiovascular    Dental    Pulmonary    Abdominal        Anesthesia Plan    History of general anesthesia?: yes  History of complications of general anesthesia?: no    ASA 3     MAC     Anesthetic plan and risks discussed with patient.    Long Hx untreated type 2 DM, afib w/o anticoag Rx. Plan MAC.  Procedure explained.  Risks Discussed. All questions answered.  She agrees to proceed.

## 2024-07-26 NOTE — PROGRESS NOTES
TCC met with pt to discuss care team reccs for MOD intensity. Pt states she wants to talk to her dtr before making a decision. TCC provided SNF list to pt to review and will follow up with pt at a later time. Medical team aware.

## 2024-07-27 ENCOUNTER — DOCUMENTATION (OUTPATIENT)
Dept: HOME HEALTH SERVICES | Facility: HOME HEALTH | Age: 69
End: 2024-07-27
Payer: COMMERCIAL

## 2024-07-27 LAB
ALBUMIN SERPL BCP-MCNC: 3.6 G/DL (ref 3.4–5)
ANION GAP SERPL CALC-SCNC: 13 MMOL/L (ref 10–20)
BASOPHILS # BLD AUTO: 0.09 X10*3/UL (ref 0–0.1)
BASOPHILS NFR BLD AUTO: 0.6 %
BUN SERPL-MCNC: 15 MG/DL (ref 6–23)
CALCIUM SERPL-MCNC: 9.5 MG/DL (ref 8.6–10.6)
CHLORIDE SERPL-SCNC: 97 MMOL/L (ref 98–107)
CO2 SERPL-SCNC: 30 MMOL/L (ref 21–32)
CREAT SERPL-MCNC: 0.76 MG/DL (ref 0.5–1.05)
EGFRCR SERPLBLD CKD-EPI 2021: 86 ML/MIN/1.73M*2
EOSINOPHIL # BLD AUTO: 0.29 X10*3/UL (ref 0–0.7)
EOSINOPHIL NFR BLD AUTO: 1.9 %
ERYTHROCYTE [DISTWIDTH] IN BLOOD BY AUTOMATED COUNT: 11.9 % (ref 11.5–14.5)
GLUCOSE BLD MANUAL STRIP-MCNC: 137 MG/DL (ref 74–99)
GLUCOSE BLD MANUAL STRIP-MCNC: 151 MG/DL (ref 74–99)
GLUCOSE BLD MANUAL STRIP-MCNC: 153 MG/DL (ref 74–99)
GLUCOSE BLD MANUAL STRIP-MCNC: 155 MG/DL (ref 74–99)
GLUCOSE SERPL-MCNC: 121 MG/DL (ref 74–99)
HCT VFR BLD AUTO: 34.2 % (ref 36–46)
HGB BLD-MCNC: 11 G/DL (ref 12–16)
IMM GRANULOCYTES # BLD AUTO: 0.15 X10*3/UL (ref 0–0.7)
IMM GRANULOCYTES NFR BLD AUTO: 1 % (ref 0–0.9)
LYMPHOCYTES # BLD AUTO: 1.53 X10*3/UL (ref 1.2–4.8)
LYMPHOCYTES NFR BLD AUTO: 9.8 %
MAGNESIUM SERPL-MCNC: 1.95 MG/DL (ref 1.6–2.4)
MCH RBC QN AUTO: 26.1 PG (ref 26–34)
MCHC RBC AUTO-ENTMCNC: 32.2 G/DL (ref 32–36)
MCV RBC AUTO: 81 FL (ref 80–100)
MONOCYTES # BLD AUTO: 1.09 X10*3/UL (ref 0.1–1)
MONOCYTES NFR BLD AUTO: 7 %
NEUTROPHILS # BLD AUTO: 12.41 X10*3/UL (ref 1.2–7.7)
NEUTROPHILS NFR BLD AUTO: 79.7 %
NRBC BLD-RTO: 0 /100 WBCS (ref 0–0)
PHOSPHATE SERPL-MCNC: 3.6 MG/DL (ref 2.5–4.9)
PLATELET # BLD AUTO: 293 X10*3/UL (ref 150–450)
POTASSIUM SERPL-SCNC: 3.9 MMOL/L (ref 3.5–5.3)
RBC # BLD AUTO: 4.21 X10*6/UL (ref 4–5.2)
SODIUM SERPL-SCNC: 136 MMOL/L (ref 136–145)
WBC # BLD AUTO: 15.6 X10*3/UL (ref 4.4–11.3)

## 2024-07-27 PROCEDURE — 2500000004 HC RX 250 GENERAL PHARMACY W/ HCPCS (ALT 636 FOR OP/ED)

## 2024-07-27 PROCEDURE — 82947 ASSAY GLUCOSE BLOOD QUANT: CPT

## 2024-07-27 PROCEDURE — 2500000001 HC RX 250 WO HCPCS SELF ADMINISTERED DRUGS (ALT 637 FOR MEDICARE OP)

## 2024-07-27 PROCEDURE — 36415 COLL VENOUS BLD VENIPUNCTURE: CPT

## 2024-07-27 PROCEDURE — 97530 THERAPEUTIC ACTIVITIES: CPT | Mod: GP

## 2024-07-27 PROCEDURE — 1100000001 HC PRIVATE ROOM DAILY

## 2024-07-27 PROCEDURE — 83735 ASSAY OF MAGNESIUM: CPT

## 2024-07-27 PROCEDURE — 99232 SBSQ HOSP IP/OBS MODERATE 35: CPT

## 2024-07-27 PROCEDURE — 80069 RENAL FUNCTION PANEL: CPT

## 2024-07-27 PROCEDURE — 2500000001 HC RX 250 WO HCPCS SELF ADMINISTERED DRUGS (ALT 637 FOR MEDICARE OP): Performed by: INTERNAL MEDICINE

## 2024-07-27 PROCEDURE — 97116 GAIT TRAINING THERAPY: CPT | Mod: GP

## 2024-07-27 PROCEDURE — 86140 C-REACTIVE PROTEIN: CPT

## 2024-07-27 PROCEDURE — 85025 COMPLETE CBC W/AUTO DIFF WBC: CPT | Performed by: INTERNAL MEDICINE

## 2024-07-27 RX ORDER — OXYCODONE HYDROCHLORIDE 5 MG/1
5 TABLET ORAL EVERY 6 HOURS PRN
Start: 2024-07-27 | End: 2024-07-29

## 2024-07-27 RX ORDER — OXYCODONE HYDROCHLORIDE 5 MG/1
5 TABLET ORAL EVERY 4 HOURS PRN
Status: DISCONTINUED | OUTPATIENT
Start: 2024-07-27 | End: 2024-07-30 | Stop reason: HOSPADM

## 2024-07-27 RX ORDER — OXYCODONE HYDROCHLORIDE 5 MG/1
5 TABLET ORAL EVERY 4 HOURS PRN
Status: DISCONTINUED | OUTPATIENT
Start: 2024-07-27 | End: 2024-07-27

## 2024-07-27 RX ORDER — POLYETHYLENE GLYCOL 3350 17 G/17G
17 POWDER, FOR SOLUTION ORAL DAILY
Start: 2024-07-27

## 2024-07-27 RX ORDER — GLIPIZIDE 5 MG/1
5 TABLET ORAL DAILY
Start: 2024-07-27 | End: 2024-10-25

## 2024-07-27 RX ORDER — GABAPENTIN 300 MG/1
300 CAPSULE ORAL 3 TIMES DAILY
Start: 2024-07-27 | End: 2024-08-26

## 2024-07-27 RX ORDER — ACETAMINOPHEN 325 MG/1
975 TABLET ORAL EVERY 8 HOURS PRN
Start: 2024-07-27 | End: 2024-08-03

## 2024-07-27 ASSESSMENT — PAIN - FUNCTIONAL ASSESSMENT
PAIN_FUNCTIONAL_ASSESSMENT: 0-10
PAIN_FUNCTIONAL_ASSESSMENT: 0-10
PAIN_FUNCTIONAL_ASSESSMENT: WONG-BAKER FACES
PAIN_FUNCTIONAL_ASSESSMENT: 0-10

## 2024-07-27 ASSESSMENT — COGNITIVE AND FUNCTIONAL STATUS - GENERAL
DAILY ACTIVITIY SCORE: 19
TOILETING: A LITTLE
WALKING IN HOSPITAL ROOM: A LOT
PERSONAL GROOMING: A LITTLE
MOVING FROM LYING ON BACK TO SITTING ON SIDE OF FLAT BED WITH BEDRAILS: A LITTLE
HELP NEEDED FOR BATHING: A LITTLE
HELP NEEDED FOR BATHING: A LITTLE
MOVING TO AND FROM BED TO CHAIR: A LITTLE
TURNING FROM BACK TO SIDE WHILE IN FLAT BAD: A LITTLE
TOILETING: A LITTLE
DRESSING REGULAR UPPER BODY CLOTHING: A LITTLE
DRESSING REGULAR UPPER BODY CLOTHING: A LITTLE
PERSONAL GROOMING: A LITTLE
MOVING FROM LYING ON BACK TO SITTING ON SIDE OF FLAT BED WITH BEDRAILS: A LITTLE
DAILY ACTIVITIY SCORE: 19
CLIMB 3 TO 5 STEPS WITH RAILING: TOTAL
DRESSING REGULAR LOWER BODY CLOTHING: A LITTLE
STANDING UP FROM CHAIR USING ARMS: A LITTLE
CLIMB 3 TO 5 STEPS WITH RAILING: A LOT
TURNING FROM BACK TO SIDE WHILE IN FLAT BAD: A LITTLE
MOBILITY SCORE: 15
WALKING IN HOSPITAL ROOM: A LOT
MOBILITY SCORE: 15
MOBILITY SCORE: 19
MOVING TO AND FROM BED TO CHAIR: A LITTLE
MOVING TO AND FROM BED TO CHAIR: A LITTLE
DRESSING REGULAR LOWER BODY CLOTHING: A LITTLE
TURNING FROM BACK TO SIDE WHILE IN FLAT BAD: A LITTLE
CLIMB 3 TO 5 STEPS WITH RAILING: TOTAL
STANDING UP FROM CHAIR USING ARMS: A LITTLE
WALKING IN HOSPITAL ROOM: A LITTLE

## 2024-07-27 ASSESSMENT — PAIN SCALES - GENERAL
PAINLEVEL_OUTOF10: 6
PAINLEVEL_OUTOF10: 0 - NO PAIN
PAINLEVEL_OUTOF10: 0 - NO PAIN
PAINLEVEL_OUTOF10: 5 - MODERATE PAIN
PAINLEVEL_OUTOF10: 6
PAINLEVEL_OUTOF10: 8
PAINLEVEL_OUTOF10: 4
PAINLEVEL_OUTOF10: 6

## 2024-07-27 NOTE — PROGRESS NOTES
"  MEDICINE INPATIENT PROGRESS NOTE      Ramya Muller is a 69 y.o. female on day 9 of admission presenting with Diabetic ulcer of toe of left foot associated with diabetes mellitus of other type, unspecified ulcer stage (Multi).    Subjective   Patient was in the bathroom and lost her footing but was able to catch herself before falling. She was helped up by staff and put into the bed with a bed alarm. She had some pain overnight and this morning but it was relieved by oxycodone.       Objective     Last Recorded Vitals  Blood pressure 139/77, pulse 99, temperature 37 °C (98.6 °F), temperature source Skin, resp. rate 17, height 1.57 m (5' 1.81\"), weight 88.5 kg (195 lb 1.7 oz), SpO2 96%.  Intake/Output last 3 Shifts:  I/O last 3 completed shifts:  In: 435 (4.9 mL/kg) [P.O.:60; I.V.:75 (0.8 mL/kg); IV Piggyback:300]  Out: 250 (2.8 mL/kg) [Urine:250 (0.1 mL/kg/hr)]  Weight: 88.5 kg       Physical Exam  Constitutional:       General: She is not in acute distress.     Appearance: Normal appearance.   HENT:      Head: Normocephalic and atraumatic.      Mouth/Throat:      Mouth: Mucous membranes are moist.   Eyes:      Extraocular Movements: Extraocular movements intact.   Cardiovascular:      Rate and Rhythm: Normal rate and regular rhythm.      Pulses: Normal pulses.      Heart sounds: Normal heart sounds.   Pulmonary:      Effort: Pulmonary effort is normal.      Breath sounds: Normal breath sounds.   Abdominal:      General: Abdomen is flat.      Palpations: Abdomen is soft.   Musculoskeletal:      Right lower leg: No edema.      Left lower leg: No edema.      Comments: L foot in dressing from OR   Skin:     General: Skin is warm and dry.   Neurological:      Mental Status: She is alert and oriented to person, place, and time.         Relevant Results    Labs    Results from last 7 days   Lab Units 07/27/24  1122 07/26/24  0800 07/25/24  0759   WBC AUTO x10*3/uL 15.6* 12.5* 11.3   HEMOGLOBIN g/dL 11.0* 10.7* 10.7* " "  HEMATOCRIT % 34.2* 33.6* 33.8*   PLATELETS AUTO x10*3/uL 293 322 297            Results from last 7 days   Lab Units 07/27/24  1122 07/23/24  0949   SODIUM mmol/L 136 132*   POTASSIUM mmol/L 3.9 3.6   CO2 mmol/L 30 28   ANION GAP mmol/L 13 14   BUN mg/dL 15 14   CREATININE mg/dL 0.76 0.64   GLUCOSE mg/dL 121* 213*   EGFR mL/min/1.73m*2 86 >90   MAGNESIUM mg/dL 1.95 2.06   PHOSPHORUS mg/dL 3.6 3.7            No lab exists for component: \"BILIT\"     Results from last 7 days   Lab Units 07/24/24  1452   INR  1.1                 Medications  Scheduled medications  acetaminophen, 975 mg, oral, q8h  amoxicillin-pot clavulanate, 1 tablet, oral, q12h DAXA  doxycycline, 100 mg, oral, q12h DAXA  DULoxetine, 30 mg, oral, Daily  enoxaparin, 40 mg, subcutaneous, q24h  gabapentin, 300 mg, oral, TID  insulin lispro, 0-5 Units, subcutaneous, TID  polyethylene glycol, 17 g, oral, Daily  triamterene-hydrochlorothiazid, 1 tablet, oral, Daily         PRN medications  PRN medications: dextrose, dextrose, eucerin, glucagon, glucagon, HYDROmorphone, oxyCODONE, traMADol     Imaging  Lower extremity venous duplex bilateral    Result Date: 7/19/2024  No sonographic evidence for deep vein thrombosis within the evaluated veins of the bilateral lower extremities.   I personally reviewed the images/study and I agree with the findings as stated by Margarita Ng MD. This study was interpreted at [place], [place].   MACRO: None   Signed by: Giancarlo Castorena 7/19/2024 3:46 PM Dictation workstation:   [id number]    MR foot left wo IV contrast    Result Date: 7/19/2024  1. 1st interphalangeal region soft tissue ulcer with subjacent osteomyelitis of the 1st distal phalanx and distal aspect of 1st proximal phalanx. There is an additional medial fluid collection suspected with internal soft tissue gas which may reflect a tiny superficial abscess. 2. Increased T2 signal throughout the subcutaneous soft " tissues of the visualized lower extremity, consistent with generalized edema and/or cellulitis. 3. 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   I personally reviewed the images/study and I agree with the findings as stated. This study was interpreted at [place], [place].   MACRO: None   Signed by: Turner Salcedo 7/19/2024 9:19 AM Dictation workstation:   [id number]    XR foot left 3+ views    Result Date: 7/18/2024  1. Findings most compatible with osteomyelitis of the 1st toe with overlying wound/ulcer on the medial side of the toe. 2. Findings which may represent osteomyelitis of the head of the proximal phalanx of the 1st digit. 3. Soft tissue gas in the 1st toe for which differential considerations include sequelae of open nature of process gas-forming infection and/or any recent intervention. 4. MRI is recommended for further assessment.     I personally reviewed the images/study and I agree with the findings as stated. This study was interpreted at [place], [place].   MACRO: None   Signed by: Smooth Espinoza 7/18/2024 1:16 PM Dictation workstation:   [id number]    XR foot left 3+ views    Result Date: 7/7/2024  No acute osseous abnormality.  Moderate dorsal soft tissue swelling Signed by Dwight Villa MD          Assessment/Plan     Ramya Muller is a 69 y.o. female with poorly controlled T2DM (A1c 8%, 4/15/2024) initially treated for a diabetic foot soft tissue infection of the L 1st digit now presenting with worsening pain and with nonhealing ulcer that probes to bone with MRI L foot confirming osteomyelitis of L great toe with involvement of distal metatarsal w/o fluid collection (septic arthritis w/o drainable pocket). Underwent L hallux amputation with podiatry on 7/24/24.       Updates 7/27  -leukocytosis today, likely reactive from surgery  -patient and family agreed to go to  SNF, has stairs at home  -L hallux wound closure in OR 7/26/24, patient is WBAT on LLE in surgical boot per podiatry, will F/U with Dr. Herndon in 1 week    #L 1st digit diabetic ulcer  #Osteomyelitis  #Septic arthritis w/o drainable pocket  PLAN  -L hallux wound closure in OR 7/26/24, patient is WBAT on LLE in surgical boot per podiatry, will F/U with Dr. Herndon in 1 week  -Patient and family agreed to go to SNF    #T2DM c/b neuropathy  A1c 8.6%; BG WNL  PLAN  -SSI #1 for now, requiring 0-2 units per day  -hypoglycemia protocol  -Endo and PCP OP follow up on discharge  -Initiate antihyperglycemic agent on discharge: glipizide 5mg daily, patient not interested in GLP1 at this time  -ASCVD risk at least 13.3%; patient has reported anaphylaxis to statins  -Lipid panel ordered;      #Fibromyalgia and diabetic neuropathy  -C/w home Duloxetine 30 mg daily  -gabapentin 300mg TID     #AFIB, resolved  -Chart diagnosis. Patient is unaware of this diagnosis. No record of rate/rhythm control meds. EKG normal, no palpitations      #Lower leg cramps  -Iron profile c/w ACD; consider iron repletion for RLS tx when infection resolves  -DVT US BLE 7/19 negative    #ACD  -Iron studies consistent with anemia of chronic disease (iron low, transferrin WNL; ferritin elevated)  PLAN  -See above for diabetic OM     F: prn  E: prn  N: regular diet after OR  A: PIV  DVT: lovenox  GI: not indicated     Bowel Regimen: miralax azeem daily  Code Status: Full code (confirmed on admission)  TARYNK: Jennifer (daughter): [phone]      Jimbo Wynn MD

## 2024-07-27 NOTE — PROGRESS NOTES
Physical Therapy    Physical Therapy Treatment    Patient Name: Ramya Muller  MRN: [id number]  Today's Date: 7/27/2024  Time Calculation  Start Time: 1358  Stop Time: 1423  Time Calculation (min): 25 min       Assessment/Plan   PT Assessment  End of Session Communication: Bedside nurse  Assessment Comment: Pt to benefit from ongoing PT services to address the above limitations and to prepare pt for timely return to prior level of function.  End of Session Patient Position: Bed, 3 rail up, Alarm on     PT Plan  Treatment/Interventions: Bed mobility, Transfer training, Gait training, Stair training, Balance training, Strengthening, Therapeutic activity, Therapeutic exercise  PT Plan: Ongoing PT  PT Frequency: 4 times per week  PT Discharge Recommendations: Moderate intensity level of continued care   PT Recommended Transfer Status: Assist x1  PT - OK to Discharge: Yes (POC/goals/discharge rec intensity created)      General Visit Information:   PT  Visit  PT Received On: 07/27/24  Response to Previous Treatment: Patient with no complaints from previous session.  Reason for Referral: (L) great toe OM s/p hallux amputation 7/24. 7/26 s/p left foot incision and drainage.  Past Medical History Relevant to Rehab: DM, neuropathy, Afib, colon CA, fibromyalgia, HTN, ovarian CA  Prior to Session Communication: Bedside nurse  Patient Position Received: Bed, 3 rail up, Alarm off, not on at start of session  Family/Caregiver Present: No  Caregiver Feedback: n/a  General Comment: Agreeable to PT services     Subjective   Precautions:  Precautions  Hearing/Visual Limitations: WFL  LE Weight Bearing Status:  (Weightbearing: WBAT in surgical shoe. (Present in room, but incorrect size. Informed  of this; [facility] ordered new shoe. Taught NWMAGDALENE LLANIA in meantime.))  Medical Precautions: Fall precautions    Vital Signs:  Vital Signs  Heart Rate: 99 (109 during, 107 after rest)  SpO2: 96 % (96% post)  BP: 139/77 (139/83 (102)  post)  MAP (mmHg): 98  BP Method: Automatic    Objective   Pain:  Pain Assessment  Pain Assessment: 0-10  0-10 (Numeric) Pain Score: 6  Pain Type: Acute pain  Pain Location: Foot  Pain Orientation: Left  Pain Interventions:  (Mobility + rest)  Response to Interventions: Pain 7/10 post  Cognition:  Cognition  Overall Cognitive Status: Within Functional Limits  Arousal/Alertness: Appropriate responses to stimuli  Orientation Level: Oriented X4  Insight: Mild  Impulsive: Within functional limits  Processing Speed: Within funtional limits    Lines/Tubes/Drains: n/a       Continuous Medications/Drips: n/a       Oxygen: room air    Postural Control:   Postural Control  Postural Control: Within Functional Limits    Balance:   Static Sitting Balance  Static Sitting-Balance Support: No upper extremity supported  Static Sitting-Level of Assistance: Independent  Dynamic Sitting Balance  Dynamic Sitting-Balance Support: No upper extremity supported  Dynamic Sitting-Comments: Close sup to scoot to edge of bed    Static Standing Balance  Static Standing-Balance Support: Bilateral upper extremity supported  Static Standing-Level of Assistance: Minimum assistance (x1)  Static Standing-Comment/Number of Minutes: 1 + 1 min  Dynamic Standing Balance  Dynamic Standing-Balance Support: Bilateral upper extremity supported  Dynamic Standing-Comments: MIN A x1    PT Treatments:  Therapeutic Exercise  Therapeutic Exercise Performed: Yes  Therapeutic Exercise Activity 1: 10x B alternating LAQ prior to gait training              Bed Mobility  Bed Mobility: Yes  Bed Mobility 1  Bed Mobility 1: Supine to sitting  Level of Assistance 1: Minimum assistance (x1)  Bed Mobility Comments 1: HOB 30 deg.  Bed Mobility 2  Bed Mobility  2: Sitting to supine  Level of Assistance 2: Close supervision  Bed Mobility Comments 2: HOB 30 deg    Ambulation/Gait Training  Ambulation/Gait Training Performed: Yes  Ambulation/Gait Training 1  Surface 1: Level  tile  Device 1: Rolling walker  Gait Support Devices: Gait belt  Assistance 1: Minimum assistance (x1)  Quality of Gait 1:  (Pt performs by pivoting on RLE, keeping LLE elevated. Therapist demonstrated correct form prior to gait training and provided verbal/tactile cues for proper gait mechanics and maintaining NWB LLE. Therapist assists with waker mgmt.)  Comments/Distance (ft) 1: 3 ft x2 at bedside    Transfers  Transfer: Yes  Transfer 1  Transfer From 1: Bed to  Transfer to 1: Stand  Technique 1: Sit to stand, Stand to sit  Transfer Device 1: Walker, Gait belt  Transfer Level of Assistance 1: Minimum assistance (x1)  Trials/Comments 1: Verbal/tactile cues for maintaining NWB LLE. Pt able to keep toes off ground, but demonstrates difficulty keeping entire foot off floor. Verbal/tactile cues and assist to correct this. Pt performs 2x.  Transfers 2  Transfer From 2: Bed to, Toilet to  Transfer to 2: Commode-standard, Bed  Technique 2: Stand pivot  Transfer Device 2: Walker, Gait belt  Transfer Level of Assistance 2: Minimum assistance (x1)  Trials/Comments 2: Verbal/tactile cues for maintaining NWB LLE. Pt able to keep toes off ground, but demonstrates difficulty keeping entire foot off floor. Verbal/tactile cues and assist to correct this.    Stairs  Stairs: No              Outcome Measures:  [facility] Basic Mobility  Turning from your back to your side while in a flat bed without using bedrails: A little  Moving from lying on your back to sitting on the side of a flat bed without using bedrails: A little  Moving to and from bed to chair (including a wheelchair): A little  Standing up from a chair using your arms (e.g. wheelchair or bedside chair): A little  To walk in hospital room: A lot  Climbing 3-5 steps with railing: Total  Basic Mobility - Total Score: 15                        ICU Mobility Screen  Early Mobility/Exercise Safety Screen: Proceed with mobilization - No exclusion criteria met                    Education:  Education Documentation  Precautions, taught by Shreya Jeffers PT at 7/27/2024  2:36 PM.  Learner: Patient  Readiness: Acceptance  Method: Explanation  Response: Verbalizes Understanding, Needs Reinforcement    Mobility Training, taught by Shreya Jeffers PT at 7/27/2024  2:36 PM.  Learner: Patient  Readiness: Acceptance  Method: Explanation  Response: Verbalizes Understanding, Needs Reinforcement    Education Comments  No comments found.             Encounter Problems       Encounter Problems (Active)       Balance        Patient will maintain balance to allow for safe mobility with LRAD and CGA-> (S).  (Progressing)       Start:  07/26/24    Expected End:  08/09/24               Mobility       STG - Patient will ambulate with LRAD, CGA-> (S) maintaining weightbearing for 45 ft x2. (Progressing)       Start:  07/26/24    Expected End:  08/09/24            As feasibly safe to attempt, will navigate 2+ steps maintaining appropriate weightbearing status with mod ax1.  (Progressing)       Start:  07/26/24    Expected End:  08/09/24               PT Transfers       STG - Patient will transfer sit to and from stand with CGA-> (S).  (Progressing)       Start:  07/26/24    Expected End:  08/09/24            STG - Patient maintains weight bearing status during transfers with cues less than 25% of the time.  (Progressing)       Start:  07/26/24    Expected End:  08/09/24 07/27/24 at 2:37 PM   Shreya Jeffers, PT   Rehab Office: [phone]

## 2024-07-27 NOTE — PROGRESS NOTES
"Met with pt at bedside to see if she made a decision regarding SNF placement. Pt stated she does NOT want to discharge to a SNF because \"I don't like those places.\" Pt stated she lives at home with her daughters, primary support person is Mirna, and they assisted her in the past when she needed help. Pt stated she does not have any DME at home for ADL assistance, so she'll need a cane and an Ortho boot. Pt gave me permission to include leonora Bradley on discharge planning especially since she stated that she will be the main person providing assistance at home.   Spoke with leonora Bradley [phone] via phone to discuss therapy recs, Mirna feels pt should 100% discharge to a SNF. Mirna aware if pt discharges home per PT note she is NWB to LLE, she most likely will require x2 person assistance, a lot of assistance with transfer and she is not able to climb stairs. Mirna stated pt lives at home ALONE, there are a total of 15 stairs to get into her two family home. Mirna stated family would not be able to provide transport assistance to outside appointments. Furthermore, home care entering home is another issue since pt will be at home during the day, there will be no one available to let disciplines into home. Additionally, Mirna stated she works during the day, she have 3 kids to take care of, so \"I don't know what she's expecting of me.\" Mirna stated she will contact pt to further discuss and call me back. SNF list sent to daughter's email at [email or web address] in the event she is able to convince pt into SNF placement. Care Transitions team will continue to follow for discharge planning needs.   Addendum 1113: Leonora Bradley called me back and stated pt declined SNF with her, she is not going to argue with pt so \"send her home.\" Medical team notified of above and stated they are planning to send pt home, an Ortho boot was requested from central supply. Team to place home care referral/ [facility] " Home referral for RN/LPN (wound care assistance to LLE), PT/OT, HHA and SW services.   Addendum 1258: Pt is now agreeable to SNF placement, leonora Bradley provided SNF preferences via [facility]:   [facility] (accepted via [facility])  [facility] (accepted via [facility])  [facility] (declined acceptance due to no availabled bed)  Leonora Bradley [phone] updated regarding the accepting SNF's and stated she will look into their star ratings before deciding on [facility]. She agreed to call me back with [facility].    Chet Farrar RN  Transitional Care Coordinator/TCC  [id number]

## 2024-07-27 NOTE — PROGRESS NOTES
"Ramya Muller is a 69 y.o. female on day 9 of admission presenting with Diabetic ulcer of toe of left foot associated with diabetes mellitus of other type, unspecified ulcer stage (Multi).    Subjective   Patient was seen at bedside, resting comfortably. She states that she is getting burning pain in the foot. She denies any constitutional symptoms or other pedal complaints today.       Objective     Physical Exam:   General: AAOx3, no acute distress, left foot dressing intact    LLE focused exam: S/P hallux amputation, delayed primary closure with sesamoidectomy. Neurovascular status is intact. Surgical site is closed with sutures and staples, which are intact, and incision is well coapted. No active drainage. No necrosis at the incision.     Last Recorded Vitals  Blood pressure 139/77, pulse 99, temperature 37 °C (98.6 °F), temperature source Skin, resp. rate 17, height 1.57 m (5' 1.81\"), weight 88.5 kg (195 lb 1.7 oz), SpO2 96%.    Relevant Results    Results for orders placed or performed during the hospital encounter of 07/18/24 (from the past 24 hour(s))   POCT GLUCOSE   Result Value Ref Range    POCT Glucose 98 74 - 99 mg/dL   POCT GLUCOSE   Result Value Ref Range    POCT Glucose 210 (H) 74 - 99 mg/dL   POCT GLUCOSE   Result Value Ref Range    POCT Glucose 155 (H) 74 - 99 mg/dL   Renal function panel   Result Value Ref Range    Glucose 121 (H) 74 - 99 mg/dL    Sodium 136 136 - 145 mmol/L    Potassium 3.9 3.5 - 5.3 mmol/L    Chloride 97 (L) 98 - 107 mmol/L    Bicarbonate 30 21 - 32 mmol/L    Anion Gap 13 10 - 20 mmol/L    Urea Nitrogen 15 6 - 23 mg/dL    Creatinine 0.76 0.50 - 1.05 mg/dL    eGFR 86 >60 mL/min/1.73m*2    Calcium 9.5 8.6 - 10.6 mg/dL    Phosphorus 3.6 2.5 - 4.9 mg/dL    Albumin 3.6 3.4 - 5.0 g/dL   Magnesium   Result Value Ref Range    Magnesium 1.95 1.60 - 2.40 mg/dL   CBC and Auto Differential   Result Value Ref Range    WBC 15.6 (H) 4.4 - 11.3 x10*3/uL    nRBC 0.0 0.0 - 0.0 /100 WBCs    RBC " 4.21 4.00 - 5.20 x10*6/uL    Hemoglobin 11.0 (L) 12.0 - 16.0 g/dL    Hematocrit 34.2 (L) 36.0 - 46.0 %    MCV 81 80 - 100 fL    MCH 26.1 26.0 - 34.0 pg    MCHC 32.2 32.0 - 36.0 g/dL    RDW 11.9 11.5 - 14.5 %    Platelets 293 150 - 450 x10*3/uL    Neutrophils % 79.7 40.0 - 80.0 %    Immature Granulocytes %, Automated 1.0 (H) 0.0 - 0.9 %    Lymphocytes % 9.8 13.0 - 44.0 %    Monocytes % 7.0 2.0 - 10.0 %    Eosinophils % 1.9 0.0 - 6.0 %    Basophils % 0.6 0.0 - 2.0 %    Neutrophils Absolute 12.41 (H) 1.20 - 7.70 x10*3/uL    Immature Granulocytes Absolute, Automated 0.15 0.00 - 0.70 x10*3/uL    Lymphocytes Absolute 1.53 1.20 - 4.80 x10*3/uL    Monocytes Absolute 1.09 (H) 0.10 - 1.00 x10*3/uL    Eosinophils Absolute 0.29 0.00 - 0.70 x10*3/uL    Basophils Absolute 0.09 0.00 - 0.10 x10*3/uL   POCT GLUCOSE   Result Value Ref Range    POCT Glucose 151 (H) 74 - 99 mg/dL     Vascular US Ankle Brachial Index (ALLI) Without Exercise    Result Date: 7/25/2024            [place] [id number]   Tel [phone] and Fax [phone]  Vascular Lab Report VASC US PVR WITHOUT EXERCISE  Patient Name:     DORIE Turner           [id number]                                       Physician: Study Date:       7/25/2024           Ordering          [id number] EUGENE                                       Physician:        MK MRN/PID:          [id number]            Technologist:     Samantha Dockery T Accession#:       [id number]        Technologist 2: Date of           1955 / 69      Encounter#:       [id number] Birth/Age:        years Gender:           F Admission Status: Inpatient           Location          [facility]                                       Performed:  Diagnosis/ICD: Other specified diabetes mellitus (DM) with foot ulcer-E13.621 Indication:    s/p left foot 1st digit amputation CPT Codes:     23579 Peripheral artery ALLI Only  Patient  History S/P left foot 1st digit amputation.  CONCLUSIONS: Right Lower PVR: No evidence of arterial occlusive disease in the right lower extremity at rest. Normal digital perfusion noted. Multiphasic flow is noted in the right common femoral artery, right posterior tibial artery and right dorsalis pedis artery. Left Lower PVR: No evidence of arterial occlusive disease in the left lower extremity at rest. Multiphasic flow is noted in the left common femoral artery, left posterior tibial artery and left dorsalis pedis artery. 1st digit amputation.  Imaging & Doppler Findings:  RIGHT Lower PVR                Pressures Ratios Right Posterior Tibial (Ankle) 255 mmHg  1.90 Right Dorsalis Pedis (Ankle)   255 mmHg  1.90 Right Digit (Great Toe)        118 mmHg  0.88   LEFT Lower PVR                Pressures Ratios Left Posterior Tibial (Ankle) 255 mmHg  1.90 Left Dorsalis Pedis (Ankle)   255 mmHg  1.90                     Right     Left Brachial Pressure 134 mmHg 130 mmHg   [id number] Pablito Vu DO Electronically signed by [id number] Pablito Vu DO on 7/25/2024 at 3:44:33 PM  ** Final **     ECG 12 Lead    Result Date: 7/22/2024   Poor data quality, interpretation may be adversely affected Normal sinus rhythm Right bundle branch block Abnormal ECG No previous ECGs available Confirmed by Vince Sullivan ([id number]) on 7/22/2024 5:07:39 PM    Lower extremity venous duplex bilateral    Result Date: 7/19/2024  Interpreted By:  Giancarlo Castorena and Calo Sean-Matthew STUDY: [facility] LOWER EXTREMITY VENOUS DUPLEX BILATERAL;  7/19/2024 3:10 pm   INDICATION: Signs/Symptoms:Risk for DVT, complaining of bilateral calf pain / cramps and tightness.   COMPARISON: None.   ACCESSION NUMBER(S): [id number]   ORDERING CLINICIAN: AASHISH NEWSOME   TECHNIQUE: Vascular ultrasound of the bilateral lower extremities was performed. Real-time compression views as well as Gray scale, color Doppler and spectral Doppler waveform analysis was performed.   FINDINGS:  Evaluation of the visualized portions of the bilateral common femoral, proximal, mid, and distal femoral, and popliteal veins was performed.  Evaluation of the visualized portions of the bilateral posterior tibial and left peroneal veins was also performed.   Limitations: None.   The evaluated veins demonstrate normal compressibility. There is intact venous flow demonstrating normal respiratory variability and normal augmentation of flow with calf compression.       No sonographic evidence for deep vein thrombosis within the evaluated veins of the bilateral lower extremities.   I personally reviewed the images/study and I agree with the findings as stated by Margarita Ng MD. This study was interpreted at [place], [place].   MACRO: None   Signed by: Giancarlo Castorena 7/19/2024 3:46 PM Dictation workstation:   [id number]    MR foot left wo IV contrast    Result Date: 7/19/2024  Interpreted By:  Turner Salcedo and Baker Zachary STUDY: MRI of the left foot and ankle without IV contrast; 7/19/2024 1:17 am   INDICATION: Signs/Symptoms:Diabetic foot with osteomyelitis with edema in whole left foot.   COMPARISON: Left foot radiographs on 07/18/2024.   ACCESSION NUMBER(S): [id number]   ORDERING CLINICIAN: JERROD GONZALEZ   TECHNIQUE: MR imaging of the  left foot and ankle was obtained  without administration of intravenous contrast medium.   FINDINGS: TENDONS: There is Achilles tendinosis. This peroneal tendinosis. There thickening of the plantar fascia. The remainder of the tendons are intact.   LIGAMENTS: The major ligamentous structures of the forefoot are normal. The Lisfranc ligament is normal.   JOINTS/OSSEOUS STRUCTURES: There is T1 hypointense signal with associated T2/STIR hyperintensity involving the entirety of the 1st distal phalanx, consistent with osteomyelitis. Additional areas of irregular T1 hypointensity with associated T2/STIR hyperintensity throughout  the mid to distal 1st proximal phalanx, likely representing additional site of osteomyelitis. There is also focus of T2 hyperintense signal with associated T1 hypointense signal within distal 1st metatarsal (series 10, image 16), favored to to be degenerative in nature. There is no evidence of significant joint effusion in the forefoot or midfoot. There is no evidence of acute fracture or dislocation.   SOFT TISSUES: There is skin surface irregularity and ulceration along the medial aspect of the distal 1st digit with significant soft tissue edema about the entirety of the 1st digit. There is a questionable relatively conglomerate focus of T2 hyperintense signal at the plantar medial aspect of the 1st proximal phalangeal head measuring approximately 14 x 11 mm (series 10, image 16). This is very superficial and likely contiguous with the sinus tract. There is generalized atrophy of the musculature of the foot. There is additionally increased T2 signal intensity throughout the musculature. There is edema throughout the subcutaneous soft tissues of the visualized foot.       1. 1st interphalangeal region soft tissue ulcer with subjacent osteomyelitis of the 1st distal phalanx and distal aspect of 1st proximal phalanx. There is an additional medial fluid collection suspected with internal soft tissue gas which may reflect a tiny superficial abscess. 2. Increased T2 signal throughout the subcutaneous soft tissues of the visualized lower extremity, consistent with generalized edema and/or cellulitis. 3. 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   I personally reviewed the images/study and I agree with the findings as stated. This study was interpreted at [place], [place].   MACRO: None   Signed by: Turner Salcedo 7/19/2024 9:19 AM Dictation workstation:   [id number]    XR foot left 3+ views    Result Date: 7/18/2024  Interpreted By:  Olga  Link Rebollar STUDY: XR FOOT LEFT 3+ VIEWS; ;  7/18/2024 12:20 pm   INDICATION: Signs/Symptoms:L toe infection.   COMPARISON: XR left foot 07/06/2024   ACCESSION NUMBER(S): [id number]   ORDERING CLINICIAN: ALEJANDRO DUTTA   FINDINGS: Decreased radiodensity 1st toe distal phalanx with loss of normal trabecular architecture and erosion of overlying cortex, with destruction of the bone stock. There is also medial displacement of eroded cortical fragments. There is question of some lucency with erosion to the medial side of the head of the proximal phalanx of the 1st digit. Osteophyte present at the plantar aspect of the calcaneus. Enthesophyte noted at posterior aspect of calcaneal tuberosity. Mild degenerative changes of the talocalcaneonavicular joint and forefoot, worst at 1st MTP and DIP joints. There is a wound/ulcer on the distal medial aspect of the 1st toe. There are mottled foci of lucency projecting over the 1st toe most evident along the medial aspect of the toe at the level of the distal metaphysis of the proximal phalanx       1. Findings most compatible with osteomyelitis of the 1st toe with overlying wound/ulcer on the medial side of the toe. 2. Findings which may represent osteomyelitis of the head of the proximal phalanx of the 1st digit. 3. Soft tissue gas in the 1st toe for which differential considerations include sequelae of open nature of process gas-forming infection and/or any recent intervention. 4. MRI is recommended for further assessment.     I personally reviewed the images/study and I agree with the findings as stated. This study was interpreted at [place], [place].   MACRO: None   Signed by: Smooth Espinoza 7/18/2024 1:16 PM Dictation workstation:   [id number]    XR foot left 3+ views    Result Date: 7/7/2024  STUDY: Foot Radiographs; 07/06/2024 11:40 PM INDICATION: Left foot wound, new.  COMPARISON: None available.  ACCESSION  NUMBER(S): [id number] ORDERING CLINICIAN: TIERRA POLLACK TECHNIQUE:  Three view(s) of the left foot. FINDINGS:  There is no displaced fracture.  The alignment is anatomic.  Moderate dorsal soft tissue swelling.  No suspicious osseous erosive lesion identified.    No acute osseous abnormality.  Moderate dorsal soft tissue swelling Signed by Dwight Villa MD         Assessment/Plan   Principal Problem:    Diabetic ulcer of toe of left foot associated with diabetes mellitus of other type, unspecified ulcer stage (Multi)  Active Problems:    Type 2 diabetes mellitus with diabetic peripheral angiopathy without gangrene, without long-term current use of insulin (Multi)    Pyogenic inflammation of bone (Multi)    Obesity    Diabetic neuropathy (Multi)    Fibromyalgia    #S/P left foot hallux amputation (DOS ) and sesamoidectomy with delayed primary closure (DOS )  #Osteomyelitis, left foot  #Cellulitis, left foot    -Patient was seen at bedside  -Surgical dressing was changed with betadine, adaptic, 4x4s, kerlix, ace  -Spoke with patient regarding post-op care and follow-up instructions which are as follows:    -Dressing from today is to be left clean, dry, and intact until follow-up. No dressing changes needed    -No showering without cast cover    -WBAT to left foot in surgical shoe. Instructed patient to limit her activity    -Follow-up with Dr. Herndon in his office within 1 week of discharge  -Please order surgical shoe for left foot.   -Patient is okay to discharge from podiatry standpoint  -Podiatry to sign off  -Follow-up info was placed in discharge orders     Barry Beavers DPM PGY-3     I reviewed the resident/fellow's documentation and discussed the patient with the resident/fellow. I agree with the resident/fellow's medical decision making as documented in the note.     Crow Herndon DPM

## 2024-07-27 NOTE — CARE PLAN
The patient's goals for the shift include Patient willreceived IV antibiotics as ordered this shift    The clinical goals for the shift include Patients pain will be a 5 or less throughout the shift        Problem: Skin  Goal: Decreased wound size/increased tissue granulation at next dressing change  Outcome: Progressing  Flowsheets (Taken 7/25/2024 0729 by Giancarlo Phan RN)  Decreased wound size/increased tissue granulation at next dressing change: Promote sleep for wound healing     Problem: Pain  Goal: Takes deep breaths with improved pain control throughout the shift  Outcome: Progressing  Note: Practiced breathing techniques      Problem: Pain  Goal: Free from opioid side effects throughout the shift  Outcome: Met  Note: No side effects this shift

## 2024-07-27 NOTE — DISCHARGE INSTRUCTIONS
Dear Ramya Muller,    You were admitted to [facility] from 7/18/24 to 7/27/24 for a diabetic foot wound. You were given antibiotics to treat any potential infection in your toe. Podiatry amputated your left big toe. They kept the wound open for 2 days so they could take a second look before closing up the wound. The samples taken from the wound did not grow any bacteria. You will need to call podiatry at [phone] to set up an appointment in the next week with Dr. Herndon.     For your diabetes, we started you on a new medication called glipizide 5mg. You will follow up with endocrinology.    It was a pleasure taking care of you,    Your  Care Team       CPAP Settings: 10 cm H2O

## 2024-07-27 NOTE — NURSING NOTE
Patient was in the restroom and her surgical foot slipped out from under her, she did not fall, was on one knee, several staff helped get the patient into bed. Pt refused to let us call family, she states she is not hurt, there are no scrapes, cuts or skin tears.. she just overestimated her capacity since her foot surgery yesterday. Vitals are fine, I did make a pass report . Bed alarm on and patient told to call for help before trying to get up for any reason.

## 2024-07-27 NOTE — CARE PLAN
Patient calm and cooperative with care, complaints of pain in the surgical foot, pain medications administered with decrease in pain, pt overestimating ability to ambulate on her own, pt told to press call light for help to get up..  patient had an incident in the restroom her foot slipped out from underneath her and she was on one knee in the bathroom, she stated she did not fall. Several staff members helped patient get back in bed, no injuries noted. RN asked patient if we should call her family and she refused. Pass report made and safety huddle with staff complete.  Physician came up to see patient.       The clinical goals for the shift include Patients pain will be a 5 or less throughout the shift  Problem: Pain  Goal: Takes deep breaths with improved pain control throughout the shift  Outcome: Progressing  Goal: Turns in bed with improved pain control throughout the shift  Outcome: Progressing  Goal: Walks with improved pain control throughout the shift  Outcome: Progressing       Over the shift, the patient did make progress toward the following goals.

## 2024-07-28 VITALS
WEIGHT: 195.11 LBS | HEART RATE: 88 BPM | RESPIRATION RATE: 19 BRPM | BODY MASS INDEX: 35.9 KG/M2 | OXYGEN SATURATION: 97 % | TEMPERATURE: 97.3 F | HEIGHT: 62 IN | DIASTOLIC BLOOD PRESSURE: 68 MMHG | SYSTOLIC BLOOD PRESSURE: 122 MMHG

## 2024-07-28 LAB
BACTERIA SPEC CULT: NORMAL
BASOPHILS # BLD AUTO: 0.09 X10*3/UL (ref 0–0.1)
BASOPHILS NFR BLD AUTO: 0.6 %
CRP SERPL-MCNC: 9.04 MG/DL
EOSINOPHIL # BLD AUTO: 0.33 X10*3/UL (ref 0–0.7)
EOSINOPHIL NFR BLD AUTO: 2.1 %
ERYTHROCYTE [DISTWIDTH] IN BLOOD BY AUTOMATED COUNT: 11.9 % (ref 11.5–14.5)
GLUCOSE BLD MANUAL STRIP-MCNC: 117 MG/DL (ref 74–99)
GLUCOSE BLD MANUAL STRIP-MCNC: 121 MG/DL (ref 74–99)
GLUCOSE BLD MANUAL STRIP-MCNC: 146 MG/DL (ref 74–99)
GLUCOSE BLD MANUAL STRIP-MCNC: 169 MG/DL (ref 74–99)
GRAM STN SPEC: NORMAL
GRAM STN SPEC: NORMAL
HCT VFR BLD AUTO: 33.5 % (ref 36–46)
HGB BLD-MCNC: 10.5 G/DL (ref 12–16)
IMM GRANULOCYTES # BLD AUTO: 0.11 X10*3/UL (ref 0–0.7)
IMM GRANULOCYTES NFR BLD AUTO: 0.7 % (ref 0–0.9)
LYMPHOCYTES # BLD AUTO: 1.54 X10*3/UL (ref 1.2–4.8)
LYMPHOCYTES NFR BLD AUTO: 10 %
MCH RBC QN AUTO: 25.8 PG (ref 26–34)
MCHC RBC AUTO-ENTMCNC: 31.3 G/DL (ref 32–36)
MCV RBC AUTO: 82 FL (ref 80–100)
MONOCYTES # BLD AUTO: 0.9 X10*3/UL (ref 0.1–1)
MONOCYTES NFR BLD AUTO: 5.8 %
NEUTROPHILS # BLD AUTO: 12.45 X10*3/UL (ref 1.2–7.7)
NEUTROPHILS NFR BLD AUTO: 80.8 %
NRBC BLD-RTO: 0 /100 WBCS (ref 0–0)
PLATELET # BLD AUTO: 273 X10*3/UL (ref 150–450)
RBC # BLD AUTO: 4.07 X10*6/UL (ref 4–5.2)
WBC # BLD AUTO: 15.4 X10*3/UL (ref 4.4–11.3)

## 2024-07-28 PROCEDURE — 2500000004 HC RX 250 GENERAL PHARMACY W/ HCPCS (ALT 636 FOR OP/ED)

## 2024-07-28 PROCEDURE — 85025 COMPLETE CBC W/AUTO DIFF WBC: CPT | Performed by: INTERNAL MEDICINE

## 2024-07-28 PROCEDURE — 2500000001 HC RX 250 WO HCPCS SELF ADMINISTERED DRUGS (ALT 637 FOR MEDICARE OP)

## 2024-07-28 PROCEDURE — 99232 SBSQ HOSP IP/OBS MODERATE 35: CPT

## 2024-07-28 PROCEDURE — 2500000001 HC RX 250 WO HCPCS SELF ADMINISTERED DRUGS (ALT 637 FOR MEDICARE OP): Performed by: INTERNAL MEDICINE

## 2024-07-28 PROCEDURE — 36415 COLL VENOUS BLD VENIPUNCTURE: CPT | Performed by: INTERNAL MEDICINE

## 2024-07-28 PROCEDURE — 2500000002 HC RX 250 W HCPCS SELF ADMINISTERED DRUGS (ALT 637 FOR MEDICARE OP, ALT 636 FOR OP/ED)

## 2024-07-28 PROCEDURE — 82947 ASSAY GLUCOSE BLOOD QUANT: CPT

## 2024-07-28 PROCEDURE — 1100000001 HC PRIVATE ROOM DAILY

## 2024-07-28 RX ORDER — POLYETHYLENE GLYCOL 3350 17 G/17G
17 POWDER, FOR SOLUTION ORAL 2 TIMES DAILY
Status: DISCONTINUED | OUTPATIENT
Start: 2024-07-28 | End: 2024-07-30 | Stop reason: HOSPADM

## 2024-07-28 RX ORDER — POLYETHYLENE GLYCOL 3350 17 G/17G
17 POWDER, FOR SOLUTION ORAL 2 TIMES DAILY
Status: CANCELLED
Start: 2024-07-28

## 2024-07-28 RX ORDER — AMOXICILLIN 250 MG
2 CAPSULE ORAL 2 TIMES DAILY
Status: CANCELLED
Start: 2024-07-28

## 2024-07-28 RX ORDER — AMOXICILLIN 250 MG
2 CAPSULE ORAL 2 TIMES DAILY
Status: DISCONTINUED | OUTPATIENT
Start: 2024-07-28 | End: 2024-07-30 | Stop reason: HOSPADM

## 2024-07-28 ASSESSMENT — COGNITIVE AND FUNCTIONAL STATUS - GENERAL
CLIMB 3 TO 5 STEPS WITH RAILING: A LITTLE
STANDING UP FROM CHAIR USING ARMS: A LITTLE
MOBILITY SCORE: 21
WALKING IN HOSPITAL ROOM: A LITTLE
DAILY ACTIVITIY SCORE: 24

## 2024-07-28 ASSESSMENT — PAIN - FUNCTIONAL ASSESSMENT
PAIN_FUNCTIONAL_ASSESSMENT: WONG-BAKER FACES
PAIN_FUNCTIONAL_ASSESSMENT: 0-10

## 2024-07-28 ASSESSMENT — PAIN SCALES - GENERAL
PAINLEVEL_OUTOF10: 4
PAINLEVEL_OUTOF10: 7
PAINLEVEL_OUTOF10: 0 - NO PAIN
PAINLEVEL_OUTOF10: 0 - NO PAIN
PAINLEVEL_OUTOF10: 7
PAINLEVEL_OUTOF10: 0 - NO PAIN
PAINLEVEL_OUTOF10: 7

## 2024-07-28 ASSESSMENT — PAIN DESCRIPTION - LOCATION: LOCATION: FOOT

## 2024-07-28 ASSESSMENT — PAIN DESCRIPTION - ORIENTATION: ORIENTATION: LEFT

## 2024-07-28 NOTE — CARE PLAN
Problem: Pain  Goal: Takes deep breaths with improved pain control throughout the shift  Outcome: Progressing  Goal: Turns in bed with improved pain control throughout the shift  Outcome: Progressing  Goal: Walks with improved pain control throughout the shift  Outcome: Progressing  Goal: Performs ADL's with improved pain control throughout shift  Outcome: Progressing  Goal: Participates in PT with improved pain control throughout the shift  Outcome: Progressing  Goal: Free from acute confusion related to pain meds throughout the shift  Outcome: Progressing   The patient's goals for the shift include Patient willreceived IV antibiotics as ordered this shift    The clinical goals for the shift include Patient will remain safe and free from falls/injuries overnight    Over the shift, the patient did make progress toward the following goals and continues to work on pain management.

## 2024-07-28 NOTE — PROGRESS NOTES
"  MEDICINE INPATIENT PROGRESS NOTE      Ramya Muller is a 69 y.o. female on day 10 of admission presenting with Diabetic ulcer of toe of left foot associated with diabetes mellitus of other type, unspecified ulcer stage (Multi).    Subjective   Patient seen and examined this morning. Patient resting in bed. Only complaint is no BM in several days. Otherwise feels well. Awaiting SNF placement.        Objective   Last Recorded Vitals  Blood pressure 124/77, pulse 90, temperature 37 °C (98.6 °F), temperature source Temporal, resp. rate 16, height 1.57 m (5' 1.81\"), weight 88.5 kg (195 lb 1.7 oz), SpO2 97%.  Intake/Output last 3 Shifts:  I/O last 3 completed shifts:  In: 480 (5.4 mL/kg) [P.O.:480]  Out: - (0 mL/kg)   Weight: 88.5 kg       Physical Exam  Constitutional: Alert and oriented, NAD  HEENT: NC/AT, no scleral icterus, throat non-erythematous and without lesions  Cardiovascular: RRR, no MRG  Respiratory: CTAB, no wheezes, rales, or rhonchi   Gastrointestinal: abdomen soft, no TTP, bowel sounds normoactive  Skin: warm, well-perfused, no rashes or lesions   Extremities: no lower extremity edema; left foot with dressing and wrap in place    Neuro: no focal deficits  Psych: appropriate affect     Relevant Results  Labs    Results from last 7 days   Lab Units 07/27/24  1122 07/26/24  0800 07/25/24  0759   WBC AUTO x10*3/uL 15.6* 12.5* 11.3   HEMOGLOBIN g/dL 11.0* 10.7* 10.7*   HEMATOCRIT % 34.2* 33.6* 33.8*   PLATELETS AUTO x10*3/uL 293 322 297            Results from last 7 days   Lab Units 07/27/24  1122 07/23/24  0949   SODIUM mmol/L 136 132*   POTASSIUM mmol/L 3.9 3.6   CO2 mmol/L 30 28   ANION GAP mmol/L 13 14   BUN mg/dL 15 14   CREATININE mg/dL 0.76 0.64   GLUCOSE mg/dL 121* 213*   EGFR mL/min/1.73m*2 86 >90   MAGNESIUM mg/dL 1.95 2.06   PHOSPHORUS mg/dL 3.6 3.7            No lab exists for component: \"BILIT\"     Results from last 7 days   Lab Units 07/24/24  1452   INR  1.1           "   Medications  Scheduled medications  acetaminophen, 975 mg, oral, q8h  DULoxetine, 30 mg, oral, Daily  enoxaparin, 40 mg, subcutaneous, q24h  gabapentin, 300 mg, oral, TID  insulin lispro, 0-5 Units, subcutaneous, TID  polyethylene glycol, 17 g, oral, BID  sennosides-docusate sodium, 2 tablet, oral, BID  triamterene-hydrochlorothiazid, 1 tablet, oral, Daily         PRN medications  PRN medications: dextrose, dextrose, eucerin, glucagon, glucagon, HYDROmorphone, oxyCODONE, traMADol     Imaging  Lower extremity venous duplex bilateral    Result Date: 7/19/2024  No sonographic evidence for deep vein thrombosis within the evaluated veins of the bilateral lower extremities.   I personally reviewed the images/study and I agree with the findings as stated by Margarita Ng MD. This study was interpreted at [place], [place].   MACRO: None   Signed by: Giancarlo Castorena 7/19/2024 3:46 PM Dictation workstation:   [id number]    MR foot left wo IV contrast    Result Date: 7/19/2024  1. 1st interphalangeal region soft tissue ulcer with subjacent osteomyelitis of the 1st distal phalanx and distal aspect of 1st proximal phalanx. There is an additional medial fluid collection suspected with internal soft tissue gas which may reflect a tiny superficial abscess. 2. Increased T2 signal throughout the subcutaneous soft tissues of the visualized lower extremity, consistent with generalized edema and/or cellulitis. 3. 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   I personally reviewed the images/study and I agree with the findings as stated. This study was interpreted at [place], [place].   MACRO: None   Signed by: Turner Salcedo 7/19/2024 9:19 AM Dictation workstation:   [id number]    XR foot left 3+ views    Result Date: 7/18/2024  1. Findings most compatible with osteomyelitis of the 1st toe with overlying  wound/ulcer on the medial side of the toe. 2. Findings which may represent osteomyelitis of the head of the proximal phalanx of the 1st digit. 3. Soft tissue gas in the 1st toe for which differential considerations include sequelae of open nature of process gas-forming infection and/or any recent intervention. 4. MRI is recommended for further assessment.     I personally reviewed the images/study and I agree with the findings as stated. This study was interpreted at [facility], [place], [place].   MACRO: None   Signed by: Smooth Espinoza 7/18/2024 1:16 PM Dictation workstation:   [id number]    XR foot left 3+ views    Result Date: 7/7/2024  No acute osseous abnormality.  Moderate dorsal soft tissue swelling Signed by Dwight Villa MD          Assessment/Plan     Ramya Muller is a 69 y.o. female with poorly controlled T2DM (A1c 8%, 4/15/2024) initially treated for a diabetic foot soft tissue infection of the L 1st digit now presenting with worsening pain and with nonhealing ulcer that probes to bone with MRI L foot confirming osteomyelitis of L great toe with involvement of distal metatarsal w/o fluid collection (septic arthritis w/o drainable pocket). Underwent L hallux amputation with podiatry on 7/24/24.     Updates 7/28  - abx completed   - podiatry dressed foot 7/27 - no further dressing changes until first podiatry outpt follow up   - miralax azeem BID, doc-senna 2 tabs BID   - awaiting SNF placement     #L 1st digit diabetic ulcer  #Osteomyelitis  #Septic arthritis w/o drainable pocket  PLAN  -L hallux wound closure in OR 7/26/24, patient is WBAT on LLE in surgical boot per podiatry, will F/U with Dr. Herndon in 1 week  -Patient and family agreed to go to SNF    #T2DM c/b neuropathy  A1c 8.6%; BG WNL  PLAN  -SSI #1 for now, requiring 0-2 units per day  -hypoglycemia protocol  -Endo and PCP OP follow up on discharge  -Initiate antihyperglycemic agent on discharge: glipizide 5mg  daily, patient not interested in GLP1 at this time  -ASCVD risk at least 13.3%; patient has reported anaphylaxis to statins  -Lipid panel ordered;      #Fibromyalgia and diabetic neuropathy  -C/w home Duloxetine 30 mg daily  -gabapentin 300mg TID     #AFIB, resolved  -Chart diagnosis. Patient is unaware of this diagnosis. No record of rate/rhythm control meds. EKG normal, no palpitations      #Lower leg cramps  -Iron profile c/w ACD; consider iron repletion for RLS tx when infection resolves  -DVT US BLE 7/19 negative    #ACD  -Iron studies consistent with anemia of chronic disease (iron low, transferrin WNL; ferritin elevated)  PLAN  -See above for diabetic OM     F: prn  E: prn  N: regular diet after OR  A: PIV  DVT: lovenox  GI: not indicated     Bowel Regimen: miralax azeem BID, doc-senna 2 tabs BID   Code Status: Full code (confirmed on admission)  TARYNK: Jennifer (daughter): [phone]      Michael Leon MD   Internal Medicine, PGY-3  [facility]

## 2024-07-28 NOTE — CARE PLAN
The patient's goals for the shift include pain control    The clinical goals for the shift include Patient will remain safe and free from falls/injuries overnight    Patient was given scheduled and PRN medication which was effective, no other issues to report

## 2024-07-28 NOTE — PROGRESS NOTES
TCC spoke with Mirna rasmussen) regarding FOC. [facility] choice is [facility], second choice is  [facility]. TCC sent referral to [facility] for review. Will follow up.     Addendum 2:23 PM  No response via careport from [facility], TCC called and their admission department appears to be off for the weekend. Additionally [facility], which is patient second choice does not have any openings until tomorrow. MD and Mirna (norma) made aware.    Patient likely to discharge tomorrow once accepting FOC has been established.

## 2024-07-29 ENCOUNTER — APPOINTMENT (OUTPATIENT)
Dept: PRIMARY CARE | Facility: CLINIC | Age: 69
End: 2024-07-29
Payer: COMMERCIAL

## 2024-07-29 LAB
BACTERIA SPEC CULT: NORMAL
BASOPHILS # BLD AUTO: 0.08 X10*3/UL (ref 0–0.1)
BASOPHILS NFR BLD AUTO: 0.6 %
EOSINOPHIL # BLD AUTO: 0.4 X10*3/UL (ref 0–0.7)
EOSINOPHIL NFR BLD AUTO: 2.8 %
ERYTHROCYTE [DISTWIDTH] IN BLOOD BY AUTOMATED COUNT: 11.9 % (ref 11.5–14.5)
FUNGUS SPEC CULT: NORMAL
FUNGUS SPEC CULT: NORMAL
FUNGUS SPEC FUNGUS STN: NORMAL
FUNGUS SPEC FUNGUS STN: NORMAL
GLUCOSE BLD MANUAL STRIP-MCNC: 126 MG/DL (ref 74–99)
GLUCOSE BLD MANUAL STRIP-MCNC: 131 MG/DL (ref 74–99)
GLUCOSE BLD MANUAL STRIP-MCNC: 165 MG/DL (ref 74–99)
GRAM STN SPEC: NORMAL
GRAM STN SPEC: NORMAL
HCT VFR BLD AUTO: 34.3 % (ref 36–46)
HGB BLD-MCNC: 10.8 G/DL (ref 12–16)
IMM GRANULOCYTES # BLD AUTO: 0.09 X10*3/UL (ref 0–0.7)
IMM GRANULOCYTES NFR BLD AUTO: 0.6 % (ref 0–0.9)
LYMPHOCYTES # BLD AUTO: 1.62 X10*3/UL (ref 1.2–4.8)
LYMPHOCYTES NFR BLD AUTO: 11.1 %
MCH RBC QN AUTO: 25.8 PG (ref 26–34)
MCHC RBC AUTO-ENTMCNC: 31.5 G/DL (ref 32–36)
MCV RBC AUTO: 82 FL (ref 80–100)
MONOCYTES # BLD AUTO: 0.82 X10*3/UL (ref 0.1–1)
MONOCYTES NFR BLD AUTO: 5.6 %
NEUTROPHILS # BLD AUTO: 11.53 X10*3/UL (ref 1.2–7.7)
NEUTROPHILS NFR BLD AUTO: 79.3 %
NRBC BLD-RTO: 0 /100 WBCS (ref 0–0)
PLATELET # BLD AUTO: 301 X10*3/UL (ref 150–450)
RBC # BLD AUTO: 4.19 X10*6/UL (ref 4–5.2)
WBC # BLD AUTO: 14.5 X10*3/UL (ref 4.4–11.3)

## 2024-07-29 PROCEDURE — 2500000001 HC RX 250 WO HCPCS SELF ADMINISTERED DRUGS (ALT 637 FOR MEDICARE OP)

## 2024-07-29 PROCEDURE — 2500000002 HC RX 250 W HCPCS SELF ADMINISTERED DRUGS (ALT 637 FOR MEDICARE OP, ALT 636 FOR OP/ED)

## 2024-07-29 PROCEDURE — 2500000004 HC RX 250 GENERAL PHARMACY W/ HCPCS (ALT 636 FOR OP/ED)

## 2024-07-29 PROCEDURE — 36415 COLL VENOUS BLD VENIPUNCTURE: CPT | Performed by: INTERNAL MEDICINE

## 2024-07-29 PROCEDURE — 82947 ASSAY GLUCOSE BLOOD QUANT: CPT

## 2024-07-29 PROCEDURE — 99232 SBSQ HOSP IP/OBS MODERATE 35: CPT

## 2024-07-29 PROCEDURE — 85025 COMPLETE CBC W/AUTO DIFF WBC: CPT | Performed by: INTERNAL MEDICINE

## 2024-07-29 PROCEDURE — 1100000001 HC PRIVATE ROOM DAILY

## 2024-07-29 PROCEDURE — 97165 OT EVAL LOW COMPLEX 30 MIN: CPT | Mod: GO

## 2024-07-29 RX ORDER — OXYCODONE HYDROCHLORIDE 5 MG/1
5 TABLET ORAL EVERY 6 HOURS PRN
Qty: 15 TABLET | Refills: 0 | Status: SHIPPED | OUTPATIENT
Start: 2024-07-29 | End: 2024-08-03

## 2024-07-29 RX ORDER — AMOXICILLIN 250 MG
2 CAPSULE ORAL 2 TIMES DAILY
Start: 2024-07-29

## 2024-07-29 RX ORDER — BISACODYL 10 MG/1
10 SUPPOSITORY RECTAL ONCE
Status: COMPLETED | OUTPATIENT
Start: 2024-07-29 | End: 2024-07-29

## 2024-07-29 RX ORDER — POLYETHYLENE GLYCOL 3350 17 G/17G
17 POWDER, FOR SOLUTION ORAL 2 TIMES DAILY
Start: 2024-07-29

## 2024-07-29 ASSESSMENT — COGNITIVE AND FUNCTIONAL STATUS - GENERAL
DAILY ACTIVITIY SCORE: 18
STANDING UP FROM CHAIR USING ARMS: A LITTLE
EATING MEALS: A LITTLE
DRESSING REGULAR LOWER BODY CLOTHING: A LITTLE
DAILY ACTIVITIY SCORE: 24
HELP NEEDED FOR BATHING: A LITTLE
WALKING IN HOSPITAL ROOM: A LITTLE
PERSONAL GROOMING: A LITTLE
DAILY ACTIVITIY SCORE: 18
DRESSING REGULAR UPPER BODY CLOTHING: A LITTLE
CLIMB 3 TO 5 STEPS WITH RAILING: A LITTLE
PERSONAL GROOMING: A LITTLE
TOILETING: A LITTLE
STANDING UP FROM CHAIR USING ARMS: A LITTLE
EATING MEALS: A LITTLE
TOILETING: A LITTLE
HELP NEEDED FOR BATHING: A LITTLE
DRESSING REGULAR LOWER BODY CLOTHING: A LITTLE
DRESSING REGULAR UPPER BODY CLOTHING: A LITTLE
MOBILITY SCORE: 21
MOBILITY SCORE: 21
CLIMB 3 TO 5 STEPS WITH RAILING: A LITTLE
WALKING IN HOSPITAL ROOM: A LITTLE

## 2024-07-29 ASSESSMENT — PAIN DESCRIPTION - ORIENTATION: ORIENTATION: LEFT

## 2024-07-29 ASSESSMENT — PAIN SCALES - GENERAL
PAINLEVEL_OUTOF10: 8
PAINLEVEL_OUTOF10: 8
PAINLEVEL_OUTOF10: 9
PAINLEVEL_OUTOF10: 7

## 2024-07-29 ASSESSMENT — PAIN - FUNCTIONAL ASSESSMENT
PAIN_FUNCTIONAL_ASSESSMENT: 0-10

## 2024-07-29 ASSESSMENT — PAIN DESCRIPTION - DESCRIPTORS
DESCRIPTORS: ACHING;THROBBING
DESCRIPTORS: ACHING;DISCOMFORT;THROBBING

## 2024-07-29 ASSESSMENT — ACTIVITIES OF DAILY LIVING (ADL): ADL_ASSISTANCE: INDEPENDENT

## 2024-07-29 ASSESSMENT — PAIN DESCRIPTION - LOCATION: LOCATION: FOOT

## 2024-07-29 NOTE — DISCHARGE SUMMARY
Discharge Diagnosis  Diabetic ulcer of toe of left foot associated with diabetes mellitus of other type, unspecified ulcer stage (Multi)    Issues Requiring Follow-Up  -follow up with podiatry next week   -follow up with endocrinology for DM medications and possible restart on her lipid lowering agent   -follow up with PCP     Discharge Meds     Your medication list        START taking these medications        Instructions Last Dose Given Next Dose Due   acetaminophen 325 mg tablet  Commonly known as: Tylenol      Take 3 tablets (975 mg) by mouth every 8 hours if needed for mild pain (1 - 3) for up to 7 days.       gabapentin 300 mg capsule  Commonly known as: Neurontin      Take 1 capsule (300 mg) by mouth 3 times a day.       glipiZIDE 5 mg tablet  Commonly known as: Glucotrol      Take 1 tablet (5 mg) by mouth once daily.       polyethylene glycol 17 gram packet  Commonly known as: Glycolax, Miralax      Take 17 g by mouth once daily.       polyethylene glycol 17 gram packet  Commonly known as: Glycolax, Miralax      Take 17 g by mouth 2 times a day.       sennosides-docusate sodium 8.6-50 mg tablet  Commonly known as: Clara-Colace      Take 2 tablets by mouth 2 times a day.              CHANGE how you take these medications        Instructions Last Dose Given Next Dose Due   oxyCODONE 5 mg immediate release tablet  Commonly known as: Roxicodone  What changed: reasons to take this      Take 1 tablet (5 mg) by mouth every 6 hours if needed for moderate pain (4 - 6) for up to 5 days.              CONTINUE taking these medications        Instructions Last Dose Given Next Dose Due   D3-2000 50 mcg (2,000 unit) capsule  Generic drug: cholecalciferol           DULoxetine 30 mg DR capsule  Commonly known as: Cymbalta           multivitamin tablet           triamterene-hydrochlorothiazid 37.5-25 mg tablet  Commonly known as: Maxzide-25                  STOP taking these medications      ibuprofen 600 mg tablet                   Where to Get Your Medications        You can get these medications from any pharmacy    Bring a paper prescription for each of these medications  oxyCODONE 5 mg immediate release tablet       Information about where to get these medications is not yet available    Ask your nurse or doctor about these medications  acetaminophen 325 mg tablet  gabapentin 300 mg capsule  glipiZIDE 5 mg tablet  polyethylene glycol 17 gram packet  polyethylene glycol 17 gram packet  sennosides-docusate sodium 8.6-50 mg tablet         Test Results Pending At Discharge  Pending Labs       Order Current Status    Surgical Pathology Exam In process    Surgical Pathology Exam In process    Fungal Culture/Smear Preliminary result    Fungal Culture/Smear Preliminary result    Fungal Culture/Smear Preliminary result            Hospital Course  Ramya Muller is a 69 y.o. female with PMH of T2DM (last A1c 10.6 not on any medications), A-fib not on anticoagulation, tubular adenoma of colon, diverticulosis, arthritis, onychomycosis, fibromyalgia, hypertension, GERD on omeprazole, TATO on CPAP, stage 3a CKD, ovarian cancer stage IA s/p DOMINICK/BSO.     The lesion on her left great toe occurred on 7/5 after soaking/scrubbing her feet. She went to ED on 7/6 at  for this. No bone abnormalities were visualized on x-ray and the patient agreed for one dose of IV antibiotics in the ED with discharge on oral clindamycin and pain control with outpatient podiatry consult. The ulceration continued to worsen despite oral clindamycin and she presented to the ED on 7/18 with pain, swelling, ulceration, purulent discharge, and bleeding in the plantar aspect of left great toe.    In the ED, she was started on IV vanc and zosyn. MRI showed osteomyelitis of first proximal and distal phalanx with medial fluid collection likely representing abscess. Podiatry was consulted and evaluated patient. She underwent L hallux amputation on 7/24/24. Wound was left open and  she went back on 7/26/24 for second look and closure. Wound cultures from 7/24/24 showed no organisms. Her vanc and zosyn were discontinued and she was given a 48 hour course of augmentin and doxycycline, which she finished during her hospitalization. She is WBAT on her LLE in surgical boot per podiatry. She will follow up with Dr. Herndon in 1 week. Her dressing is to be left in place until her follow up with podiatry.    For her diabetes, repeat A1c was 8.6 so she was started on sliding scale insulin with minimal requirements while inpatient. She was started on glipizide 5mg at discharge. Lipid panel also showed LDL of 137 but she has a statin allergy (chart review says anaphylaxis, patient is unable to verify) so she was not started on a statin. She was also put on gabapentin 300mg TID. She will follow up with endocrinology.    Pertinent Physical Exam At Time of Discharge  Constitutional: Alert and oriented, NAD  HEENT: NC/AT, no scleral icterus, throat non-erythematous and without lesions  Cardiovascular: RRR, no MRG  Respiratory: CTAB, no wheezes, rales, or rhonchi   Gastrointestinal: abdomen soft, no TTP, bowel sounds normoactive  Skin: warm, well-perfused, no rashes or lesions   Extremities: no lower extremity edema; left foot with dressing and wrap in place    Neuro: no focal deficits  Psych: appropriate affect    Outpatient Follow-Up  Future Appointments   Date Time Provider Department Center   8/7/2024  3:45 PM Zak Caba DPM [id number] [place]   10/14/2024  2:00 PM Gentry Araya MD [id number] [facility]         Emelia Mendes MD

## 2024-07-29 NOTE — PROGRESS NOTES
Per medical team pt is medically ready. [facility] unable to accept pt. [facility] and [facility] SNF able to accept pt. TCC attempted to call dtrMirna to confirm FOC but there was no answer and VM box was full. Willl attempt to call dtr again at a later time.   1211-TCC spoke to pt that stated she was unable to reach her dtr as well so TCC reviewed accepting SNFs with pt and per pt FOC is [facility]. TCC updated SNF to see if able to admit pt today. Medical team aware and will make pt's RN aware as well.   1310-[facility] SNF can admit pt today. Transport confirmed via [facility] for 1700, N2N # is [phone]. Medical team and pt's RN aware.   1501-Per [facility] they checked with [employer] that stated pt's [employer] insurance is primary and not Medi A/B so pt would need auth and so auth started.  Medical team and pt's RN aware. Transport cancelled.

## 2024-07-29 NOTE — CARE PLAN
The patient's goals for the shift include Patient willreceived IV antibiotics as ordered this shift    The clinical goals for the shift include patient will remain free from a fall or injury during this shift    Over the shift, the patient did not make progress toward the following goals. Barriers to progression include . Recommendations to address these barriers include   Problem: Skin  Goal: Decreased wound size/increased tissue granulation at next dressing change  Outcome: Progressing  Goal: Participates in plan/prevention/treatment measures  Outcome: Progressing  Goal: Prevent/manage excess moisture  Outcome: Progressing  Goal: Prevent/minimize sheer/friction injuries  Outcome: Progressing  Goal: Promote/optimize nutrition  Outcome: Progressing  Goal: Promote skin healing  Outcome: Progressing     Problem: Pain  Goal: Takes deep breaths with improved pain control throughout the shift  Outcome: Progressing  Goal: Turns in bed with improved pain control throughout the shift  Outcome: Progressing  Goal: Walks with improved pain control throughout the shift  Outcome: Progressing  Goal: Performs ADL's with improved pain control throughout shift  Outcome: Progressing  Goal: Participates in PT with improved pain control throughout the shift  Outcome: Progressing  Goal: Free from acute confusion related to pain meds throughout the shift  Outcome: Progressing   .

## 2024-07-29 NOTE — PROGRESS NOTES
"  MEDICINE INPATIENT PROGRESS NOTE      Ramya Muller is a 69 y.o. female on day 11 of admission presenting with Diabetic ulcer of toe of left foot associated with diabetes mellitus of other type, unspecified ulcer stage (Multi).    Subjective   Patient seen and examined this morning. Patient resting in bed. Feels well. Awaiting SNF placement.        Objective   Last Recorded Vitals  Blood pressure 121/66, pulse 95, temperature 36.3 °C (97.3 °F), temperature source Tympanic, resp. rate 18, height 1.57 m (5' 1.81\"), weight 88.5 kg (195 lb 1.7 oz), SpO2 98%.  Intake/Output last 3 Shifts:  I/O last 3 completed shifts:  In: - (0 mL/kg)   Out: 250 (2.8 mL/kg) [Urine:250 (0.1 mL/kg/hr)]  Weight: 88.5 kg       Physical Exam  Constitutional: Alert and oriented, NAD  HEENT: NC/AT, no scleral icterus, throat non-erythematous and without lesions  Cardiovascular: RRR, no MRG  Respiratory: CTAB, no wheezes, rales, or rhonchi   Gastrointestinal: abdomen soft, no TTP, bowel sounds normoactive  Skin: warm, well-perfused, no rashes or lesions   Extremities: no lower extremity edema; left foot with dressing and wrap in place    Neuro: no focal deficits  Psych: appropriate affect     Relevant Results  Labs    Results from last 7 days   Lab Units 07/29/24  0912 07/28/24  1142 07/27/24  1122   WBC AUTO x10*3/uL 14.5* 15.4* 15.6*   HEMOGLOBIN g/dL 10.8* 10.5* 11.0*   HEMATOCRIT % 34.3* 33.5* 34.2*   PLATELETS AUTO x10*3/uL 301 273 293            Results from last 7 days   Lab Units 07/27/24  1122 07/23/24  0949   SODIUM mmol/L 136 132*   POTASSIUM mmol/L 3.9 3.6   CO2 mmol/L 30 28   ANION GAP mmol/L 13 14   BUN mg/dL 15 14   CREATININE mg/dL 0.76 0.64   GLUCOSE mg/dL 121* 213*   EGFR mL/min/1.73m*2 86 >90   MAGNESIUM mg/dL 1.95 2.06   PHOSPHORUS mg/dL 3.6 3.7            No lab exists for component: \"BILIT\"     Results from last 7 days   Lab Units 07/24/24  1452   INR  1.1             Medications  Scheduled medications  acetaminophen, " 975 mg, oral, q8h  DULoxetine, 30 mg, oral, Daily  enoxaparin, 40 mg, subcutaneous, q24h  gabapentin, 300 mg, oral, TID  insulin lispro, 0-5 Units, subcutaneous, TID  polyethylene glycol, 17 g, oral, BID  sennosides-docusate sodium, 2 tablet, oral, BID  triamterene-hydrochlorothiazid, 1 tablet, oral, Daily         PRN medications  PRN medications: dextrose, dextrose, eucerin, glucagon, glucagon, HYDROmorphone, oxyCODONE, traMADol     Imaging  Lower extremity venous duplex bilateral    Result Date: 7/19/2024  No sonographic evidence for deep vein thrombosis within the evaluated veins of the bilateral lower extremities.   I personally reviewed the images/study and I agree with the findings as stated by Margarita Ng MD. This study was interpreted at [place], [place].   MACRO: None   Signed by: Giancarlo Castorena 7/19/2024 3:46 PM Dictation workstation:   [id number]    MR foot left wo IV contrast    Result Date: 7/19/2024  1. 1st interphalangeal region soft tissue ulcer with subjacent osteomyelitis of the 1st distal phalanx and distal aspect of 1st proximal phalanx. There is an additional medial fluid collection suspected with internal soft tissue gas which may reflect a tiny superficial abscess. 2. Increased T2 signal throughout the subcutaneous soft tissues of the visualized lower extremity, consistent with generalized edema and/or cellulitis. 3. 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   I personally reviewed the images/study and I agree with the findings as stated. This study was interpreted at [place], [place].   MACRO: None   Signed by: Turner Salcedo 7/19/2024 9:19 AM Dictation workstation:   [id number]    XR foot left 3+ views    Result Date: 7/18/2024  1. Findings most compatible with osteomyelitis of the 1st toe with overlying wound/ulcer on the medial side of the toe. 2. Findings  which may represent osteomyelitis of the head of the proximal phalanx of the 1st digit. 3. Soft tissue gas in the 1st toe for which differential considerations include sequelae of open nature of process gas-forming infection and/or any recent intervention. 4. MRI is recommended for further assessment.     I personally reviewed the images/study and I agree with the findings as stated. This study was interpreted at [facility], [place], [place].   MACRO: None   Signed by: Smooth Espinoza 7/18/2024 1:16 PM Dictation workstation:   [id number]    XR foot left 3+ views    Result Date: 7/7/2024  No acute osseous abnormality.  Moderate dorsal soft tissue swelling Signed by Dwight Villa MD          Assessment/Plan   Ramya Muller is a 69 y.o. female with poorly controlled T2DM (A1c 8%, 4/15/2024) initially treated for a diabetic foot soft tissue infection of the L 1st digit now presenting with worsening pain and with nonhealing ulcer that probes to bone with MRI L foot confirming osteomyelitis of L great toe with involvement of distal metatarsal w/o fluid collection (septic arthritis w/o drainable pocket). Underwent L hallux amputation with podiatry on 7/24/24.     Updates 7/29  - awaiting SNF placement - insurance issues delaying transfer to SNF    #L 1st digit diabetic ulcer  #Osteomyelitis  #Septic arthritis w/o drainable pocket  PLAN  -L hallux wound closure in OR 7/26/24, patient is WBAT on LLE in surgical boot per podiatry, will F/U with Dr. Herndon in 1 week  -Patient and family agreed to go to SNF    #T2DM c/b neuropathy  A1c 8.6%; BG WNL  PLAN  -SSI #1 for now, requiring 0-2 units per day  -hypoglycemia protocol  -Endo and PCP OP follow up on discharge  -Initiate antihyperglycemic agent on discharge: glipizide 5mg daily, patient not interested in GLP1 at this time  -ASCVD risk at least 13.3%; patient has reported anaphylaxis to statins  -Lipid panel ordered;       #Fibromyalgia and diabetic neuropathy  -C/w home Duloxetine 30 mg daily  -gabapentin 300mg TID     #AFIB, resolved  -Chart diagnosis. Patient is unaware of this diagnosis. No record of rate/rhythm control meds. EKG normal, no palpitations      #Lower leg cramps  -Iron profile c/w ACD; consider iron repletion for RLS tx when infection resolves  -DVT US BLE 7/19 negative    #ACD  -Iron studies consistent with anemia of chronic disease (iron low, transferrin WNL; ferritin elevated)  PLAN  -See above for diabetic OM     F: prn  E: prn  N: regular diet   A: PIV  DVT: lovenox  GI: not indicated     Bowel Regimen: miralax azeem BID, doc-senna 2 tabs BID   Code Status: Full code (confirmed on admission)  RASHID: Jennifer (daughter): [phone]      Michael Leon MD   Internal Medicine, PGY-3  [facility]

## 2024-07-29 NOTE — PROGRESS NOTES
Occupational Therapy    Evaluation    Patient Name: Ramya Muller  MRN: [id number]  Today's Date: 7/29/2024  Room: 2067/2067A  Time Calculation  Start Time: 1428  Stop Time: 1441  Time Calculation (min): 13 min    Assessment  IP OT Assessment  End of Session Communication: Bedside nurse  End of Session Patient Position: Bed, 2 rail up, Alarm off, not on at start of session  Plan:  Inpatient Plan  OT Frequency: 3 times per week  OT Discharge Recommendations: Moderate intensity level of continued care  Equipment Recommended upon Discharge:  (Tub bench, walker, Reacher)  OT - OK to Discharge:  (OT Eval completed.)  OT Assessment  OT Assessment Results: Decreased ADL status, Decreased endurance, Decreased functional mobility, Decreased IADLs, Decreased trunk control for functional activities, Decreased safe judgment during ADL    Subjective   Current Problem:  1. Diabetic ulcer of toe of left foot associated with diabetes mellitus of other type, unspecified ulcer stage (Multi)  Referral to Podiatry    Vascular US Ankle Brachial Index (ALLI) Without Exercise    Vascular US Ankle Brachial Index (ALLI) Without Exercise    Referral to Home Health    Walker rolling    acetaminophen (Tylenol) 325 mg tablet    oxyCODONE (Roxicodone) 5 mg immediate release tablet    DISCONTINUED: oxyCODONE (Roxicodone) 5 mg immediate release tablet    CANCELED: Vascular US PVR without exercise    CANCELED: Vascular US PVR without exercise    CANCELED: Referral to Home Health    CANCELED: Referral to Home Health      2. Other osteomyelitis of left foot (Multi)  Referral to Podiatry    Case Request Operating Room: Amputation Digit Foot    Case Request Operating Room: Amputation Digit Foot    Surgical Pathology Exam    Surgical Pathology Exam    Fungal Culture/Smear    Fungal Culture/Smear    Tissue/Wound Culture/Smear    Tissue/Wound Culture/Smear    Fungal Culture/Smear    Fungal Culture/Smear    Tissue/Wound Culture/Smear    Tissue/Wound  Culture/Smear    Case Request Operating Room: Debridement Lower Extremity    Case Request Operating Room: Debridement Lower Extremity    Vascular US Ankle Brachial Index (ALLI) Without Exercise    Vascular US Ankle Brachial Index (ALLI) Without Exercise    Surgical Pathology Exam    Surgical Pathology Exam    Fungal Culture/Smear    Fungal Culture/Smear    Tissue/Wound Culture/Smear    Tissue/Wound Culture/Smear    Referral to Healthy at Home Program    polyethylene glycol (Glycolax, Miralax) 17 gram packet    sennosides-docusate sodium (Clara-Colace) 8.6-50 mg tablet    CANCELED: Vascular US PVR without exercise    CANCELED: Vascular US PVR without exercise      3. Deep vein thrombosis (DVT) of calf muscle vein of both lower extremities, unspecified chronicity (Multi)  Lower extremity venous duplex bilateral    Lower extremity venous duplex bilateral      4. Type 2 diabetes mellitus with other specified complication, without long-term current use of insulin (Multi)  Referral to Primary Care    Referral to Endocrinology    glipiZIDE (Glucotrol) 5 mg tablet    DISCONTINUED: SITagliptin phosphate (Januvia) 100 mg tablet      5. Constipation, unspecified constipation type  polyethylene glycol (Glycolax, Miralax) 17 gram packet    DISCONTINUED: polyethylene glycol (Glycolax, Miralax) 17 gram packet      6. Type 2 diabetes mellitus with diabetic peripheral angiopathy without gangrene, without long-term current use of insulin (Multi)  Vascular US Ankle Brachial Index (ALLI) Without Exercise    Vascular US Ankle Brachial Index (ALLI) Without Exercise    Referral to Healthy at Home Program    gabapentin (Neurontin) 300 mg capsule    DISCONTINUED: dulaglutide (Trulicity) 0.75 mg/0.5 mL pen injector    CANCELED: Vascular US PVR without exercise    CANCELED: Vascular US PVR without exercise      7. Debility  Walker rolling        General:  Reason for Referral: (L) great toe OM s/p hallux amputation 7/24. 7/26 s/p left LE hullux  "wound closure  Past Medical History Relevant to Rehab: DM, neuropathy, Afib, colon CA, fibromyalgia, HTN, ovarian CA  Prior to Session Communication: Bedside nurse  Patient Position Received: Alarm off, not on at start of session, Bed, 2 rail up  Family/Caregiver Present: No  General Comment: Pt received in left sidelying, requesting assistance placing items in bed into a bag, pt noted to be laying on several of the personal items. Agreealbe to OT.   Precautions:  LE Weight Bearing Status: Weight Bearing as Tolerated (Per Podiatry 7/27/24: \"WBAT In surgical boot, instructed pt to limit activity\".)  Medical Precautions: Fall precautions  Vital Signs:     Pain:  Pain Assessment  Pain Assessment: 0-10  0-10 (Numeric) Pain Score:  (Pt c/o consipation discomfort (CTA made aware), pain not rated. Pt stated she did not have pain in her left foot as long as she was wearing the surgical shoe.)         Objective   Cognition:  Orientation Level: Oriented X4  Safety Judgment: Decreased awareness of need for safety precautions  Safety/Judgement:  (Decreased safety awareness with mobility tasks, required cues to modify mobility and turning to sit on bed. Despite cues for safety, Pt approached bed by facing it and placing right knee onto bed to climb in on hands/knees, pt fell onto bed in unsafely.)  Insight: Mild           Home Living:  Type of Home: House  Lives With: Alone  Home Adaptive Equipment: None  Home Layout: Stairs to alternate level with rails, Bed/bath upstairs  Alternate Level Stairs-Number of Steps: 12  Home Access: Stairs to enter with rails  Entrance Stairs-Number of Steps:  (\"A few\")  Bathroom Shower/Tub: Tub/shower unit   Prior Function:  Level of [place]: Independent with ADLs and functional transfers  ADL Assistance: Independent  Ambulatory Assistance: Independent (in community without AD.)       ADL:  UE Dressing Assistance:  (Anticipate SBA after setup.)  LE Dressing Assistance: Minimal (Pt with noted " difficulty adjusting placement of right surgical shoe, OT assisted in task to maximize optimal fit. CGA for management of clothing while in standing.)  Toileting Assistance with Device:  (Anticipate CGA based upon pt's functional performance during other tasks in session.)  Activity Tolerance:  Endurance: Tolerates 10 - 20 min exercise with multiple rests  Balance:  Static Sitting Balance  Static Sitting-Level of Assistance: Close supervision  Static Standing Balance  Static Standing-Comment/Number of Minutes: CGA with RW  Bed Mobility/Transfers: Bed Mobility  Bed Mobility: Yes  Bed Mobility 1  Bed Mobility 1: Supine to sitting  Level of Assistance 1: Contact guard  Bed Mobility Comments 1:  (HOB elevated., Pt completed task with increased noted time/pt effort.)  Bed Mobility 2  Bed Mobility  2: Sitting to supine  Level of Assistance 2: Minimum assistance, Moderate verbal cues (Pt with poor safety awareness despite cues for safe technique, Pt lost balance onto bed during task with min assist. Refer to above details re: pt's technique for attempting to return to bed despite verbal/tactile cues for safety. TBE further.)  Functional Mobility  Functional Mobility Performed: Yes  Functional Mobility 1  Surface 1: Level tile  Device 1: Rolling walker  Assistance 1: Contact guard  Quality of Functional Mobility 1:  (Step to and antalgic gait noted. Refer to PT notes for gait details/distance.)  Comments 1: Pt requested to mobilize into hallway, ambulated approximately 50'.   and Transfers  Transfer: Yes  Transfer 1  Transfer From 1: Bed to  Transfer to 1: Stand  Technique 1: Sit to stand, Stand to sit  Transfer Device 1: Walker  Transfer Level of Assistance 1: Minimum assistance  Trials/Comments 1: Cues for safe hand placement in task.  Transfers 2  Transfer From 2: Stand to  Transfer to 2: Sit  Technique 2: Stand to sit  Transfer Device 2: Walker  Transfer Level of Assistance 2: Minimum assistance (2' pt's unsafe  technique despite verbal/tactile cues. TBE further.)       Vision: Vision - Basic Assessment  Current Vision: No visual deficits   and    Sensation:  Light Touch: No apparent deficits (UE's)  Strength:  Strength Comments: Grossly WFL for basic ADL's as observed duuring functional tasks in session.  Perception:  Inattention/Neglect: Appears intact    Hand Function:  Hand Function  Gross Grasp: Functional  Extremities: RUE   RUE : Within Functional Limits, LUE   LUE: Within Functional Limits,  , and      Outcome Measures: [facility] Daily Activity  Putting on and taking off regular lower body clothing: A little  Bathing (including washing, rinsing, drying): A little  Putting on and taking off regular upper body clothing: A little  Toileting, which includes using toilet, bedpan or urinal: A little  Taking care of personal grooming such as brushing teeth: A little  Eating Meals: A little  Daily Activity - Total Score: 18         ,     OT Adult Other Outcome Measures  4AT: 0    Education Documentation  Body Mechanics, taught by Cuca Brewer OT at 7/29/2024  3:07 PM.  Learner: Patient  Readiness: Acceptance  Method: Explanation  Response: Needs Reinforcement    Precautions, taught by Cuca Brewer OT at 7/29/2024  3:07 PM.  Learner: Patient  Readiness: Acceptance  Method: Explanation  Response: Needs Reinforcement    ADL Training, taught by Cuca Brewer OT at 7/29/2024  3:07 PM.  Learner: Patient  Readiness: Acceptance  Method: Explanation  Response: Needs Reinforcement    Education Comments  No comments found.        Goals:     Encounter Problems       Encounter Problems (Active)       ADLs       Patient will perform UB and LB bathing  with modified independent level of assistance and extended tub bench and long-handled sponge. (Progressing)       Start:  07/29/24    Expected End:  08/12/24            Patient with complete upper body dressing with independent level of assistance donning and doffing all UE clothes with no  adaptive equipment while supported sitting and edge of bed  (Progressing)       Start:  07/29/24    Expected End:  08/12/24            Patient with complete lower body dressing with modified independent level of assistance donning and doffing pants without a zipper/fasteners, underwear, socks, and shoes with reacher, shoe horn, and sock-aid while supported sitting and edge of bed  (Progressing)       Start:  07/29/24    Expected End:  08/12/24            Patient will complete daily grooming tasks brushing teeth and washing face/hair with modified independent level of assistance and PRN adaptive equipment while standing and/or EOB. (Progressing)       Start:  07/29/24    Expected End:  08/12/24            Patient will complete toileting including hygiene clothing management/hygiene with modified independent level of assistance and raised toilet seat. (Progressing)       Start:  07/29/24    Expected End:  08/12/24               MOBILITY       Patient will perform Functional mobility  Household distances/Community Distances with modified independent level of assistance and least restrictive device in order to improve safety and functional mobility. (Progressing)       Start:  07/29/24    Expected End:  08/12/24               TRANSFERS       Patient will perform bed mobility independent level of assistance and using safe technique in order to improve safety and independence with mobility (Progressing)       Start:  07/29/24    Expected End:  08/12/24            Patient will complete functional transfer to all surfaces with least restrictive device with modified independent level of assistance. (Progressing)       Start:  07/29/24    Expected End:  08/12/24 07/29/24 at 3:08 PM   Cuca Brewer OT   Rehab Office: [phone]

## 2024-07-29 NOTE — CARE PLAN
The patient's goals for the shift include Patient willreceived IV antibiotics as ordered this shift    The clinical goals for the shift include Pt. will verbalize improved pain by end of shift    Over the shift, the patient did not make progress toward the following goals. Barriers to progression include. Recommendations to address these barriers include.    Problem: Pain  Goal: Takes deep breaths with improved pain control throughout the shift  Outcome: Progressing  Goal: Turns in bed with improved pain control throughout the shift  Outcome: Progressing  Goal: Walks with improved pain control throughout the shift  Outcome: Progressing  Goal: Performs ADL's with improved pain control throughout shift  Outcome: Progressing  Goal: Participates in PT with improved pain control throughout the shift  Outcome: Progressing  Goal: Free from acute confusion related to pain meds throughout the shift  Outcome: Progressing     Problem: Skin  Goal: Decreased wound size/increased tissue granulation at next dressing change  Outcome: Progressing  Goal: Participates in plan/prevention/treatment measures  Outcome: Progressing  Goal: Prevent/manage excess moisture  Outcome: Progressing  Goal: Prevent/minimize sheer/friction injuries  Outcome: Progressing  Goal: Promote/optimize nutrition  Outcome: Progressing  Goal: Promote skin healing  Outcome: Progressing

## 2024-07-30 VITALS
HEART RATE: 97 BPM | TEMPERATURE: 97.9 F | OXYGEN SATURATION: 98 % | DIASTOLIC BLOOD PRESSURE: 79 MMHG | RESPIRATION RATE: 16 BRPM | SYSTOLIC BLOOD PRESSURE: 142 MMHG | BODY MASS INDEX: 35.9 KG/M2 | WEIGHT: 195.11 LBS | HEIGHT: 62 IN

## 2024-07-30 LAB
GLUCOSE BLD MANUAL STRIP-MCNC: 140 MG/DL (ref 74–99)
GLUCOSE BLD MANUAL STRIP-MCNC: 173 MG/DL (ref 74–99)

## 2024-07-30 PROCEDURE — 2500000001 HC RX 250 WO HCPCS SELF ADMINISTERED DRUGS (ALT 637 FOR MEDICARE OP)

## 2024-07-30 PROCEDURE — 2500000002 HC RX 250 W HCPCS SELF ADMINISTERED DRUGS (ALT 637 FOR MEDICARE OP, ALT 636 FOR OP/ED)

## 2024-07-30 PROCEDURE — 99239 HOSP IP/OBS DSCHRG MGMT >30: CPT

## 2024-07-30 PROCEDURE — 82947 ASSAY GLUCOSE BLOOD QUANT: CPT

## 2024-07-30 RX ORDER — LACTULOSE 10 G/15ML
10 SOLUTION ORAL AS NEEDED
Status: DISCONTINUED | OUTPATIENT
Start: 2024-07-30 | End: 2024-07-30 | Stop reason: HOSPADM

## 2024-07-30 ASSESSMENT — COGNITIVE AND FUNCTIONAL STATUS - GENERAL
HELP NEEDED FOR BATHING: A LITTLE
WALKING IN HOSPITAL ROOM: A LITTLE
DRESSING REGULAR UPPER BODY CLOTHING: A LITTLE
PERSONAL GROOMING: A LITTLE
CLIMB 3 TO 5 STEPS WITH RAILING: A LITTLE
STANDING UP FROM CHAIR USING ARMS: A LITTLE
EATING MEALS: A LITTLE
TOILETING: A LITTLE
DRESSING REGULAR LOWER BODY CLOTHING: A LITTLE

## 2024-07-30 ASSESSMENT — PAIN SCALES - GENERAL
PAINLEVEL_OUTOF10: 0 - NO PAIN
PAINLEVEL_OUTOF10: 8
PAINLEVEL_OUTOF10: 6

## 2024-07-30 ASSESSMENT — PAIN - FUNCTIONAL ASSESSMENT
PAIN_FUNCTIONAL_ASSESSMENT: 0-10

## 2024-07-30 ASSESSMENT — PAIN DESCRIPTION - DESCRIPTORS
DESCRIPTORS: ACHING;DISCOMFORT
DESCRIPTORS: ACHING;DISCOMFORT

## 2024-07-30 NOTE — CARE PLAN
Patient remained safe and free from falls and/or injury this shift. No nausea noted by this RN. Patient complained of pain to the left lower extremity as well as rectum this shift. Medicated per EMR.     The clinical goals for the shift include patient will have a bowel movement by the end of this shift. Patient with issues of constipation over this shift. One time order for a suppository was administered to the patient along with current bowel regimen. Patient continued not to have a bowel movement with elevated pain. Provider called to the bedside at this time to evaluate. Patient received one tap water enema and a dose of lactulose. Pending bowel movement at this time. Last bowel movement was 07/28/2024.    Patient plan of care ongoing. Patient pending discharge today, 07/30/2024 to SNF.     Caren VILLASENOR, RN, CMSRN      Problem: Skin  Goal: Decreased wound size/increased tissue granulation at next dressing change  Outcome: Progressing  Flowsheets (Taken 7/30/2024 0206)  Decreased wound size/increased tissue granulation at next dressing change:   Promote sleep for wound healing   Protective dressings over bony prominences   Utilize specialty bed per algorithm  Goal: Participates in plan/prevention/treatment measures  Outcome: Progressing  Flowsheets (Taken 7/30/2024 0206)  Participates in plan/prevention/treatment measures:   Discuss with provider PT/OT consult   Elevate heels   Increase activity/out of bed for meals  Goal: Prevent/manage excess moisture  Outcome: Progressing  Flowsheets (Taken 7/30/2024 0206)  Prevent/manage excess moisture:   Cleanse incontinence/protect with barrier cream   Follow provider orders for dressing changes   Moisturize dry skin   Monitor for/manage infection if present  Goal: Prevent/minimize sheer/friction injuries  Outcome: Progressing  Flowsheets (Taken 7/30/2024 0206)  Prevent/minimize sheer/friction injuries:   Complete micro-shifts as needed if patient unable. Adjust  patient position to relieve pressure points, not a full turn   Increase activity/out of bed for meals   Use pull sheet   HOB 30 degrees or less   Turn/reposition every 2 hours/use positioning/transfer devices   Utilize specialty bed per algorithm  Goal: Promote/optimize nutrition  Outcome: Progressing  Flowsheets (Taken 7/30/2024 0206)  Promote/optimize nutrition:   Assist with feeding   Monitor/record intake including meals   Consume > 50% meals/supplements   Offer water/supplements/favorite foods   Discuss with provider if NPO > 2 days   Reassess MST if dietician not consulted  Goal: Promote skin healing  Outcome: Progressing  Flowsheets (Taken 7/30/2024 0206)  Promote skin healing:   Assess skin/pad under line(s)/device(s)   Protective dressings over bony prominences   Turn/reposition every 2 hours/use positioning/transfer devices   Ensure correct size (line/device) and apply per  instructions   Rotate device position/do not position patient on device     Problem: Pain  Goal: Takes deep breaths with improved pain control throughout the shift  Outcome: Progressing  Goal: Turns in bed with improved pain control throughout the shift  Outcome: Progressing  Goal: Walks with improved pain control throughout the shift  Outcome: Progressing  Goal: Performs ADL's with improved pain control throughout shift  Outcome: Progressing  Goal: Participates in PT with improved pain control throughout the shift  Outcome: Progressing  Goal: Free from acute confusion related to pain meds throughout the shift  Outcome: Progressing     Problem: Pain - Adult  Goal: Verbalizes/displays adequate comfort level or baseline comfort level  Outcome: Progressing     Problem: Safety - Adult  Goal: Free from fall injury  Outcome: Progressing     Problem: Discharge Planning  Goal: Discharge to home or other facility with appropriate resources  Outcome: Progressing     Problem: Chronic Conditions and Co-morbidities  Goal: Patient's  chronic conditions and co-morbidity symptoms are monitored and maintained or improved  Outcome: Progressing     Problem: Diabetes  Goal: Achieve decreasing blood glucose levels by end of shift  Outcome: Progressing  Goal: Increase stability of blood glucose readings by end of shift  Outcome: Progressing  Goal: Decrease in ketones present in urine by end of shift  Outcome: Progressing  Goal: Maintain electrolyte levels within acceptable range throughout shift  Outcome: Progressing  Goal: Maintain glucose levels >70mg/dl to <250mg/dl throughout shift  Outcome: Progressing  Goal: No changes in neurological exam by end of shift  Outcome: Progressing  Goal: Learn about and adhere to nutrition recommendations by end of shift  Outcome: Progressing  Goal: Vital signs within normal range for age by end of shift  Outcome: Progressing  Goal: Increase self care and/or family involovement by end of shift  Outcome: Progressing  Goal: Receive DSME education by end of shift  Outcome: Progressing

## 2024-07-30 NOTE — PROGRESS NOTES
Per [facility] pt has auth and can admit today. Transport confirmed via [facility] for 2 pm. Medical team and pt's RN aware.

## 2024-08-01 ENCOUNTER — LAB REQUISITION (OUTPATIENT)
Dept: LAB | Facility: HOSPITAL | Age: 69
End: 2024-08-01

## 2024-08-01 DIAGNOSIS — E11.9 TYPE 2 DIABETES MELLITUS WITHOUT COMPLICATIONS (MULTI): ICD-10-CM

## 2024-08-01 LAB
ANION GAP SERPL CALC-SCNC: 14 MMOL/L (ref 10–20)
BUN SERPL-MCNC: 18 MG/DL (ref 6–23)
CALCIUM SERPL-MCNC: 8.8 MG/DL (ref 8.6–10.3)
CHLORIDE SERPL-SCNC: 98 MMOL/L (ref 98–107)
CO2 SERPL-SCNC: 27 MMOL/L (ref 21–32)
CREAT SERPL-MCNC: 0.65 MG/DL (ref 0.5–1.05)
EGFRCR SERPLBLD CKD-EPI 2021: >90 ML/MIN/1.73M*2
ERYTHROCYTE [DISTWIDTH] IN BLOOD BY AUTOMATED COUNT: 11.9 % (ref 11.5–14.5)
FUNGUS SPEC CULT: NORMAL
FUNGUS SPEC FUNGUS STN: NORMAL
GLUCOSE SERPL-MCNC: 119 MG/DL (ref 74–99)
HCT VFR BLD AUTO: 33.6 % (ref 36–46)
HGB BLD-MCNC: 11.1 G/DL (ref 12–16)
MCH RBC QN AUTO: 26.5 PG (ref 26–34)
MCHC RBC AUTO-ENTMCNC: 33 G/DL (ref 32–36)
MCV RBC AUTO: 80 FL (ref 80–100)
NRBC BLD-RTO: 0 /100 WBCS (ref 0–0)
PLATELET # BLD AUTO: 311 X10*3/UL (ref 150–450)
POTASSIUM SERPL-SCNC: 3.8 MMOL/L (ref 3.5–5.3)
RBC # BLD AUTO: 4.19 X10*6/UL (ref 4–5.2)
SODIUM SERPL-SCNC: 135 MMOL/L (ref 136–145)
WBC # BLD AUTO: 10.7 X10*3/UL (ref 4.4–11.3)

## 2024-08-01 PROCEDURE — 82374 ASSAY BLOOD CARBON DIOXIDE: CPT | Mod: OUT | Performed by: FAMILY MEDICINE

## 2024-08-01 PROCEDURE — 85027 COMPLETE CBC AUTOMATED: CPT | Mod: OUT | Performed by: FAMILY MEDICINE

## 2024-08-05 ENCOUNTER — LAB REQUISITION (OUTPATIENT)
Dept: LAB | Facility: HOSPITAL | Age: 69
End: 2024-08-05

## 2024-08-05 DIAGNOSIS — E11.9 TYPE 2 DIABETES MELLITUS WITHOUT COMPLICATIONS (MULTI): ICD-10-CM

## 2024-08-05 LAB
ANION GAP SERPL CALC-SCNC: 13 MMOL/L (ref 10–20)
BUN SERPL-MCNC: 13 MG/DL (ref 6–23)
CALCIUM SERPL-MCNC: 9 MG/DL (ref 8.6–10.3)
CHLORIDE SERPL-SCNC: 99 MMOL/L (ref 98–107)
CO2 SERPL-SCNC: 28 MMOL/L (ref 21–32)
CREAT SERPL-MCNC: 0.6 MG/DL (ref 0.5–1.05)
EGFRCR SERPLBLD CKD-EPI 2021: >90 ML/MIN/1.73M*2
ERYTHROCYTE [DISTWIDTH] IN BLOOD BY AUTOMATED COUNT: 11.9 % (ref 11.5–14.5)
FUNGUS SPEC CULT: NORMAL
FUNGUS SPEC FUNGUS STN: NORMAL
GLUCOSE SERPL-MCNC: 116 MG/DL (ref 74–99)
HCT VFR BLD AUTO: 35.7 % (ref 36–46)
HGB BLD-MCNC: 11.6 G/DL (ref 12–16)
MCH RBC QN AUTO: 26.4 PG (ref 26–34)
MCHC RBC AUTO-ENTMCNC: 32.5 G/DL (ref 32–36)
MCV RBC AUTO: 81 FL (ref 80–100)
NRBC BLD-RTO: 0 /100 WBCS (ref 0–0)
PLATELET # BLD AUTO: 318 X10*3/UL (ref 150–450)
POTASSIUM SERPL-SCNC: 3.9 MMOL/L (ref 3.5–5.3)
RBC # BLD AUTO: 4.4 X10*6/UL (ref 4–5.2)
SODIUM SERPL-SCNC: 136 MMOL/L (ref 136–145)
WBC # BLD AUTO: 8.4 X10*3/UL (ref 4.4–11.3)

## 2024-08-05 PROCEDURE — 82374 ASSAY BLOOD CARBON DIOXIDE: CPT | Mod: OUT | Performed by: FAMILY MEDICINE

## 2024-08-05 PROCEDURE — 85027 COMPLETE CBC AUTOMATED: CPT | Mod: OUT | Performed by: FAMILY MEDICINE

## 2024-08-06 LAB
LABORATORY COMMENT REPORT: NORMAL
LABORATORY COMMENT REPORT: NORMAL
PATH REPORT.COMMENTS IMP SPEC: NORMAL
PATH REPORT.FINAL DX SPEC: NORMAL
PATH REPORT.FINAL DX SPEC: NORMAL
PATH REPORT.GROSS SPEC: NORMAL
PATH REPORT.GROSS SPEC: NORMAL
PATH REPORT.RELEVANT HX SPEC: NORMAL
PATH REPORT.RELEVANT HX SPEC: NORMAL
PATH REPORT.TOTAL CANCER: NORMAL
PATH REPORT.TOTAL CANCER: NORMAL

## 2024-08-07 ENCOUNTER — APPOINTMENT (OUTPATIENT)
Dept: PODIATRY | Facility: CLINIC | Age: 69
End: 2024-08-07
Payer: COMMERCIAL

## 2024-08-12 ENCOUNTER — APPOINTMENT (OUTPATIENT)
Dept: PODIATRY | Facility: CLINIC | Age: 69
End: 2024-08-12
Payer: MEDICARE

## 2024-08-12 ENCOUNTER — LAB REQUISITION (OUTPATIENT)
Dept: LAB | Facility: HOSPITAL | Age: 69
End: 2024-08-12

## 2024-08-12 DIAGNOSIS — M86.9 OSTEOMYELITIS, UNSPECIFIED (MULTI): ICD-10-CM

## 2024-08-12 LAB
ANION GAP SERPL CALC-SCNC: 12 MMOL/L (ref 10–20)
BUN SERPL-MCNC: 17 MG/DL (ref 6–23)
CALCIUM SERPL-MCNC: 8.9 MG/DL (ref 8.6–10.3)
CHLORIDE SERPL-SCNC: 103 MMOL/L (ref 98–107)
CO2 SERPL-SCNC: 28 MMOL/L (ref 21–32)
CREAT SERPL-MCNC: 0.6 MG/DL (ref 0.5–1.05)
EGFRCR SERPLBLD CKD-EPI 2021: >90 ML/MIN/1.73M*2
ERYTHROCYTE [DISTWIDTH] IN BLOOD BY AUTOMATED COUNT: 12.4 % (ref 11.5–14.5)
FUNGUS SPEC CULT: NORMAL
FUNGUS SPEC FUNGUS STN: NORMAL
GLUCOSE SERPL-MCNC: 112 MG/DL (ref 74–99)
HCT VFR BLD AUTO: 36 % (ref 36–46)
HGB BLD-MCNC: 11.5 G/DL (ref 12–16)
MCH RBC QN AUTO: 26.1 PG (ref 26–34)
MCHC RBC AUTO-ENTMCNC: 31.9 G/DL (ref 32–36)
MCV RBC AUTO: 82 FL (ref 80–100)
NRBC BLD-RTO: 0 /100 WBCS (ref 0–0)
PLATELET # BLD AUTO: 270 X10*3/UL (ref 150–450)
POTASSIUM SERPL-SCNC: 4 MMOL/L (ref 3.5–5.3)
RBC # BLD AUTO: 4.4 X10*6/UL (ref 4–5.2)
SODIUM SERPL-SCNC: 139 MMOL/L (ref 136–145)
WBC # BLD AUTO: 7.5 X10*3/UL (ref 4.4–11.3)

## 2024-08-12 PROCEDURE — 85027 COMPLETE CBC AUTOMATED: CPT | Mod: OUT | Performed by: FAMILY MEDICINE

## 2024-08-12 PROCEDURE — 82374 ASSAY BLOOD CARBON DIOXIDE: CPT | Mod: OUT | Performed by: FAMILY MEDICINE

## 2024-09-12 ENCOUNTER — HOSPITAL ENCOUNTER (OUTPATIENT)
Facility: HOSPITAL | Age: 69
Setting detail: OUTPATIENT SURGERY
End: 2024-09-12
Attending: PODIATRIST | Admitting: PODIATRIST
Payer: MEDICARE

## 2024-09-12 ENCOUNTER — PREP FOR PROCEDURE (OUTPATIENT)
Dept: PODIATRY | Facility: HOSPITAL | Age: 69
End: 2024-09-12
Payer: MEDICARE

## 2024-09-12 ENCOUNTER — HOSPITAL ENCOUNTER (OUTPATIENT)
Dept: CARDIOLOGY | Facility: HOSPITAL | Age: 69
Discharge: HOME | End: 2024-09-12
Payer: COMMERCIAL

## 2024-09-12 DIAGNOSIS — L97.422 NON-PRESSURE CHRONIC ULCER OF LEFT HEEL AND MIDFOOT WITH FAT LAYER EXPOSED (MULTI): Primary | ICD-10-CM

## 2024-09-12 DIAGNOSIS — E11.49 TYPE II OR UNSPECIFIED TYPE DIABETES MELLITUS WITH NEUROLOGICAL MANIFESTATIONS, NOT STATED AS UNCONTROLLED(250.60) (MULTI): ICD-10-CM

## 2024-09-12 DIAGNOSIS — L97.422 NON-PRESSURE CHRONIC ULCER OF LEFT HEEL AND MIDFOOT WITH FAT LAYER EXPOSED (MULTI): ICD-10-CM

## 2024-09-12 RX ORDER — SODIUM CHLORIDE, SODIUM LACTATE, POTASSIUM CHLORIDE, CALCIUM CHLORIDE 600; 310; 30; 20 MG/100ML; MG/100ML; MG/100ML; MG/100ML
20 INJECTION, SOLUTION INTRAVENOUS CONTINUOUS
OUTPATIENT
Start: 2024-09-12

## 2024-09-23 ENCOUNTER — LAB (OUTPATIENT)
Dept: LAB | Facility: LAB | Age: 69
End: 2024-09-23
Payer: MEDICARE

## 2024-09-23 ENCOUNTER — APPOINTMENT (OUTPATIENT)
Dept: PREADMISSION TESTING | Facility: HOSPITAL | Age: 69
End: 2024-09-23
Payer: MEDICARE

## 2024-09-23 ENCOUNTER — PRE-ADMISSION TESTING (OUTPATIENT)
Dept: PREADMISSION TESTING | Facility: HOSPITAL | Age: 69
End: 2024-09-23
Payer: MEDICARE

## 2024-09-23 ENCOUNTER — ANESTHESIA EVENT (OUTPATIENT)
Dept: OPERATING ROOM | Facility: HOSPITAL | Age: 69
End: 2024-09-23

## 2024-09-23 ENCOUNTER — DOCUMENTATION (OUTPATIENT)
Dept: PREADMISSION TESTING | Facility: HOSPITAL | Age: 69
End: 2024-09-23

## 2024-09-23 VITALS
HEART RATE: 100 BPM | WEIGHT: 194 LBS | HEIGHT: 62 IN | RESPIRATION RATE: 18 BRPM | DIASTOLIC BLOOD PRESSURE: 76 MMHG | SYSTOLIC BLOOD PRESSURE: 133 MMHG | OXYGEN SATURATION: 97 % | TEMPERATURE: 97.9 F | BODY MASS INDEX: 35.7 KG/M2

## 2024-09-23 DIAGNOSIS — E08.8 DIABETES MELLITUS DUE TO UNDERLYING CONDITION WITH UNSPECIFIED COMPLICATIONS (MULTI): ICD-10-CM

## 2024-09-23 DIAGNOSIS — Z01.818 PREPROCEDURAL EXAMINATION: ICD-10-CM

## 2024-09-23 DIAGNOSIS — E11.51 TYPE 2 DIABETES MELLITUS WITH DIABETIC PERIPHERAL ANGIOPATHY WITHOUT GANGRENE, WITHOUT LONG-TERM CURRENT USE OF INSULIN (MULTI): Primary | ICD-10-CM

## 2024-09-23 DIAGNOSIS — E11.49 TYPE II OR UNSPECIFIED TYPE DIABETES MELLITUS WITH NEUROLOGICAL MANIFESTATIONS, NOT STATED AS UNCONTROLLED(250.60) (MULTI): ICD-10-CM

## 2024-09-23 DIAGNOSIS — E11.51 TYPE 2 DIABETES MELLITUS WITH DIABETIC PERIPHERAL ANGIOPATHY WITHOUT GANGRENE, WITHOUT LONG-TERM CURRENT USE OF INSULIN (MULTI): ICD-10-CM

## 2024-09-23 DIAGNOSIS — L97.422 NON-PRESSURE CHRONIC ULCER OF LEFT HEEL AND MIDFOOT WITH FAT LAYER EXPOSED (MULTI): ICD-10-CM

## 2024-09-23 PROBLEM — I48.91 ATRIAL FIBRILLATION (MULTI): Status: ACTIVE | Noted: 2022-06-30

## 2024-09-23 PROBLEM — E78.2 MIXED HYPERLIPIDEMIA: Status: ACTIVE | Noted: 2022-07-13

## 2024-09-23 PROBLEM — F32.A DEPRESSIVE DISORDER: Status: ACTIVE | Noted: 2024-09-23

## 2024-09-23 PROBLEM — I12.9 HYPERTENSIVE CHRONIC KIDNEY DISEASE WITH STAGE 1 THROUGH STAGE 4 CHRONIC KIDNEY DISEASE, OR UNSPECIFIED CHRONIC KIDNEY DISEASE: Status: ACTIVE | Noted: 2024-07-30

## 2024-09-23 PROBLEM — Z99.89 DEPENDENCE ON OTHER ENABLING MACHINES AND DEVICES: Status: ACTIVE | Noted: 2022-06-30

## 2024-09-23 PROBLEM — E66.812 OBESITY, CLASS II, BMI 35-39.9: Status: ACTIVE | Noted: 2022-06-18

## 2024-09-23 PROBLEM — E66.9 OBESITY, CLASS II, BMI 35-39.9: Status: ACTIVE | Noted: 2022-06-18

## 2024-09-23 PROBLEM — N18.31 STAGE 3A CHRONIC KIDNEY DISEASE (MULTI): Status: ACTIVE | Noted: 2022-12-06

## 2024-09-23 PROBLEM — A49.02 MRSA INFECTION: Status: ACTIVE | Noted: 2022-07-06

## 2024-09-23 LAB
ABO GROUP (TYPE) IN BLOOD: NORMAL
ANION GAP SERPL CALC-SCNC: 14 MMOL/L (ref 10–20)
ANTIBODY SCREEN: NORMAL
BASOPHILS # BLD AUTO: 0.06 X10*3/UL (ref 0–0.1)
BASOPHILS NFR BLD AUTO: 0.8 %
BUN SERPL-MCNC: 20 MG/DL (ref 6–23)
CALCIUM SERPL-MCNC: 9 MG/DL (ref 8.6–10.3)
CHLORIDE SERPL-SCNC: 101 MMOL/L (ref 98–107)
CO2 SERPL-SCNC: 26 MMOL/L (ref 21–32)
CREAT SERPL-MCNC: 0.69 MG/DL (ref 0.5–1.05)
EGFRCR SERPLBLD CKD-EPI 2021: >90 ML/MIN/1.73M*2
EOSINOPHIL # BLD AUTO: 0.15 X10*3/UL (ref 0–0.7)
EOSINOPHIL NFR BLD AUTO: 1.9 %
ERYTHROCYTE [DISTWIDTH] IN BLOOD BY AUTOMATED COUNT: 14.4 % (ref 11.5–14.5)
EST. AVERAGE GLUCOSE BLD GHB EST-MCNC: 163 MG/DL
GLUCOSE SERPL-MCNC: 202 MG/DL (ref 74–99)
HBA1C MFR BLD: 7.3 %
HCT VFR BLD AUTO: 41.1 % (ref 36–46)
HGB BLD-MCNC: 12.7 G/DL (ref 12–16)
IMM GRANULOCYTES # BLD AUTO: 0.03 X10*3/UL (ref 0–0.7)
IMM GRANULOCYTES NFR BLD AUTO: 0.4 % (ref 0–0.9)
LYMPHOCYTES # BLD AUTO: 1.94 X10*3/UL (ref 1.2–4.8)
LYMPHOCYTES NFR BLD AUTO: 24.7 %
MCH RBC QN AUTO: 25.2 PG (ref 26–34)
MCHC RBC AUTO-ENTMCNC: 30.9 G/DL (ref 32–36)
MCV RBC AUTO: 82 FL (ref 80–100)
MONOCYTES # BLD AUTO: 0.52 X10*3/UL (ref 0.1–1)
MONOCYTES NFR BLD AUTO: 6.6 %
NEUTROPHILS # BLD AUTO: 5.17 X10*3/UL (ref 1.2–7.7)
NEUTROPHILS NFR BLD AUTO: 65.6 %
NRBC BLD-RTO: 0 /100 WBCS (ref 0–0)
PLATELET # BLD AUTO: 236 X10*3/UL (ref 150–450)
POTASSIUM SERPL-SCNC: 3.9 MMOL/L (ref 3.5–5.3)
RBC # BLD AUTO: 5.04 X10*6/UL (ref 4–5.2)
RH FACTOR (ANTIGEN D): NORMAL
SODIUM SERPL-SCNC: 137 MMOL/L (ref 136–145)
WBC # BLD AUTO: 7.9 X10*3/UL (ref 4.4–11.3)

## 2024-09-23 PROCEDURE — 86850 RBC ANTIBODY SCREEN: CPT

## 2024-09-23 PROCEDURE — 80048 BASIC METABOLIC PNL TOTAL CA: CPT

## 2024-09-23 PROCEDURE — 83036 HEMOGLOBIN GLYCOSYLATED A1C: CPT

## 2024-09-23 PROCEDURE — 93010 ELECTROCARDIOGRAM REPORT: CPT | Performed by: INTERNAL MEDICINE

## 2024-09-23 PROCEDURE — 86900 BLOOD TYPING SEROLOGIC ABO: CPT

## 2024-09-23 PROCEDURE — 99204 OFFICE O/P NEW MOD 45 MIN: CPT | Performed by: NURSE PRACTITIONER

## 2024-09-23 PROCEDURE — 85025 COMPLETE CBC W/AUTO DIFF WBC: CPT

## 2024-09-23 PROCEDURE — 86901 BLOOD TYPING SEROLOGIC RH(D): CPT

## 2024-09-23 PROCEDURE — 36415 COLL VENOUS BLD VENIPUNCTURE: CPT

## 2024-09-23 RX ORDER — ONDANSETRON HYDROCHLORIDE 2 MG/ML
4 INJECTION, SOLUTION INTRAVENOUS ONCE AS NEEDED
OUTPATIENT
Start: 2024-09-23

## 2024-09-23 RX ORDER — PROCHLORPERAZINE EDISYLATE 5 MG/ML
5 INJECTION INTRAMUSCULAR; INTRAVENOUS ONCE AS NEEDED
OUTPATIENT
Start: 2024-09-23

## 2024-09-23 RX ORDER — DROPERIDOL 2.5 MG/ML
0.62 INJECTION, SOLUTION INTRAMUSCULAR; INTRAVENOUS ONCE AS NEEDED
OUTPATIENT
Start: 2024-09-23

## 2024-09-23 RX ORDER — SODIUM CHLORIDE, SODIUM LACTATE, POTASSIUM CHLORIDE, CALCIUM CHLORIDE 600; 310; 30; 20 MG/100ML; MG/100ML; MG/100ML; MG/100ML
100 INJECTION, SOLUTION INTRAVENOUS CONTINUOUS
OUTPATIENT
Start: 2024-09-23

## 2024-09-23 RX ORDER — HYDRALAZINE HYDROCHLORIDE 20 MG/ML
5 INJECTION INTRAMUSCULAR; INTRAVENOUS EVERY 30 MIN PRN
OUTPATIENT
Start: 2024-09-23

## 2024-09-23 RX ORDER — OXYCODONE HYDROCHLORIDE 5 MG/1
5 TABLET ORAL
OUTPATIENT
Start: 2024-09-23

## 2024-09-23 RX ORDER — SODIUM CHLORIDE, SODIUM LACTATE, POTASSIUM CHLORIDE, CALCIUM CHLORIDE 600; 310; 30; 20 MG/100ML; MG/100ML; MG/100ML; MG/100ML
20 INJECTION, SOLUTION INTRAVENOUS CONTINUOUS
OUTPATIENT
Start: 2024-09-23

## 2024-09-23 ASSESSMENT — ENCOUNTER SYMPTOMS
CARDIOVASCULAR NEGATIVE: 1
CONSTIPATION: 1
WOUND: 1
NECK NEGATIVE: 1
RHINORRHEA: 1
RESPIRATORY NEGATIVE: 1
CONSTITUTIONAL NEGATIVE: 1
ARTHRALGIAS: 1

## 2024-09-23 NOTE — CPM/PAT H&P
CPM/PAT Evaluation       Name: Ramya Muller)  /Age: 1955/69 y.o.     SURGEON :DR TRACY AARON   Surgery, Date, and Length:  Left Foot Application Vacuum-Assisted Drainage; Left Foot Application Skin Graft; Preparation-Recipient Site: Site; Torso or Extremity Exluding Hands & Feet by Open Wound  Excision;Initital 100 Sq. cm or Less Left Foot Application Skin Graft Lower Extremity 24      HPI:  This a 69 y.o. fe-male who presents for presurgical evaluation for for above mentioned procedure  Pt was admitted in house on 24 for cellulitis and and left great toe ulcer. She remained hospitalized for 12 days   . After discussion of the risks and benefits with  the patient elects to proceed with the planned procedure.       Past Medical History:   Diagnosis Date    Cellulitis     HTN (hypertension)     Osteomyelitis (Multi) 2024    LT GREAT TOE    Type 2 diabetes mellitus (Multi)        Past Surgical History:   Procedure Laterality Date    HYSTERECTOMY      MYOMECTOMY       Anesthesia History  Pt denies any past history of anesthetic complications such as PONV, awareness, prolonged sedation, dental damage, aspiration, cardiac arrest, difficult intubation, difficult I.V. access or unexpected hospital admissions.  NO malignant hyperthermia and or pseudo cholinesterase deficiency.    The patient is not  a Jew and WILL accept blood and blood products if medically indicated.   No history of blood transfusions .Type and screen  sent.     Social History  Social History     Substance and Sexual Activity   Drug Use Never      Social History     Substance and Sexual Activity   Alcohol Use Not Currently      Social History     Tobacco Use   Smoking Status Never    Passive exposure: Never   Smokeless Tobacco Never          No family history on file.    Allergies   Allergen Reactions    Atorvastatin Anaphylaxis    Pregabalin Anaphylaxis       Prior to Admission medications     Medication Sig Start Date End Date Taking? Authorizing Provider   cholecalciferol (D3-2000) 50 mcg (2,000 unit) capsule Take 1 capsule (50 mcg) by mouth once daily.    Historical Provider, MD   DULoxetine (Cymbalta) 30 mg DR capsule Take 1 capsule (30 mg) by mouth once daily. Do not crush or chew.    Historical Provider, MD   gabapentin (Neurontin) 300 mg capsule Take 1 capsule (300 mg) by mouth 3 times a day. 7/27/24 8/26/24  Jimbo Wynn MD   glipiZIDE (Glucotrol) 5 mg tablet Take 1 tablet (5 mg) by mouth once daily. 7/27/24 10/25/24  Jimbo Wynn MD   multivitamin tablet Take 1 tablet by mouth once daily.    Historical Provider, MD   polyethylene glycol (Glycolax, Miralax) 17 gram packet Take 17 g by mouth once daily. 7/27/24   Jimbo Wynn MD   polyethylene glycol (Glycolax, Miralax) 17 gram packet Take 17 g by mouth 2 times a day. 7/29/24   Michael Leon MD   sennosides-docusate sodium (Clara-Colace) 8.6-50 mg tablet Take 2 tablets by mouth 2 times a day. 7/29/24   Michael Leon MD   triamterene-hydrochlorothiazid (Maxzide-25) 37.5-25 mg tablet Take 1 tablet by mouth once daily.    Historical Provider, MD        PAT ROS:   Constitutional:   neg    Neuro/Psych:   Eyes:   Ears:   Nose:    nasal discharge  Mouth:   neg    Throat:   neg    Neck:   neg    Cardio:   neg    Respiratory:   neg    Endocrine:   GI:    constipation  :   neg    Musculoskeletal:    arthralgias  Hematologic:   neg    Skin:   sores/wound      Physical Exam  Vitals reviewed.   Constitutional:       Appearance: Normal appearance.   HENT:      Head: Normocephalic.      Mouth/Throat:      Mouth: Mucous membranes are dry.   Eyes:      Extraocular Movements: Extraocular movements intact.      Pupils: Pupils are equal, round, and reactive to light.   Cardiovascular:      Rate and Rhythm: Normal rate and regular rhythm.      Pulses: Normal pulses.      Heart sounds: Normal heart sounds.   Pulmonary:      Effort: Pulmonary effort is  "normal.      Breath sounds: Normal breath sounds.   Musculoskeletal:         General: Normal range of motion.      Cervical back: Normal range of motion.   Skin:     General: Skin is warm and dry.   Neurological:      Mental Status: She is alert and oriented to person, place, and time.   Psychiatric:         Behavior: Behavior normal.          PAT AIRWAY:   Airway:     Mallampati::  III   Missing upper left     /76   Pulse 100   Temp 36.6 °C (97.9 °F)   Resp 18   Ht 1.575 m (5' 2\")   Wt 88 kg (194 lb 0.1 oz)   SpO2 97%   BMI 35.48 kg/m²     EKG in epic     Lab Results   Component Value Date    WBC 7.9 09/23/2024    HGB 12.7 09/23/2024    HCT 41.1 09/23/2024    MCV 82 09/23/2024     09/23/2024     Lab Results   Component Value Date    GLUCOSE 202 (H) 09/23/2024    CALCIUM 9.0 09/23/2024     09/23/2024    K 3.9 09/23/2024    CO2 26 09/23/2024     09/23/2024    BUN 20 09/23/2024    CREATININE 0.69 09/23/2024       A1C PENDING     ASSESSMENT/PLAN    Patient is a 69 year-old  scheduled for Left Foot Application Vacuum-Assisted Drainage; Left Foot Application Skin Graft; Preparation-Recipient Site: Site; Torso or Extremity Exluding Hands & Feet by Open Wound  Excision;Initital 100 Sq. cm or Less Left Foot Application Skin Graft Lower Extremity  with Dr. AARON   on  9/24/24 .  CARDIOVASCULAR:  RCRI score / Risk: The patients score is 0 based on history . Per ACC/AHA guidelines this places her  at  3.9% risk for MACE undergoing a low  risk procedure . The patient has the following risk factors: denies   Functional Capacity: The patients exercise tolerance is  4  METS. This is based on the patient's is unable to ambulate well due to pain in left foot . Patient denies  active cardiac symptoms or anginal equivalents .    HISTORY AFIB:  Per patient she had Afib postoperatively after her hysterectomy in 2014 . Todays EKG shows SR.    Per Dr Marvin the patient is to have a cardiac evaluation based " on the discharge recommendation of the ER doctor on 1/16/2022 .  Dr Herndon is aware and his office to inform patient of surgical postponement and need for cardiac work up.    PULMONARY:  The patient has the following factors that place them at increased risk of perioperative pulmonary complications;age greater than 65/BMI greater than 27/TATO /greater than 2.5 hour procedure.  Postoperatively the patient would benefit from early pulmonary toilet/incentive spirometry q 1-2 hours while awake/pulse oximetry/cautious use of respiratory depressant medications such as opioids/elevate the HOB/oral hygiene.    DM:  The patient has 10 year history of diabetes.Currently the patient manages their diabetes with oral agents, her  recent A1C was pending  on 9/23/24.Pt was instructed to hold her oral anti diabetic agent the am of surgery       DVT:  CAPRINI SCORE=7  The patient has the following factors that increase HER  Risk for thrombus formation ; Virchow's triad AGE>65, BMI>30, INFECTION , , Surgical procedure >2 hrs  procedure .    Recommendations: DVT prophylaxis  per Dr. Herndon protocol . SCD's, SARTHAK's, and early ambulation are recommended. Heparin or LMWH is recommended for the very high risk .      Risk assessment complete.  Patient is scheduled for  low  surgical risk procedure.  Patient is considered an increased  risk to proceed with the planned procedure.      Preoperative medication instructions were provided and reviewed with the patient.  Any additional testing or evaluation was explained to the patient.  Nothing by mouth instructions were discussed and patient's questions were answered prior to conclusion to this encounter.  Patient verbalized understanding of preoperative instructions given in preadmission testing; discharge instructions available in EMR.

## 2024-09-23 NOTE — CPM/PAT NURSE NOTE
CPM/PAT Nurse Note      Name: Ramya Muller (Richelle Barnes)  /Age: 1955/69 y.o.       Past Medical History:   Diagnosis Date    Cellulitis     HTN (hypertension)     Osteomyelitis (Multi) 2024    LT GREAT TOE    Type 2 diabetes mellitus (Multi)        Past Surgical History:   Procedure Laterality Date    HYSTERECTOMY      MYOMECTOMY         Patient Sexual activity questions deferred to the physician.    No family history on file.    Allergies   Allergen Reactions    Atorvastatin Anaphylaxis    Pregabalin Anaphylaxis       Prior to Admission medications    Medication Sig Start Date End Date Taking? Authorizing Provider   cholecalciferol (D3-2000) 50 mcg (2,000 unit) capsule Take 1 capsule (50 mcg) by mouth once daily.    Historical Provider, MD   DULoxetine (Cymbalta) 30 mg DR capsule Take 1 capsule (30 mg) by mouth once daily. Do not crush or chew.    Historical Provider, MD   gabapentin (Neurontin) 300 mg capsule Take 1 capsule (300 mg) by mouth 3 times a day. 24  Jimbo Wynn MD   glipiZIDE (Glucotrol) 5 mg tablet Take 1 tablet (5 mg) by mouth once daily. 7/27/24 10/25/24  Jimbo Wynn MD   multivitamin tablet Take 1 tablet by mouth once daily.    Historical Provider, MD   polyethylene glycol (Glycolax, Miralax) 17 gram packet Take 17 g by mouth once daily. 24   Jimbo Wynn MD   polyethylene glycol (Glycolax, Miralax) 17 gram packet Take 17 g by mouth 2 times a day. 24   Michael Leon MD   sennosides-docusate sodium (Clara-Colace) 8.6-50 mg tablet Take 2 tablets by mouth 2 times a day. 24   Michael Leon MD   triamterene-hydrochlorothiazid (Maxzide-25) 37.5-25 mg tablet Take 1 tablet by mouth once daily.    Historical Provider, MD MARTINEZ ROS     DASI Risk Score    No data to display       Caprini DVT Assessment    No data to display       Modified Frailty Index    No data to display       CHADS2 Stroke Risk  Current as of 2 hours ago        4% 3 to 100%:  High Risk   2 to < 3%: Medium Risk   0 to < 2%: Low Risk     No Change          This score determines the patient's risk of having a stroke if the patient has atrial fibrillation.          Points Metrics   0 Has Congestive Heart Failure:  No     Patients with congestive heart failure get 1 point.    Current as of 2 hours ago   1 Has Hypertension:  Yes     Patients with hypertension get 1 point.    Current as of 2 hours ago   0 Age:  69     Patients who are 75 years of age or older get 1 point.    Current as of 2 hours ago   1 Has Diabetes:  Yes     Patients with diabetes get 1 point.    Current as of 2 hours ago   0 Had Stroke:  No  Had TIA:  No  Had Thromboembolism:  No     Patients who have had a stroke, TIA, or thromboembolism get 2 points.    Current as of 2 hours ago             Revised Cardiac Risk Index    No data to display       Apfel Simplified Score    No data to display       Risk Analysis Index Results This Encounter    No data found in the last 1 encounters.       After Visit Summary (AVS) reviewed and patient verbalized good understanding of medications and NPO instructions.     Nurse Plan of Action:

## 2024-09-23 NOTE — ANESTHESIA PREPROCEDURE EVALUATION
Patient: Ramya Muller    Procedure Information       Date/Time: 09/24/24 0830    Procedures:       Left Foot Application Vacuum-Assisted Drainage; Left Foot Application Skin Graft; Preparation-Recipient Site: Site; Torso or Extremity Exluding Hands & Feet by Open Wound  Excision;Initital 100 Sq. cm or Less (Left: Foot)      Left Foot Application Skin Graft Lower Extremity (Left: Foot)    Location: [facility] A OR 06 / Virtual [facility] A OR    Surgeons: Crow Herndon DPM                                                         Pre- Anesthesia Evaluation                                            Ramya Muller is a 69 y.o. 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 She has never been exposed to tobacco smoke. She has never used smokeless tobacco. She reports that she does not currently use alcohol. She reports that she does not use drugs.    Allergies and Medications   Allergies   Allergen Reactions    Atorvastatin Anaphylaxis    Pregabalin Anaphylaxis     Current Outpatient Medications   Medication Instructions    cholecalciferol (D3-2000) 2,000 Units, oral, Daily    DULoxetine (CYMBALTA) 30 mg, oral, Daily, Do not crush or chew.    gabapentin (NEURONTIN) 300 mg, oral, 3 times daily    glipiZIDE (GLUCOTROL) 5 mg, oral, Daily    multivitamin tablet 1 tablet, oral, Daily    polyethylene glycol (GLYCOLAX, MIRALAX) 17 g, oral, Daily    polyethylene glycol (GLYCOLAX, MIRALAX) 17  "g, oral, 2 times daily    sennosides-docusate sodium (Clara-Colace) 8.6-50 mg tablet 2 tablets, oral, 2 times daily    triamterene-hydrochlorothiazid (Maxzide-25) 37.5-25 mg tablet 1 tablet, oral, Daily       Recent Labs     08/12/24  0600 08/05/24  0600   WBC 7.5 8.4   HGB 11.5* 11.6*   HCT 36.0 35.7*    318   MCV 82 81     Recent Labs     07/24/24  1452   PROTIME 12.2   INR 1.1     Lab Results   Component Value Date    ABO O 07/24/2024     Recent Labs     07/18/24  1151   FERRITIN 230*   TIBC 304   IRONSAT 7*     Recent Labs     08/12/24  0600 08/05/24 0600 08/01/24  0600   EGFR >90 >90 >90   ANIONGAP 12 13 14   BUN 17 13 18   CREATININE 0.60 0.60 0.65    136 135*   K 4.0 3.9 3.8    99 98   CO2 28 28 27   GLUCOSE 112* 116* 119*     Recent Labs     07/27/24  1122 07/23/24  0949 07/19/24  0804 07/18/24  1151 07/06/24 2025   PROT  --   --   --  7.6 7.8   ALBUMIN 3.6 3.6   < > 3.8 4.3   BILITOT  --   --   --  0.4 0.3   ALKPHOS  --   --   --  79 81   ALT  --   --   --  13 11   AST  --   --   --  21 25    < > = values in this interval not displayed.     Recent Labs     07/18/24  1151   HGBA1C 8.6*     Recent Labs     08/12/24  0600 08/05/24  0600 08/01/24  0600 07/27/24  1122 07/23/24  0949   CALCIUM 8.9 9.0   < > 9.5 8.9   PHOS  --   --   --  3.6 3.7    < > = values in this interval not displayed.       EKG   No results found for this or any previous visit.  Echo    No results found for this or any previous visit from the past 1800 days.      Ejection Fraction   No results found for: \"EF\"  Cardiac Cath   Coronary Angiography: No results found for this or any previous visit from the past 1800 days.    Right Heart Cath:  No results found for this or any previous visit from the past 1800 days.      Stress Test   Nuclear: No results found for this or any previous visit from the past 1000 days.    No results found for this or any previous visit from the past 1800 days.    Metabolic Stress::    No results " "found for this or any previous visit.    Cardiac Imaging   Cardiac Scoring: No results found for this or any previous visit from the past 1800 days.    Cardiac MRI: No results found for this or any previous visit from the past 1800 days.    Carotid Doppler  No results found for this or any previous visit from the past 88131 days.     AAA screen  No valid procedures specified.    No results found for this or any previous visit.    MR  === 07/18/24 ===        Visit Vitals  OB Status Postmenopausal   Smoking Status Never            9/23/2024    12:31 PM 7/30/2024    11:51 AM 7/30/2024     3:52 AM 7/29/2024     9:00 PM 7/29/2024    12:55 PM 7/29/2024     5:47 AM 7/28/2024     8:25 PM   BP REVIEW   BP (ultimate) 133/76 142/79 142/78 126/74 121/66 123/71 122/68      No results found for: \"PREGTESTUR\", \"PREGSERUM\", \"HCG\", \"HCGQUANT\"        Code Status: Full Code                          Relevant Problems   Cardiac   (+) HTN (hypertension)   (+) Type 2 diabetes mellitus with diabetic peripheral angiopathy without gangrene, without long-term current use of insulin (Multi)      Endocrine   (+) Diabetic neuropathy (Multi)   (+) Obesity   (+) Type 2 diabetes mellitus with diabetic peripheral angiopathy without gangrene, without long-term current use of insulin (Multi)      Musculoskeletal   (+) Fibromyalgia      ID   (+) Osteomyelitis       Clinical information reviewed:                   NPO Detail:  No data recorded     PHYSICAL EXAM    Anesthesia Plan  "

## 2024-09-23 NOTE — PREPROCEDURE INSTRUCTIONS
Medication List            Accurate as of September 23, 2024 12:39 PM. Always use your most recent med list.                D3-2000 50 mcg (2,000 unit) capsule  Generic drug: cholecalciferol  Medication Adjustments for Surgery: Do Not take on the morning of surgery     DULoxetine 30 mg DR capsule  Commonly known as: Cymbalta  Medication Adjustments for Surgery: Take on the morning of surgery     gabapentin 300 mg capsule  Commonly known as: Neurontin  Take 1 capsule (300 mg) by mouth 3 times a day.  Medication Adjustments for Surgery: Take on the morning of surgery     glipiZIDE 5 mg tablet  Commonly known as: Glucotrol  Take 1 tablet (5 mg) by mouth once daily.  Medication Adjustments for Surgery: Do Not take on the morning of surgery     multivitamin tablet  Additional Medication Adjustments for Surgery: Take last dose 7 days before surgery     * polyethylene glycol 17 gram packet  Commonly known as: Glycolax, Miralax  Take 17 g by mouth once daily.  Medication Adjustments for Surgery: Do Not take on the morning of surgery     * polyethylene glycol 17 gram packet  Commonly known as: Glycolax, Miralax  Take 17 g by mouth 2 times a day.  Medication Adjustments for Surgery: Do Not take on the morning of surgery     sennosides-docusate sodium 8.6-50 mg tablet  Commonly known as: Clara-Colace  Take 2 tablets by mouth 2 times a day.  Medication Adjustments for Surgery: Do Not take on the morning of surgery     triamterene-hydrochlorothiazid 37.5-25 mg tablet  Commonly known as: Maxzide-25  Medication Adjustments for Surgery: Take on the morning of surgery           * This list has 2 medication(s) that are the same as other medications prescribed for you. Read the directions carefully, and ask your doctor or other care provider to review them with you.                  CONTACT SURGEON'S OFFICE IF YOU DEVELOP:  * Fever = 100.4 F   * New respiratory symptoms (e.g. cough, shortness of breath, respiratory distress, sore  throat)  * Recent loss of taste or smell  *Flu like symptoms such as headache, fatigue or gastrointestinal symptoms  * You develop any open sores, shingles, burning or painful urination   AND/OR:  * You no longer wish to have the surgery.  * Any other personal circumstances change that may lead to the need to cancel or defer this surgery.  *You were admitted to any hospital within one week of your planned procedure.    SMOKING:  *Quitting smoking can make a huge difference to your health and recovery from surgery.    *If you need help with quitting, call 1-321-QUIT-NOW.    THE DAY BEFORE SURGERY:  *Do not eat any food after midnight the night before surgery.   You may have  sips of water to take am medications clear liquid until TWO hours before your instructed arrival time to the hospital  DIABETICS:  Please check fasting blood sugar  upon waking up.  If fasting sugar is <80 mg/dl, please drink 100ml/3oz of apple juice no later than 2 hours prior to surgery.      SURGICAL TIME  *You will be contacted between 2 p.m. and 6 p.m. the business day before your surgery with your arrival time.  *If you haven't received a call by 6pm, call [phone].  *Scheduled surgery times may change and you will be notified if this occurs-check your personal voicemail for any updates.    ON THE MORNING OF SURGERY:  *Wear comfortable, loose fitting clothing.   *Do not use moisturizers, creams, lotions or perfume.  *All jewelry and valuables should be left at home.  *Prosthetic devices such as contact lenses, hearing aids, dentures, eyelash extensions, hairpins and body piercing must be removed before surgery.    BRING WITH YOU:  *Photo ID and insurance card  *Current list of medicines and allergies  *Pacemaker/Defibrillator/Heart stent cards  *CPAP machine and mask  *Slings/splints/crutches  *Copy of your complete Advanced Directive/DHPOA-if applicable  *Neurostimulator implant remote    PARKING AND ARRIVAL:  *Check in at the Main  Entrance desk and let them know you are here for surgery.  *You will be directed to the 2nd floor surgical waiting area.    AFTER OUTPATIENT SURGERY:  *A responsible adult MUST accompany you at the time of discharge and stay with you for 24 hours after your surgery.  *You may NOT drive yourself home after surgery.  *You may use a taxi or ride sharing service ([employer], Uber) to return home ONLY if you are accompanied by a friend or family member.  *Instructions for resuming your medications will be provided by your surgeon.

## 2024-09-24 ENCOUNTER — ANESTHESIA (OUTPATIENT)
Dept: OPERATING ROOM | Facility: HOSPITAL | Age: 69
End: 2024-09-24
Payer: MEDICARE

## 2024-10-03 ENCOUNTER — OFFICE VISIT (OUTPATIENT)
Dept: CARDIOLOGY | Facility: HOSPITAL | Age: 69
End: 2024-10-03
Payer: MEDICARE

## 2024-10-03 VITALS
OXYGEN SATURATION: 99 % | DIASTOLIC BLOOD PRESSURE: 86 MMHG | HEIGHT: 63 IN | BODY MASS INDEX: 35.58 KG/M2 | HEART RATE: 92 BPM | SYSTOLIC BLOOD PRESSURE: 135 MMHG | WEIGHT: 200.8 LBS

## 2024-10-03 DIAGNOSIS — Z01.810 PREOP CARDIOVASCULAR EXAM: ICD-10-CM

## 2024-10-03 DIAGNOSIS — I48.0 PAROXYSMAL ATRIAL FIBRILLATION (MULTI): Primary | ICD-10-CM

## 2024-10-03 PROCEDURE — 1036F TOBACCO NON-USER: CPT | Performed by: HOSPITALIST

## 2024-10-03 PROCEDURE — 1160F RVW MEDS BY RX/DR IN RCRD: CPT | Performed by: HOSPITALIST

## 2024-10-03 PROCEDURE — 3075F SYST BP GE 130 - 139MM HG: CPT | Performed by: HOSPITALIST

## 2024-10-03 PROCEDURE — 99205 OFFICE O/P NEW HI 60 MIN: CPT | Performed by: HOSPITALIST

## 2024-10-03 PROCEDURE — 99215 OFFICE O/P EST HI 40 MIN: CPT | Performed by: HOSPITALIST

## 2024-10-03 PROCEDURE — 3050F LDL-C >= 130 MG/DL: CPT | Performed by: HOSPITALIST

## 2024-10-03 PROCEDURE — 3079F DIAST BP 80-89 MM HG: CPT | Performed by: HOSPITALIST

## 2024-10-03 PROCEDURE — 1159F MED LIST DOCD IN RCRD: CPT | Performed by: HOSPITALIST

## 2024-10-03 PROCEDURE — 3051F HG A1C>EQUAL 7.0%<8.0%: CPT | Performed by: HOSPITALIST

## 2024-10-03 PROCEDURE — 3008F BODY MASS INDEX DOCD: CPT | Performed by: HOSPITALIST

## 2024-10-03 RX ORDER — COLLAGENASE SANTYL 250 [ARB'U]/G
OINTMENT TOPICAL
COMMUNITY
Start: 2024-08-22

## 2024-10-03 RX ORDER — HONEY 100 %
PASTE (ML) TOPICAL DAILY
COMMUNITY

## 2024-10-03 NOTE — PROGRESS NOTES
Subjective   Ramya Muller is a 69 y.o. female with PMH of HTN, HLD, diabetes, CKD, TATO, paroxysmal atrial fibrillation [documented episodes in 12/2014 and 04/2015], and other comorbidities, who is here for cardiac preop risk stratification for skin graft of her lower extremity wound.  Patient denies any history of stroke, TIA, heart attack, heart attack, insulin use, or CKD.  Patient climbs 13 steps stairs at home without any cardiovascular symptoms.  She denies any shortness of breath or chest pain.  She denies any palpitation.  Patient never smoked.  Patient has a wound in her left foot and is going for a skin graft.    Patient is on triamterene-HCTZ 37.5-25 mg once daily.  Blood pressure today is 135/86, heart rate 92.    EKG from 9/23/2024 with NSR and RBBB.    Most recent blood work from 9/23/2024 with creatinine of 0.69, hemoglobin 12.7, A1c 7.3.    Nuclear Stress Test: 2/2015  1. Perfusion study: Normal Study.  2. Functional capacity N/A (pharmacological).  3. There is no scintigraphic evidence for inducible ischemia.  4. No evidence of scarred Mycocardium.  5. Left ventricle is normal in size. the left ventricle systolic function is normal.  6. Right ventricle is normal in size The right ventricle systolic function is normal.  7. The Stress LVEF is 73 %.  8. This is a low risk scan.     Review of Systems  ROS is negative other than in HPI.      Objective   Physical Exam  General: NAD  HEENT: IEOM, PERRL   Neck: No JVD or carotid bruit  Lungs: CTAB  Heart: RRR, normal S1 and S2, no loud murmurs  Abdomen: Soft, nontender, positive bowel sounds  Extremities: No edema  Neurologic: No FND  Psychiatric: Normal mood and affect    Assessment/Plan   1-cardiac preop risk stratification:  -For a skin graft of her left foot wound.  -Patient has no clinical risk factors, she has goodlevel of function capacity without any cardiovascular symptoms.  -EKG from 9/23/2024 with NSR and RBBB.  -Patient is at acceptable/low  cardiac risk for her upcoming skin graft surgery.    2-paroxysmal A-fib:  -Most recent EKG in NSR, her rhythm is regular on exam.  -[id number] 2 score is is 4, will start patient on Eliquis 5 mg twice daily, to be started after surgery.  -Will obtain an echocardiogram.    3-HTN:  -Controlled, continue current meds.    RTC in 6 months.    Rox Riggs MD

## 2024-10-03 NOTE — PATIENT INSTRUCTIONS
Thank you for visiting us today.    You are at acceptable cardiac risk for your surgery, please proceed.    We are going to start you on Eliquis 5 mg twice daily to protect against stroke related to your atrial fibrillation.  Please start your Eliquis after your surgery by few days.    We are going to obtain an echocardiogram, please call us if you do not hear from us within few days of your testing.    Will see back in 6 months, please call us at [phone] if you have any questions.

## 2024-10-07 ENCOUNTER — HOSPITAL ENCOUNTER (OUTPATIENT)
Facility: HOSPITAL | Age: 69
Setting detail: OUTPATIENT SURGERY
End: 2024-10-07
Attending: PODIATRIST | Admitting: PODIATRIST
Payer: MEDICARE

## 2024-10-07 ENCOUNTER — PREP FOR PROCEDURE (OUTPATIENT)
Dept: PODIATRY | Facility: HOSPITAL | Age: 69
End: 2024-10-07
Payer: MEDICARE

## 2024-10-07 DIAGNOSIS — I73.9 PVD (PERIPHERAL VASCULAR DISEASE) (CMS-HCC): ICD-10-CM

## 2024-10-07 DIAGNOSIS — L97.426: Primary | ICD-10-CM

## 2024-10-07 DIAGNOSIS — E11.49 DIABETIC NEUROPATHY WITH NEUROLOGIC COMPLICATION (MULTI): ICD-10-CM

## 2024-10-07 DIAGNOSIS — L97.421 NON-PRESSURE CHRONIC ULCER OF LEFT HEEL AND MIDFOOT LIMITED TO BREAKDOWN OF SKIN: ICD-10-CM

## 2024-10-07 DIAGNOSIS — E11.40 DIABETIC NEUROPATHY WITH NEUROLOGIC COMPLICATION (MULTI): ICD-10-CM

## 2024-10-07 RX ORDER — SODIUM CHLORIDE, SODIUM LACTATE, POTASSIUM CHLORIDE, CALCIUM CHLORIDE 600; 310; 30; 20 MG/100ML; MG/100ML; MG/100ML; MG/100ML
20 INJECTION, SOLUTION INTRAVENOUS CONTINUOUS
OUTPATIENT
Start: 2024-10-07

## 2024-10-08 DIAGNOSIS — I48.91 ATRIAL FIBRILLATION, UNSPECIFIED TYPE (MULTI): Primary | ICD-10-CM

## 2024-10-14 ENCOUNTER — APPOINTMENT (OUTPATIENT)
Dept: ENDOCRINOLOGY | Facility: CLINIC | Age: 69
End: 2024-10-14
Payer: COMMERCIAL

## 2024-11-02 ENCOUNTER — OFFICE VISIT (OUTPATIENT)
Dept: URGENT CARE | Age: 69
End: 2024-11-02
Payer: COMMERCIAL

## 2024-11-02 VITALS
SYSTOLIC BLOOD PRESSURE: 164 MMHG | BODY MASS INDEX: 34.19 KG/M2 | OXYGEN SATURATION: 98 % | DIASTOLIC BLOOD PRESSURE: 81 MMHG | TEMPERATURE: 97.8 F | WEIGHT: 193 LBS | HEART RATE: 93 BPM | RESPIRATION RATE: 16 BRPM

## 2024-11-02 DIAGNOSIS — R42 DIZZINESS: ICD-10-CM

## 2024-11-02 DIAGNOSIS — R42 VERTIGO: Primary | ICD-10-CM

## 2024-11-02 RX ORDER — MECLIZINE HYDROCHLORIDE 25 MG/1
25 TABLET ORAL 3 TIMES DAILY PRN
Qty: 30 TABLET | Refills: 0 | Status: SHIPPED | OUTPATIENT
Start: 2024-11-02 | End: 2024-11-02 | Stop reason: ALTCHOICE

## 2024-11-02 RX ORDER — MECLIZINE HYDROCHLORIDE 25 MG/1
25 TABLET ORAL 3 TIMES DAILY PRN
Qty: 30 TABLET | Refills: 0 | Status: SHIPPED | OUTPATIENT
Start: 2024-11-02 | End: 2024-11-12

## 2024-11-02 ASSESSMENT — PAIN SCALES - GENERAL: PAINLEVEL_OUTOF10: 0-NO PAIN

## 2024-11-02 ASSESSMENT — ENCOUNTER SYMPTOMS: DIZZINESS: 1

## 2024-12-11 ENCOUNTER — APPOINTMENT (OUTPATIENT)
Dept: PHARMACY | Facility: HOSPITAL | Age: 69
End: 2024-12-11
Payer: MEDICARE

## 2025-01-16 ENCOUNTER — APPOINTMENT (OUTPATIENT)
Dept: PHARMACY | Facility: HOSPITAL | Age: 70
End: 2025-01-16
Payer: MEDICARE

## 2025-01-16 DIAGNOSIS — I48.0 PAROXYSMAL ATRIAL FIBRILLATION (MULTI): ICD-10-CM

## 2025-01-16 DIAGNOSIS — I48.91 ATRIAL FIBRILLATION, UNSPECIFIED TYPE (MULTI): ICD-10-CM

## 2025-01-16 PROCEDURE — RXMED WILLOW AMBULATORY MEDICATION CHARGE

## 2025-01-16 PROCEDURE — 93005 ELECTROCARDIOGRAM TRACING: CPT

## 2025-01-16 NOTE — Clinical Note
Ramya De Leon Dr has not started Eliquis due to the copay. Will send prescription to [facility] to utilize a 30 day free trial voucher while  PAP application is pending. Counseled patient on twice daily dosing and signs/symptoms of bleeding.  Will follow up in 1 month to ensure tolerability of Eliquis and  PAP approval.

## 2025-01-16 NOTE — PROGRESS NOTES
Pharmacist Clinic: Cardiology Management    Ramya Muller is a 69 y.o. female was referred to Clinical Pharmacy Team for anticoagulation management.     Referring Provider: Rox Riggs MD    THIS IS A NEW PATIENT APPOINTMENT. PATIENT WILL BE ESTABLISHING CARE WITH CLINICAL PHARMACY.    Appointment was completed by the patient who was reached at .    REVIEW OF PAST APPNT (IF APPLICABLE):   N/A    Allergies Reviewed? Yes    Allergies   Allergen Reactions    Atorvastatin Anaphylaxis    Pregabalin Anaphylaxis       Past Medical History:   Diagnosis Date    Atrial fibrillation (Multi) 06/30/2022    Cellulitis     Depressive disorder 09/23/2024    Fibromyalgia 07/23/2024    Gastroesophageal reflux disease 08/08/2016    Last Assessment & Plan:   Continue PPI      HTN (hypertension)     Hypertensive chronic kidney disease with stage 1 through stage 4 chronic kidney disease, or unspecified chronic kidney disease 07/30/2024    Mixed hyperlipidemia 07/13/2022    TATO (obstructive sleep apnea) 12/05/2012    Plan:  CPAP as needed      Plan:  CPAP as needed     Last Assessment & Plan:   Reports compliance with PAP therapy  Continue AuoPAP      Osteomyelitis 07/18/2024    LT GREAT TOE    Stage 3a chronic kidney disease (Multi) 12/06/2022    Type 2 diabetes mellitus        Current Outpatient Medications on File Prior to Visit   Medication Sig Dispense Refill    gabapentin (Neurontin) 300 mg capsule Take 1 capsule (300 mg) by mouth 3 times a day. (Patient taking differently: Take 400 mg by mouth 3 times a day.)      glipiZIDE (Glucotrol) 5 mg tablet Take 1 tablet (5 mg) by mouth once daily.      polyethylene glycol (Glycolax, Miralax) 17 gram packet Take 17 g by mouth once daily. (Patient taking differently: Take 17 g by mouth once daily as needed.)      triamterene-hydrochlorothiazid (Maxzide-25) 37.5-25 mg tablet Take 1 tablet by mouth once daily.      cholecalciferol (D3-2000) 50 mcg (2,000 unit) capsule Take 1  "capsule (50 mcg) by mouth once daily. (Patient not taking: Reported on 1/16/2025)      collagenase (SantyL) 250 unit/gram ointment Apply topically once daily. (Patient not taking: Reported on 1/16/2025)      DULoxetine (Cymbalta) 30 mg DR capsule Take 1 capsule (30 mg) by mouth once daily. Do not crush or chew. (Patient not taking: Reported on 1/16/2025)      polyethylene glycol (Glycolax, Miralax) 17 gram packet Take 17 g by mouth 2 times a day.      sennosides-docusate sodium (Clara-Colace) 8.6-50 mg tablet Take 2 tablets by mouth 2 times a day. (Patient not taking: Reported on 1/16/2025)      [DISCONTINUED] apixaban (Eliquis) 5 mg tablet Take 1 tablet (5 mg) by mouth 2 times a day. (Patient not taking: Reported on 1/16/2025) 60 tablet 11    [DISCONTINUED] MediHoney, honey, gel topical gel Apply topically once daily. apply to affected area every day      [DISCONTINUED] multivitamin tablet Take 1 tablet by mouth once daily.       No current facility-administered medications on file prior to visit.         RELEVANT LAB RESULTS:  Lab Results   Component Value Date    BILITOT 0.4 07/18/2024    CALCIUM 9.0 09/23/2024    CO2 26 09/23/2024     09/23/2024    CREATININE 0.69 09/23/2024    GLUCOSE 202 (H) 09/23/2024    ALKPHOS 79 07/18/2024    K 3.9 09/23/2024    PROT 7.6 07/18/2024     09/23/2024    AST 21 07/18/2024    ALT 13 07/18/2024    BUN 20 09/23/2024    ANIONGAP 14 09/23/2024    MG 1.95 07/27/2024    PHOS 3.6 07/27/2024    ALBUMIN 3.6 07/27/2024     Lab Results   Component Value Date    TRIG 131 07/21/2024    CHOL 193 07/21/2024    LDLCALC 137 (H) 07/21/2024    HDL 29.7 07/21/2024     No results found for: \"BMCBC\", \"CBCDIF\"     PHARMACEUTICAL ASSESSMENT:    MEDICATION RECONCILIATION    Home Pharmacy Reviewed? Yes, describe: [id number], [facility]    Added:  - None    Changed:  - Eliquis 5 mg: patient not taking  - Vitamin D3: patient not taking  - Gabapentin: patient reports taking 400 mg 3 times " daily  - Miralax: using once daily PRN    Removed:  - MediHoney gel: patient not using  - Multivitamin: patient not taking  - Sennosides-docusate: patient not taking    Drug Interactions? No clinically significant drug interactions requiring change in therapy found at the time of this visit.     Medication Documentation Review Audit       Reviewed by Gissel WyattD (Pharmacist) on 01/16/25 at 1332      Medication Order Taking? Sig Documenting Provider Last Dose Status   apixaban (Eliquis) 5 mg tablet [id number]  Take 1 tablet (5 mg) by mouth 2 times a day.   Patient not taking: Reported on 1/16/2025    Rox Riggs MD  Active   cholecalciferol (D3-2000) 50 mcg (2,000 unit) capsule [id number]  Take 1 capsule (50 mcg) by mouth once daily.   Patient not taking: Reported on 1/16/2025    Historical Provider, MD  Active   collagenase (SantyL) 250 unit/gram ointment [id number]  Apply topically once daily.   Patient not taking: Reported on 1/16/2025    Historical Provider, MD  Active   DULoxetine (Cymbalta) 30 mg DR capsule [id number]  Take 1 capsule (30 mg) by mouth once daily. Do not crush or chew.   Patient not taking: Reported on 1/16/2025    Historical Provider, MD  Active   gabapentin (Neurontin) 300 mg capsule [id number] Yes Take 1 capsule (300 mg) by mouth 3 times a day.   Patient taking differently: Take 400 mg by mouth 3 times a day.    Jimbo Wynn MD  Active   glipiZIDE (Glucotrol) 5 mg tablet [id number] Yes Take 1 tablet (5 mg) by mouth once daily. Jimbo Wynn MD  Active     Discontinued 01/16/25 1330     Discontinued 01/16/25 1331   polyethylene glycol (Glycolax, Miralax) 17 gram packet [id number] Yes Take 17 g by mouth once daily.   Patient taking differently: Take 17 g by mouth once daily as needed.    Jimbo Wynn MD  Active   polyethylene glycol (Glycolax, Miralax) 17 gram packet [id number]  Take 17 g by mouth 2 times a day. Michael Leon MD  Active   sennosides-docusate sodium (Clara-Colace) 8.6-50  mg tablet [id number]  Take 2 tablets by mouth 2 times a day.   Patient not taking: Reported on 1/16/2025    Michael Leon MD  Active   triamterene-hydrochlorothiazid (Maxzide-25) 37.5-25 mg tablet [id number] Yes Take 1 tablet by mouth once daily. Historical Provider, MD  Active                    DISEASE MANAGEMENT ASSESSMENT:     ANTICOAGULATION ASSESSMENT    The 10-year ASCVD risk score (Kory DK, et al., 2019) is: 34.2%    Values used to calculate the score:      Age: 69 years      Sex: Female      Is Non- : Yes      Diabetic: Yes      Tobacco smoker: No      Systolic Blood Pressure: 164 mmHg      Is BP treated: No      HDL Cholesterol: 29.7 mg/dL      Total Cholesterol: 193 mg/dL    DIAGNOSIS: prevention of nonvalvular atrial fibrilliation stroke and systemic embolism  - Patient is projected to be on anticoagulation long term  - [id number] Score: [3] (only included if diagnosis is atrial fibrillation)   Age: [<65 (0)] [65-74 (+1)] [> 75 (+2)]: 1  Sex: [Male/Female (+1)]: 1  CHF history: [No/Yes(+1)]: 0  Hypertension history: [No/Yes(+1)]: 1  Stroke/TIA/thromboembolism history: [No/Yes(+2)]: 0  Vascular disease history (prior MI, peripheral artery disease, aortic plaque): [No/Yes(+1)]: 0  Diabetes history: [No/Yes(+1)]: 1    CURRENT PHARMACOTHERAPY:    Eliquis 5 mg BID  68 y/o  87.5 kg  Scr 0.69 mg/dL (9/23/2024)    RELEVANT PAST MEDICAL HISTORY:   HTN, HLD, A-fib, T2DM, PVD    Affordability/Accessibility: Eliquis is cost prohibitive for patient  Adherence/Organization: none  Adverse Reactions: N/A - patient did not start due to cost  Recent Hospitalizations: none  Recent Falls/Trauma: none  Changes in Tobacco or Alcohol Intake:   Tobacco: none, does not use  Alcohol: none, does not use    EDUCATION/COUNSELING:   - Counseled patient on MOA, expectations, duration of therapy, contraindications, administration, and monitoring parameters  - Counseled patient of side effects that are  indicative of bleeding such as dark tarry stool, unexplainable bruising, or vomiting up a coffee ground like substance      Patient Assistance Program (PAP)    [facility] for program to be [facility] the [facility]     [facility] Permanent Address: [facility]   Prescription Insurance:   Yes   Members of Household: 1   Files Taxes: Yes       Patient will be email financial information to pharmacist directly at [email or web address].    Patient verbally reports monthly or yearly income which is less than 400% federal poverty level    Patient aware this process may take up to 6 weeks.     If approved medication must be filled through [facility] and MEDICATION WILL BE MAILED TO PATIENT.       DISCUSSION/NOTES:   Today's appointment was initial visit to establish care with Clinical Pharmacy. Ramya has not started Eliquis due to the copay. Will send prescription to [facility] to utilize a 30 day free trial voucher while  PAP application is pending. Counseled patient on twice daily dosing and signs/symptoms of bleeding.   Will follow up in 1 month to ensure tolerability of Eliquis and  PAP approval.     ASSESSMENT:    Assessment/Plan   Problem List Items Addressed This Visit       Atrial fibrillation (Multi)     Ramya is prescribed Eliquis for stroke/embolism prevention. No dosage adjustment needed for age, weight, and renal function.          Relevant Medications    apixaban (Eliquis) 5 mg tablet    Other Relevant Orders    Referral to Clinical Pharmacy       RECOMMENDATIONS/PLAN:    Start: Eliquis 5 mg BID  Follow up: 1 month     Last Appnt with Referring Provider: 10/3/2024  Next Appnt with Referring Provider: To be scheduled; notation per last visit to follow up in 6 months  Clinical Pharmacist follow up: 2/21/2025  VAF/Application Expiration: Yes    Date: Pending  Type of Encounter: Mary Strickland PharmD    Verbal consent to manage patient's drug therapy  was obtained from the patient . They were informed they may decline to participate or withdraw from participation in pharmacy services at any time.    Continue all meds under the continuation of care with the referring provider and clinical pharmacy team.

## 2025-01-16 NOTE — ASSESSMENT & PLAN NOTE
Ramya is prescribed Eliquis for stroke/embolism prevention. No dosage adjustment needed for age, weight, and renal function.

## 2025-01-17 LAB
ATRIAL RATE: 97 BPM
P AXIS: 55 DEGREES
P OFFSET: 214 MS
P ONSET: 150 MS
PR INTERVAL: 148 MS
Q ONSET: 224 MS
QRS COUNT: 16 BEATS
QRS DURATION: 142 MS
QT INTERVAL: 396 MS
QTC CALCULATION(BAZETT): 502 MS
QTC FREDERICIA: 464 MS
R AXIS: 97 DEGREES
T AXIS: 44 DEGREES
T OFFSET: 422 MS
VENTRICULAR RATE: 97 BPM

## 2025-01-20 ENCOUNTER — PHARMACY VISIT (OUTPATIENT)
Dept: PHARMACY | Facility: CLINIC | Age: 70
End: 2025-01-20
Payer: COMMERCIAL

## 2025-02-18 ENCOUNTER — APPOINTMENT (OUTPATIENT)
Dept: ENDOCRINOLOGY | Facility: CLINIC | Age: 70
End: 2025-02-18
Payer: MEDICARE

## 2025-02-21 ENCOUNTER — APPOINTMENT (OUTPATIENT)
Dept: PHARMACY | Facility: HOSPITAL | Age: 70
End: 2025-02-21
Payer: MEDICARE

## 2025-02-22 ENCOUNTER — HOSPITAL ENCOUNTER (INPATIENT)
Facility: HOSPITAL | Age: 70
End: 2025-02-22
Attending: STUDENT IN AN ORGANIZED HEALTH CARE EDUCATION/TRAINING PROGRAM | Admitting: NURSE PRACTITIONER
Payer: MEDICARE

## 2025-02-22 ENCOUNTER — APPOINTMENT (OUTPATIENT)
Dept: RADIOLOGY | Facility: HOSPITAL | Age: 70
End: 2025-02-22
Payer: MEDICARE

## 2025-02-22 DIAGNOSIS — M86.9 OSTEOMYELITIS OF LEFT FOOT, UNSPECIFIED TYPE (MULTI): Primary | ICD-10-CM

## 2025-02-22 DIAGNOSIS — I48.0 PAROXYSMAL ATRIAL FIBRILLATION (MULTI): ICD-10-CM

## 2025-02-22 DIAGNOSIS — G25.81 RLS (RESTLESS LEGS SYNDROME): ICD-10-CM

## 2025-02-22 LAB
ALBUMIN SERPL BCP-MCNC: 3.9 G/DL (ref 3.4–5)
ALP SERPL-CCNC: 72 U/L (ref 33–136)
ALT SERPL W P-5'-P-CCNC: 13 U/L (ref 7–45)
ANION GAP SERPL CALC-SCNC: 15 MMOL/L (ref 10–20)
AST SERPL W P-5'-P-CCNC: 18 U/L (ref 9–39)
BASOPHILS # BLD AUTO: 0.09 X10*3/UL (ref 0–0.1)
BASOPHILS NFR BLD AUTO: 1.3 %
BILIRUB SERPL-MCNC: 0.2 MG/DL (ref 0–1.2)
BUN SERPL-MCNC: 18 MG/DL (ref 6–23)
CALCIUM SERPL-MCNC: 9.2 MG/DL (ref 8.6–10.6)
CHLORIDE SERPL-SCNC: 103 MMOL/L (ref 98–107)
CO2 SERPL-SCNC: 25 MMOL/L (ref 21–32)
CREAT SERPL-MCNC: 0.72 MG/DL (ref 0.5–1.05)
CRP SERPL-MCNC: 2.82 MG/DL
EGFRCR SERPLBLD CKD-EPI 2021: >90 ML/MIN/1.73M*2
EOSINOPHIL # BLD AUTO: 0.18 X10*3/UL (ref 0–0.7)
EOSINOPHIL NFR BLD AUTO: 2.7 %
ERYTHROCYTE [DISTWIDTH] IN BLOOD BY AUTOMATED COUNT: 11.8 % (ref 11.5–14.5)
ERYTHROCYTE [SEDIMENTATION RATE] IN BLOOD BY WESTERGREN METHOD: 54 MM/H (ref 0–30)
GLUCOSE BLD MANUAL STRIP-MCNC: 243 MG/DL (ref 74–99)
GLUCOSE SERPL-MCNC: 268 MG/DL (ref 74–99)
HCT VFR BLD AUTO: 37.5 % (ref 36–46)
HGB BLD-MCNC: 12.8 G/DL (ref 12–16)
IMM GRANULOCYTES # BLD AUTO: 0.07 X10*3/UL (ref 0–0.7)
IMM GRANULOCYTES NFR BLD AUTO: 1 % (ref 0–0.9)
LACTATE SERPL-SCNC: 1.8 MMOL/L (ref 0.4–2)
LYMPHOCYTES # BLD AUTO: 1.85 X10*3/UL (ref 1.2–4.8)
LYMPHOCYTES NFR BLD AUTO: 27.3 %
MCH RBC QN AUTO: 27.4 PG (ref 26–34)
MCHC RBC AUTO-ENTMCNC: 34.1 G/DL (ref 32–36)
MCV RBC AUTO: 80 FL (ref 80–100)
MONOCYTES # BLD AUTO: 0.57 X10*3/UL (ref 0.1–1)
MONOCYTES NFR BLD AUTO: 8.4 %
NEUTROPHILS # BLD AUTO: 4.01 X10*3/UL (ref 1.2–7.7)
NEUTROPHILS NFR BLD AUTO: 59.3 %
NRBC BLD-RTO: 0 /100 WBCS (ref 0–0)
PLATELET # BLD AUTO: 279 X10*3/UL (ref 150–450)
POTASSIUM SERPL-SCNC: 4 MMOL/L (ref 3.5–5.3)
PROT SERPL-MCNC: 7.5 G/DL (ref 6.4–8.2)
RBC # BLD AUTO: 4.68 X10*6/UL (ref 4–5.2)
SODIUM SERPL-SCNC: 139 MMOL/L (ref 136–145)
WBC # BLD AUTO: 6.8 X10*3/UL (ref 4.4–11.3)

## 2025-02-22 PROCEDURE — 85025 COMPLETE CBC W/AUTO DIFF WBC: CPT | Performed by: PHYSICIAN ASSISTANT

## 2025-02-22 PROCEDURE — 82947 ASSAY GLUCOSE BLOOD QUANT: CPT

## 2025-02-22 PROCEDURE — 83605 ASSAY OF LACTIC ACID: CPT | Performed by: PHYSICIAN ASSISTANT

## 2025-02-22 PROCEDURE — 99285 EMERGENCY DEPT VISIT HI MDM: CPT | Performed by: PHYSICIAN ASSISTANT

## 2025-02-22 PROCEDURE — 73630 X-RAY EXAM OF FOOT: CPT | Mod: LT

## 2025-02-22 PROCEDURE — 85652 RBC SED RATE AUTOMATED: CPT | Performed by: PHYSICIAN ASSISTANT

## 2025-02-22 PROCEDURE — 96374 THER/PROPH/DIAG INJ IV PUSH: CPT

## 2025-02-22 PROCEDURE — 99285 EMERGENCY DEPT VISIT HI MDM: CPT | Performed by: STUDENT IN AN ORGANIZED HEALTH CARE EDUCATION/TRAINING PROGRAM

## 2025-02-22 PROCEDURE — 2500000004 HC RX 250 GENERAL PHARMACY W/ HCPCS (ALT 636 FOR OP/ED): Performed by: PHYSICIAN ASSISTANT

## 2025-02-22 PROCEDURE — 36415 COLL VENOUS BLD VENIPUNCTURE: CPT | Performed by: PHYSICIAN ASSISTANT

## 2025-02-22 PROCEDURE — 73630 X-RAY EXAM OF FOOT: CPT | Mod: LEFT SIDE | Performed by: RADIOLOGY

## 2025-02-22 PROCEDURE — 80053 COMPREHEN METABOLIC PANEL: CPT | Performed by: PHYSICIAN ASSISTANT

## 2025-02-22 PROCEDURE — 96365 THER/PROPH/DIAG IV INF INIT: CPT | Mod: 59

## 2025-02-22 PROCEDURE — 87040 BLOOD CULTURE FOR BACTERIA: CPT | Performed by: PHYSICIAN ASSISTANT

## 2025-02-22 PROCEDURE — 86140 C-REACTIVE PROTEIN: CPT | Performed by: PHYSICIAN ASSISTANT

## 2025-02-22 RX ORDER — VANCOMYCIN 2 GRAM/500 ML IN 0.9 % SODIUM CHLORIDE INTRAVENOUS
2 ONCE
Status: DISCONTINUED | OUTPATIENT
Start: 2025-02-22 | End: 2025-02-22 | Stop reason: RX

## 2025-02-22 RX ORDER — VANCOMYCIN HYDROCHLORIDE 1 G/200ML
1000 INJECTION, SOLUTION INTRAVENOUS ONCE
Status: COMPLETED | OUTPATIENT
Start: 2025-02-22 | End: 2025-02-23

## 2025-02-22 RX ORDER — VANCOMYCIN HYDROCHLORIDE 1 G/200ML
1000 INJECTION, SOLUTION INTRAVENOUS ONCE
Status: COMPLETED | OUTPATIENT
Start: 2025-02-23 | End: 2025-02-23

## 2025-02-22 RX ORDER — KETOROLAC TROMETHAMINE 15 MG/ML
15 INJECTION, SOLUTION INTRAMUSCULAR; INTRAVENOUS ONCE
Status: COMPLETED | OUTPATIENT
Start: 2025-02-22 | End: 2025-02-22

## 2025-02-22 RX ADMIN — KETOROLAC TROMETHAMINE 15 MG: 15 INJECTION, SOLUTION INTRAMUSCULAR; INTRAVENOUS at 22:49

## 2025-02-22 RX ADMIN — PIPERACILLIN SODIUM AND TAZOBACTAM SODIUM 4.5 G: 4; .5 INJECTION, SOLUTION INTRAVENOUS at 23:53

## 2025-02-22 ASSESSMENT — PAIN DESCRIPTION - FREQUENCY: FREQUENCY: CONSTANT/CONTINUOUS

## 2025-02-22 ASSESSMENT — PAIN DESCRIPTION - LOCATION: LOCATION: FOOT

## 2025-02-22 ASSESSMENT — PAIN DESCRIPTION - ONSET: ONSET: ONGOING

## 2025-02-22 ASSESSMENT — PAIN - FUNCTIONAL ASSESSMENT
PAIN_FUNCTIONAL_ASSESSMENT: 0-10
PAIN_FUNCTIONAL_ASSESSMENT: 0-10

## 2025-02-22 ASSESSMENT — PAIN SCALES - GENERAL
PAINLEVEL_OUTOF10: 10 - WORST POSSIBLE PAIN
PAINLEVEL_OUTOF10: 8

## 2025-02-22 ASSESSMENT — COLUMBIA-SUICIDE SEVERITY RATING SCALE - C-SSRS
1. IN THE PAST MONTH, HAVE YOU WISHED YOU WERE DEAD OR WISHED YOU COULD GO TO SLEEP AND NOT WAKE UP?: NO
2. HAVE YOU ACTUALLY HAD ANY THOUGHTS OF KILLING YOURSELF?: NO
6. HAVE YOU EVER DONE ANYTHING, STARTED TO DO ANYTHING, OR PREPARED TO DO ANYTHING TO END YOUR LIFE?: NO

## 2025-02-22 ASSESSMENT — PAIN DESCRIPTION - ORIENTATION: ORIENTATION: LEFT

## 2025-02-22 ASSESSMENT — PAIN DESCRIPTION - PROGRESSION
CLINICAL_PROGRESSION: NOT CHANGED
CLINICAL_PROGRESSION: GRADUALLY IMPROVING

## 2025-02-22 ASSESSMENT — PAIN DESCRIPTION - DESCRIPTORS: DESCRIPTORS: ACHING;DISCOMFORT;SHOOTING;THROBBING

## 2025-02-22 ASSESSMENT — PAIN DESCRIPTION - PAIN TYPE: TYPE: ACUTE PAIN

## 2025-02-23 VITALS
RESPIRATION RATE: 16 BRPM | BODY MASS INDEX: 37.17 KG/M2 | WEIGHT: 202 LBS | DIASTOLIC BLOOD PRESSURE: 83 MMHG | SYSTOLIC BLOOD PRESSURE: 152 MMHG | HEART RATE: 88 BPM | OXYGEN SATURATION: 94 % | HEIGHT: 62 IN | TEMPERATURE: 97.9 F

## 2025-02-23 PROBLEM — M86.9 OSTEOMYELITIS OF LEFT FOOT, UNSPECIFIED TYPE (MULTI): Status: ACTIVE | Noted: 2025-02-23

## 2025-02-23 PROBLEM — M86.9 OSTEOMYELITIS OF LEFT FOOT (MULTI): Status: ACTIVE | Noted: 2025-02-23

## 2025-02-23 PROBLEM — Z85.43 PERSONAL HISTORY OF MALIGNANT NEOPLASM OF OVARY: Status: ACTIVE | Noted: 2022-06-30

## 2025-02-23 PROBLEM — K57.90 DIVERTICULOSIS OF INTESTINE, PART UNSPECIFIED, WITHOUT PERFORATION OR ABSCESS WITHOUT BLEEDING: Status: ACTIVE | Noted: 2024-07-30

## 2025-02-23 PROBLEM — M19.90 OSTEOARTHRITIS: Status: ACTIVE | Noted: 2025-02-23

## 2025-02-23 LAB
ALBUMIN SERPL BCP-MCNC: 3.4 G/DL (ref 3.4–5)
ANION GAP SERPL CALC-SCNC: 13 MMOL/L (ref 10–20)
BACTERIA BLD CULT: NORMAL
BACTERIA BLD CULT: NORMAL
BASOPHILS # BLD AUTO: 0.06 X10*3/UL (ref 0–0.1)
BASOPHILS NFR BLD AUTO: 0.9 %
BUN SERPL-MCNC: 17 MG/DL (ref 6–23)
CALCIUM SERPL-MCNC: 8.5 MG/DL (ref 8.6–10.6)
CHLORIDE SERPL-SCNC: 103 MMOL/L (ref 98–107)
CO2 SERPL-SCNC: 26 MMOL/L (ref 21–32)
CREAT SERPL-MCNC: 0.65 MG/DL (ref 0.5–1.05)
EGFRCR SERPLBLD CKD-EPI 2021: >90 ML/MIN/1.73M*2
EOSINOPHIL # BLD AUTO: 0.2 X10*3/UL (ref 0–0.7)
EOSINOPHIL NFR BLD AUTO: 2.9 %
ERYTHROCYTE [DISTWIDTH] IN BLOOD BY AUTOMATED COUNT: 11.9 % (ref 11.5–14.5)
GLUCOSE BLD MANUAL STRIP-MCNC: 218 MG/DL (ref 74–99)
GLUCOSE BLD MANUAL STRIP-MCNC: 247 MG/DL (ref 74–99)
GLUCOSE BLD MANUAL STRIP-MCNC: 269 MG/DL (ref 74–99)
GLUCOSE BLD MANUAL STRIP-MCNC: 373 MG/DL (ref 74–99)
GLUCOSE SERPL-MCNC: 246 MG/DL (ref 74–99)
GRAM STN SPEC: NORMAL
GRAM STN SPEC: NORMAL
HCT VFR BLD AUTO: 34.2 % (ref 36–46)
HGB BLD-MCNC: 11.4 G/DL (ref 12–16)
IMM GRANULOCYTES # BLD AUTO: 0.08 X10*3/UL (ref 0–0.7)
IMM GRANULOCYTES NFR BLD AUTO: 1.2 % (ref 0–0.9)
LACTATE SERPL-SCNC: 1.7 MMOL/L (ref 0.4–2)
LYMPHOCYTES # BLD AUTO: 2.05 X10*3/UL (ref 1.2–4.8)
LYMPHOCYTES NFR BLD AUTO: 30 %
MAGNESIUM SERPL-MCNC: 1.72 MG/DL (ref 1.6–2.4)
MCH RBC QN AUTO: 26.5 PG (ref 26–34)
MCHC RBC AUTO-ENTMCNC: 33.3 G/DL (ref 32–36)
MCV RBC AUTO: 79 FL (ref 80–100)
MONOCYTES # BLD AUTO: 0.55 X10*3/UL (ref 0.1–1)
MONOCYTES NFR BLD AUTO: 8 %
NEUTROPHILS # BLD AUTO: 3.9 X10*3/UL (ref 1.2–7.7)
NEUTROPHILS NFR BLD AUTO: 57 %
NRBC BLD-RTO: 0 /100 WBCS (ref 0–0)
PHOSPHATE SERPL-MCNC: 3.9 MG/DL (ref 2.5–4.9)
PLATELET # BLD AUTO: 236 X10*3/UL (ref 150–450)
POTASSIUM SERPL-SCNC: 3.6 MMOL/L (ref 3.5–5.3)
RBC # BLD AUTO: 4.31 X10*6/UL (ref 4–5.2)
SODIUM SERPL-SCNC: 138 MMOL/L (ref 136–145)
WBC # BLD AUTO: 6.8 X10*3/UL (ref 4.4–11.3)

## 2025-02-23 PROCEDURE — 80069 RENAL FUNCTION PANEL: CPT | Performed by: NURSE PRACTITIONER

## 2025-02-23 PROCEDURE — 85025 COMPLETE CBC W/AUTO DIFF WBC: CPT | Performed by: NURSE PRACTITIONER

## 2025-02-23 PROCEDURE — 82947 ASSAY GLUCOSE BLOOD QUANT: CPT

## 2025-02-23 PROCEDURE — 2500000002 HC RX 250 W HCPCS SELF ADMINISTERED DRUGS (ALT 637 FOR MEDICARE OP, ALT 636 FOR OP/ED): Performed by: NURSE PRACTITIONER

## 2025-02-23 PROCEDURE — 96375 TX/PRO/DX INJ NEW DRUG ADDON: CPT

## 2025-02-23 PROCEDURE — 2500000004 HC RX 250 GENERAL PHARMACY W/ HCPCS (ALT 636 FOR OP/ED): Performed by: PHYSICIAN ASSISTANT

## 2025-02-23 PROCEDURE — 36415 COLL VENOUS BLD VENIPUNCTURE: CPT | Performed by: NURSE PRACTITIONER

## 2025-02-23 PROCEDURE — 2500000002 HC RX 250 W HCPCS SELF ADMINISTERED DRUGS (ALT 637 FOR MEDICARE OP, ALT 636 FOR OP/ED): Performed by: STUDENT IN AN ORGANIZED HEALTH CARE EDUCATION/TRAINING PROGRAM

## 2025-02-23 PROCEDURE — 83735 ASSAY OF MAGNESIUM: CPT | Performed by: NURSE PRACTITIONER

## 2025-02-23 PROCEDURE — 2500000004 HC RX 250 GENERAL PHARMACY W/ HCPCS (ALT 636 FOR OP/ED): Performed by: NURSE PRACTITIONER

## 2025-02-23 PROCEDURE — 1100000001 HC PRIVATE ROOM DAILY

## 2025-02-23 PROCEDURE — 83605 ASSAY OF LACTIC ACID: CPT | Performed by: NURSE PRACTITIONER

## 2025-02-23 PROCEDURE — 84100 ASSAY OF PHOSPHORUS: CPT | Performed by: NURSE PRACTITIONER

## 2025-02-23 PROCEDURE — 99223 1ST HOSP IP/OBS HIGH 75: CPT | Performed by: NURSE PRACTITIONER

## 2025-02-23 PROCEDURE — 2500000001 HC RX 250 WO HCPCS SELF ADMINISTERED DRUGS (ALT 637 FOR MEDICARE OP): Performed by: NURSE PRACTITIONER

## 2025-02-23 PROCEDURE — 87205 SMEAR GRAM STAIN: CPT | Performed by: NURSE PRACTITIONER

## 2025-02-23 PROCEDURE — 2500000001 HC RX 250 WO HCPCS SELF ADMINISTERED DRUGS (ALT 637 FOR MEDICARE OP): Performed by: STUDENT IN AN ORGANIZED HEALTH CARE EDUCATION/TRAINING PROGRAM

## 2025-02-23 PROCEDURE — 87070 CULTURE OTHR SPECIMN AEROBIC: CPT | Performed by: NURSE PRACTITIONER

## 2025-02-23 RX ORDER — ROSUVASTATIN CALCIUM 20 MG/1
20 TABLET, COATED ORAL
COMMUNITY
Start: 2024-12-20 | End: 2025-12-20

## 2025-02-23 RX ORDER — TRIAMTERENE/HYDROCHLOROTHIAZID 37.5-25 MG
1 TABLET ORAL DAILY
Status: DISCONTINUED | OUTPATIENT
Start: 2025-02-23 | End: 2025-02-24 | Stop reason: HOSPADM

## 2025-02-23 RX ORDER — TRIAMTERENE AND HYDROCHLOROTHIAZIDE 37.5; 25 MG/1; MG/1
1 CAPSULE ORAL
COMMUNITY
Start: 2025-01-27

## 2025-02-23 RX ORDER — DEXTROSE 50 % IN WATER (D50W) INTRAVENOUS SYRINGE
25
Status: DISCONTINUED | OUTPATIENT
Start: 2025-02-23 | End: 2025-02-24 | Stop reason: HOSPADM

## 2025-02-23 RX ORDER — GLIPIZIDE 5 MG/1
5 TABLET ORAL DAILY
Status: DISCONTINUED | OUTPATIENT
Start: 2025-02-23 | End: 2025-02-23

## 2025-02-23 RX ORDER — CELECOXIB 100 MG/1
100 CAPSULE ORAL EVERY 12 HOURS
COMMUNITY
Start: 2025-01-06 | End: 2025-02-24 | Stop reason: HOSPADM

## 2025-02-23 RX ORDER — OXYCODONE HYDROCHLORIDE 5 MG/1
5 TABLET ORAL EVERY 4 HOURS PRN
Status: DISPENSED | OUTPATIENT
Start: 2025-02-23 | End: 2025-02-24

## 2025-02-23 RX ORDER — POLYETHYLENE GLYCOL 3350 17 G/17G
17 POWDER, FOR SOLUTION ORAL DAILY PRN
Status: DISCONTINUED | OUTPATIENT
Start: 2025-02-23 | End: 2025-02-24 | Stop reason: HOSPADM

## 2025-02-23 RX ORDER — GABAPENTIN 400 MG/1
400 CAPSULE ORAL 3 TIMES DAILY
COMMUNITY
Start: 2025-02-15

## 2025-02-23 RX ORDER — BACLOFEN 10 MG/1
10 TABLET ORAL NIGHTLY
Status: DISCONTINUED | OUTPATIENT
Start: 2025-02-23 | End: 2025-02-23

## 2025-02-23 RX ORDER — VANCOMYCIN HYDROCHLORIDE 1 G/20ML
INJECTION, POWDER, LYOPHILIZED, FOR SOLUTION INTRAVENOUS DAILY PRN
Status: DISCONTINUED | OUTPATIENT
Start: 2025-02-23 | End: 2025-02-24 | Stop reason: HOSPADM

## 2025-02-23 RX ORDER — MORPHINE SULFATE 4 MG/ML
2 INJECTION INTRAVENOUS ONCE
Status: COMPLETED | OUTPATIENT
Start: 2025-02-23 | End: 2025-02-23

## 2025-02-23 RX ORDER — DEXTROSE 50 % IN WATER (D50W) INTRAVENOUS SYRINGE
12.5
Status: DISCONTINUED | OUTPATIENT
Start: 2025-02-23 | End: 2025-02-24 | Stop reason: HOSPADM

## 2025-02-23 RX ORDER — BACLOFEN 10 MG/1
10 TABLET ORAL 3 TIMES DAILY PRN
Status: DISCONTINUED | OUTPATIENT
Start: 2025-02-23 | End: 2025-02-24 | Stop reason: HOSPADM

## 2025-02-23 RX ORDER — ACETAMINOPHEN 500 MG
10 TABLET ORAL NIGHTLY PRN
Status: DISCONTINUED | OUTPATIENT
Start: 2025-02-23 | End: 2025-02-24 | Stop reason: HOSPADM

## 2025-02-23 RX ORDER — VANCOMYCIN HYDROCHLORIDE
1250 EVERY 12 HOURS
Status: DISCONTINUED | OUTPATIENT
Start: 2025-02-23 | End: 2025-02-24 | Stop reason: HOSPADM

## 2025-02-23 RX ORDER — ROSUVASTATIN CALCIUM 20 MG/1
20 TABLET, COATED ORAL NIGHTLY
Status: DISCONTINUED | OUTPATIENT
Start: 2025-02-23 | End: 2025-02-24 | Stop reason: HOSPADM

## 2025-02-23 RX ORDER — INSULIN LISPRO 100 [IU]/ML
0-15 INJECTION, SOLUTION INTRAVENOUS; SUBCUTANEOUS
Status: DISCONTINUED | OUTPATIENT
Start: 2025-02-23 | End: 2025-02-24 | Stop reason: HOSPADM

## 2025-02-23 RX ORDER — INSULIN LISPRO 100 [IU]/ML
0-10 INJECTION, SOLUTION INTRAVENOUS; SUBCUTANEOUS
Status: DISCONTINUED | OUTPATIENT
Start: 2025-02-23 | End: 2025-02-23

## 2025-02-23 RX ORDER — ACETAMINOPHEN 325 MG/1
650 TABLET ORAL EVERY 6 HOURS PRN
Status: DISCONTINUED | OUTPATIENT
Start: 2025-02-23 | End: 2025-02-24 | Stop reason: HOSPADM

## 2025-02-23 RX ORDER — BACLOFEN 10 MG/1
10 TABLET ORAL ONCE
Status: COMPLETED | OUTPATIENT
Start: 2025-02-23 | End: 2025-02-23

## 2025-02-23 RX ORDER — EMPAGLIFLOZIN 10 MG/1
10 TABLET, FILM COATED ORAL
COMMUNITY
Start: 2024-12-20 | End: 2025-02-23 | Stop reason: ALTCHOICE

## 2025-02-23 RX ORDER — BACLOFEN 10 MG/1
1 TABLET ORAL NIGHTLY
COMMUNITY
Start: 2024-12-10

## 2025-02-23 RX ADMIN — APIXABAN 5 MG: 5 TABLET, FILM COATED ORAL at 21:57

## 2025-02-23 RX ADMIN — BACLOFEN 10 MG: 10 TABLET ORAL at 01:32

## 2025-02-23 RX ADMIN — HYDROMORPHONE HYDROCHLORIDE 0.5 MG: 0.5 INJECTION, SOLUTION INTRAMUSCULAR; INTRAVENOUS; SUBCUTANEOUS at 02:36

## 2025-02-23 RX ADMIN — VANCOMYCIN HYDROCHLORIDE 1000 MG: 1 INJECTION, SOLUTION INTRAVENOUS at 01:32

## 2025-02-23 RX ADMIN — ACETAMINOPHEN 650 MG: 325 TABLET ORAL at 11:09

## 2025-02-23 RX ADMIN — MORPHINE SULFATE 2 MG: 4 INJECTION INTRAVENOUS at 00:41

## 2025-02-23 RX ADMIN — INSULIN LISPRO 4 UNITS: 100 INJECTION, SOLUTION INTRAVENOUS; SUBCUTANEOUS at 11:11

## 2025-02-23 RX ADMIN — BACLOFEN 10 MG: 10 TABLET ORAL at 05:18

## 2025-02-23 RX ADMIN — INSULIN LISPRO 15 UNITS: 100 INJECTION, SOLUTION INTRAVENOUS; SUBCUTANEOUS at 17:44

## 2025-02-23 RX ADMIN — PIPERACILLIN SODIUM AND TAZOBACTAM SODIUM 3.38 G: 3; .375 INJECTION, SOLUTION INTRAVENOUS at 05:18

## 2025-02-23 RX ADMIN — PIPERACILLIN SODIUM AND TAZOBACTAM SODIUM 3.38 G: 3; .375 INJECTION, SOLUTION INTRAVENOUS at 11:12

## 2025-02-23 RX ADMIN — Medication 1250 MG: at 21:57

## 2025-02-23 RX ADMIN — GABAPENTIN 400 MG: 300 CAPSULE ORAL at 21:57

## 2025-02-23 RX ADMIN — TRIAMTERENE AND HYDROCHLOROTHIAZIDE 1 TABLET: 37.5; 25 TABLET ORAL at 11:09

## 2025-02-23 RX ADMIN — OXYCODONE 5 MG: 5 TABLET ORAL at 13:42

## 2025-02-23 RX ADMIN — GABAPENTIN 400 MG: 300 CAPSULE ORAL at 08:07

## 2025-02-23 RX ADMIN — SODIUM CHLORIDE, POTASSIUM CHLORIDE, SODIUM LACTATE AND CALCIUM CHLORIDE 1000 ML: 600; 310; 30; 20 INJECTION, SOLUTION INTRAVENOUS at 00:19

## 2025-02-23 RX ADMIN — PIPERACILLIN SODIUM AND TAZOBACTAM SODIUM 3.38 G: 3; .375 INJECTION, SOLUTION INTRAVENOUS at 17:38

## 2025-02-23 RX ADMIN — APIXABAN 5 MG: 5 TABLET, FILM COATED ORAL at 08:08

## 2025-02-23 RX ADMIN — VANCOMYCIN HYDROCHLORIDE 1000 MG: 1 INJECTION, SOLUTION INTRAVENOUS at 00:19

## 2025-02-23 RX ADMIN — OXYCODONE 5 MG: 5 TABLET ORAL at 17:33

## 2025-02-23 RX ADMIN — OXYCODONE 5 MG: 5 TABLET ORAL at 07:20

## 2025-02-23 RX ADMIN — INSULIN LISPRO 4 UNITS: 100 INJECTION, SOLUTION INTRAVENOUS; SUBCUTANEOUS at 06:37

## 2025-02-23 SDOH — SOCIAL STABILITY: SOCIAL INSECURITY: WITHIN THE LAST YEAR, HAVE YOU BEEN AFRAID OF YOUR PARTNER OR EX-PARTNER?: NO

## 2025-02-23 SDOH — SOCIAL STABILITY: SOCIAL INSECURITY
WITHIN THE LAST YEAR, HAVE YOU BEEN KICKED, HIT, SLAPPED, OR OTHERWISE PHYSICALLY HURT BY YOUR PARTNER OR EX-PARTNER?: NO

## 2025-02-23 SDOH — SOCIAL STABILITY: SOCIAL INSECURITY
WITHIN THE LAST YEAR, HAVE YOU BEEN RAPED OR FORCED TO HAVE ANY KIND OF SEXUAL ACTIVITY BY YOUR PARTNER OR EX-PARTNER?: NO

## 2025-02-23 SDOH — SOCIAL STABILITY: SOCIAL INSECURITY: WITHIN THE LAST YEAR, HAVE YOU BEEN HUMILIATED OR EMOTIONALLY ABUSED IN OTHER WAYS BY YOUR PARTNER OR EX-PARTNER?: NO

## 2025-02-23 SDOH — SOCIAL STABILITY: SOCIAL INSECURITY: HAVE YOU HAD THOUGHTS OF HARMING ANYONE ELSE?: NO

## 2025-02-23 SDOH — SOCIAL STABILITY: SOCIAL INSECURITY: WERE YOU ABLE TO COMPLETE ALL THE BEHAVIORAL HEALTH SCREENINGS?: YES

## 2025-02-23 SDOH — HEALTH STABILITY: MENTAL HEALTH
DO YOU FEEL STRESS - TENSE, RESTLESS, NERVOUS, OR ANXIOUS, OR UNABLE TO SLEEP AT NIGHT BECAUSE YOUR MIND IS TROUBLED ALL THE TIME - THESE DAYS?: NOT AT ALL

## 2025-02-23 SDOH — SOCIAL STABILITY: SOCIAL INSECURITY: ARE YOU OR HAVE YOU BEEN THREATENED OR ABUSED PHYSICALLY, EMOTIONALLY, OR SEXUALLY BY ANYONE?: NO

## 2025-02-23 SDOH — ECONOMIC STABILITY: INCOME INSECURITY: IN THE PAST 12 MONTHS HAS THE ELECTRIC, GAS, OIL, OR WATER COMPANY THREATENED TO SHUT OFF SERVICES IN YOUR HOME?: NO

## 2025-02-23 SDOH — ECONOMIC STABILITY: HOUSING INSECURITY: AT ANY TIME IN THE PAST 12 MONTHS, WERE YOU HOMELESS OR LIVING IN A SHELTER (INCLUDING NOW)?: NO

## 2025-02-23 SDOH — HEALTH STABILITY: PHYSICAL HEALTH: ON AVERAGE, HOW MANY MINUTES DO YOU ENGAGE IN EXERCISE AT THIS LEVEL?: 0 MIN

## 2025-02-23 SDOH — ECONOMIC STABILITY: FOOD INSECURITY: HOW HARD IS IT FOR YOU TO PAY FOR THE VERY BASICS LIKE FOOD, HOUSING, MEDICAL CARE, AND HEATING?: NOT HARD AT ALL

## 2025-02-23 SDOH — SOCIAL STABILITY: SOCIAL INSECURITY: HAS ANYONE EVER THREATENED TO HURT YOUR FAMILY OR YOUR PETS?: NO

## 2025-02-23 SDOH — ECONOMIC STABILITY: HOUSING INSECURITY: IN THE LAST 12 MONTHS, WAS THERE A TIME WHEN YOU WERE NOT ABLE TO PAY THE MORTGAGE OR RENT ON TIME?: NO

## 2025-02-23 SDOH — HEALTH STABILITY: PHYSICAL HEALTH: ON AVERAGE, HOW MANY DAYS PER WEEK DO YOU ENGAGE IN MODERATE TO STRENUOUS EXERCISE (LIKE A BRISK WALK)?: 0 DAYS

## 2025-02-23 SDOH — SOCIAL STABILITY: SOCIAL INSECURITY: DO YOU FEEL UNSAFE GOING BACK TO THE PLACE WHERE YOU ARE LIVING?: NO

## 2025-02-23 SDOH — ECONOMIC STABILITY: FOOD INSECURITY: WITHIN THE PAST 12 MONTHS, YOU WORRIED THAT YOUR FOOD WOULD RUN OUT BEFORE YOU GOT THE MONEY TO BUY MORE.: NEVER TRUE

## 2025-02-23 SDOH — SOCIAL STABILITY: SOCIAL INSECURITY: DO YOU FEEL ANYONE HAS EXPLOITED OR TAKEN ADVANTAGE OF YOU FINANCIALLY OR OF YOUR PERSONAL PROPERTY?: NO

## 2025-02-23 SDOH — SOCIAL STABILITY: SOCIAL INSECURITY: DOES ANYONE TRY TO KEEP YOU FROM HAVING/CONTACTING OTHER FRIENDS OR DOING THINGS OUTSIDE YOUR HOME?: NO

## 2025-02-23 SDOH — ECONOMIC STABILITY: FOOD INSECURITY: WITHIN THE PAST 12 MONTHS, THE FOOD YOU BOUGHT JUST DIDN'T LAST AND YOU DIDN'T HAVE MONEY TO GET MORE.: NEVER TRUE

## 2025-02-23 SDOH — SOCIAL STABILITY: SOCIAL INSECURITY: ABUSE: ADULT

## 2025-02-23 SDOH — SOCIAL STABILITY: SOCIAL INSECURITY: ARE THERE ANY APPARENT SIGNS OF INJURIES/BEHAVIORS THAT COULD BE RELATED TO ABUSE/NEGLECT?: NO

## 2025-02-23 SDOH — SOCIAL STABILITY: SOCIAL INSECURITY: HAVE YOU HAD ANY THOUGHTS OF HARMING ANYONE ELSE?: NO

## 2025-02-23 ASSESSMENT — COGNITIVE AND FUNCTIONAL STATUS - GENERAL
PATIENT BASELINE BEDBOUND: NO
DAILY ACTIVITIY SCORE: 24
MOBILITY SCORE: 24
DAILY ACTIVITIY SCORE: 24
DAILY ACTIVITIY SCORE: 24
MOBILITY SCORE: 24
MOBILITY SCORE: 24

## 2025-02-23 ASSESSMENT — PAIN SCALES - GENERAL
PAINLEVEL_OUTOF10: 5 - MODERATE PAIN
PAINLEVEL_OUTOF10: 7
PAINLEVEL_OUTOF10: 4
PAINLEVEL_OUTOF10: 7
PAINLEVEL_OUTOF10: 8
PAINLEVEL_OUTOF10: 7
PAINLEVEL_OUTOF10: 0 - NO PAIN
PAINLEVEL_OUTOF10: 8
PAINLEVEL_OUTOF10: 7
PAINLEVEL_OUTOF10: 8
PAINLEVEL_OUTOF10: 5 - MODERATE PAIN

## 2025-02-23 ASSESSMENT — ACTIVITIES OF DAILY LIVING (ADL)
WALKS IN HOME: INDEPENDENT
HEARING - RIGHT EAR: FUNCTIONAL
LACK_OF_TRANSPORTATION: NO
HEARING - RIGHT EAR: FUNCTIONAL
DRESSING YOURSELF: INDEPENDENT
HEARING - LEFT EAR: FUNCTIONAL
TOILETING: INDEPENDENT
GROOMING: INDEPENDENT
DRESSING YOURSELF: INDEPENDENT
PATIENT'S MEMORY ADEQUATE TO SAFELY COMPLETE DAILY ACTIVITIES?: YES
PATIENT'S MEMORY ADEQUATE TO SAFELY COMPLETE DAILY ACTIVITIES?: YES
WALKS IN HOME: INDEPENDENT
FEEDING YOURSELF: INDEPENDENT
ADEQUATE_TO_COMPLETE_ADL: YES
LACK_OF_TRANSPORTATION: NO
JUDGMENT_ADEQUATE_SAFELY_COMPLETE_DAILY_ACTIVITIES: YES
TOILETING: INDEPENDENT
FEEDING YOURSELF: INDEPENDENT
BATHING: INDEPENDENT
HEARING - LEFT EAR: FUNCTIONAL
BATHING: INDEPENDENT
GROOMING: INDEPENDENT
ADEQUATE_TO_COMPLETE_ADL: YES

## 2025-02-23 ASSESSMENT — ENCOUNTER SYMPTOMS
CHILLS: 0
WEAKNESS: 0
DIARRHEA: 0
SHORTNESS OF BREATH: 0
FEVER: 0
NAUSEA: 0
POLYPHAGIA: 0
POLYDIPSIA: 0
CONFUSION: 0
DIFFICULTY URINATING: 0
VOMITING: 0
ABDOMINAL PAIN: 0
COUGH: 0
DIZZINESS: 0
HEADACHES: 0

## 2025-02-23 ASSESSMENT — LIFESTYLE VARIABLES
HOW OFTEN DO YOU HAVE 6 OR MORE DRINKS ON ONE OCCASION: NEVER
AUDIT-C TOTAL SCORE: 0
SKIP TO QUESTIONS 9-10: 1
HOW OFTEN DO YOU HAVE A DRINK CONTAINING ALCOHOL: NEVER
AUDIT-C TOTAL SCORE: 0
HOW MANY STANDARD DRINKS CONTAINING ALCOHOL DO YOU HAVE ON A TYPICAL DAY: PATIENT DOES NOT DRINK

## 2025-02-23 ASSESSMENT — PAIN - FUNCTIONAL ASSESSMENT
PAIN_FUNCTIONAL_ASSESSMENT: 0-10

## 2025-02-23 ASSESSMENT — PAIN DESCRIPTION - ORIENTATION: ORIENTATION: LEFT

## 2025-02-23 ASSESSMENT — PATIENT HEALTH QUESTIONNAIRE - PHQ9
1. LITTLE INTEREST OR PLEASURE IN DOING THINGS: NOT AT ALL
SUM OF ALL RESPONSES TO PHQ9 QUESTIONS 1 & 2: 0
2. FEELING DOWN, DEPRESSED OR HOPELESS: NOT AT ALL

## 2025-02-23 ASSESSMENT — PAIN DESCRIPTION - LOCATION: LOCATION: FOOT

## 2025-02-23 ASSESSMENT — PAIN DESCRIPTION - PROGRESSION: CLINICAL_PROGRESSION: RAPIDLY IMPROVING

## 2025-02-23 NOTE — ED TRIAGE NOTES
Patient presents to the ED with complaint of a blister on her left foot. Patient is unsure if she burned her foot, states she has diabetic shoes and the shoe has been rubbing against the top of her foot, has been experiencing pain for 2 days. PMH of DM II, left great toe amputation 6/2024. Glucose 243 in triage

## 2025-02-23 NOTE — H&P
History Of Present Illness  Ramya Muller is a 69 y.o. female with a PMH of ovarian cancer, pAFib, HTN, TATO on CPAP, T2DM (last HgA1c 9.65 12/20/24), GERD, arthritis, and fibromyalgia. Pt presented to ED for further evaluation of blister at site of L great toe amputation. She also endorses severe left foot pain and she is not sure if her diabetic shoe was rubbing against her foot. Pt reports that blister burst prior to ED presentation and it had williams colored drainage. Pain radiates from site of prior amputation on her left foot up to her knee which she rates as 7/10. Labs obtained as part of ED workup reviewed. Labs notable for hyperglycemia (serum glucose 268) and elevated inflammatory markers. Xray imaging of L foot showing findings suggestive of osteomyelitis at 1st metatarsal site. Blood cultures x2 obtained while in ED prior to initiation of IV antibiotics for treatment of osteomyelitis. See media tab in pt chart for pics of left foot on admit. Pt admitted to medicine for treatment of osteomyelitis of left foot.    ED interventions: Vancomycin 1g x2 doses, Morphine 2 mg IV push x1 dose, Toradol 15 mg IV push x1 dose, and Zosyn 4.5g x1 dose     Past Medical History  Past Medical History:   Diagnosis Date    Atrial fibrillation (Multi) 06/30/2022    Cellulitis     Depressive disorder 09/23/2024    Fibromyalgia 07/23/2024    Gastroesophageal reflux disease 08/08/2016    Last Assessment & Plan:   Continue PPI      HTN (hypertension)     Hypertensive chronic kidney disease with stage 1 through stage 4 chronic kidney disease, or unspecified chronic kidney disease 07/30/2024    Mixed hyperlipidemia 07/13/2022    TATO (obstructive sleep apnea) 12/05/2012    Plan:  CPAP as needed      Plan:  CPAP as needed     Last Assessment & Plan:   Reports compliance with PAP therapy  Continue AuoPAP      Osteomyelitis 07/18/2024    LT GREAT TOE    Stage 3a chronic kidney disease (Multi) 12/06/2022    Type 2 diabetes mellitus         Surgical History  Past Surgical History:   Procedure Laterality Date    HYSTERECTOMY      MYOMECTOMY          Social History  She reports that she has never smoked. She has never been exposed to tobacco smoke. She has never used smokeless tobacco. She reports that she does not currently use alcohol. She reports that she does not use drugs.    Family History  No family history on file.     Allergies  Atorvastatin, Pregabalin, Empagliflozin, and Metformin    Review of Systems   Constitutional:  Negative for chills and fever.   Respiratory:  Negative for cough and shortness of breath.    Cardiovascular:  Negative for chest pain.   Gastrointestinal:  Negative for abdominal pain, diarrhea, nausea and vomiting.   Endocrine: Negative for polydipsia, polyphagia and polyuria.   Genitourinary:  Negative for difficulty urinating.   Musculoskeletal:  Positive for gait problem.        Left foot pain   Neurological:  Negative for dizziness, weakness and headaches.   Psychiatric/Behavioral:  Negative for confusion.         Physical Exam  Constitutional:       General: She is not in acute distress.  HENT:      Head: Normocephalic.      Nose: Nose normal.      Mouth/Throat:      Mouth: Mucous membranes are dry.   Cardiovascular:      Rate and Rhythm: Rhythm irregular.      Pulses: Normal pulses.   Pulmonary:      Effort: Pulmonary effort is normal. No respiratory distress.      Breath sounds: Normal breath sounds.   Abdominal:      General: Bowel sounds are normal.      Palpations: Abdomen is soft.   Musculoskeletal:         General: Normal range of motion.      Cervical back: Normal range of motion.   Skin:     General: Skin is warm and dry.      Comments: L great toe amputation site blister   Neurological:      Mental Status: She is alert and oriented to person, place, and time.          Last Recorded Vitals  Blood pressure 142/78, pulse 88, temperature 37 °C (98.6 °F), temperature source Oral, resp. rate 18, height 1.575 m  "(5' 2\"), weight 91.6 kg (202 lb), SpO2 99%.    Relevant Results  XR foot left 3+ views    Result Date: 2/23/2025  Interpreted By:  Finkelstein, Evan,  and Ric Pinzon STUDY: XR FOOT LEFT 3+ VIEWS; ;  2/22/2025 11:28 pm   INDICATION: Signs/Symptoms:L foot infection.     COMPARISON: Radiographs of the left foot 07/18/2024   ACCESSION NUMBER(S): [id number]   ORDERING CLINICIAN: ALEJANDRO DUTTA   FINDINGS: Three views of the left foot.   Interval transmetatarsal amputation of the great toe. Extensive cortical irregularity and erosive changes of the distal 1st metatarsal. No acute fracture or malalignment. Mild degenerative changes of the midfoot. Achilles insertion and plantar calcaneal enthesophytes noted. Extensive vascular calcifications. No soft tissue gas.       Cortical irregularity and erosions of the distal 1st metatarsal compatible with osteomyelitis.   I personally reviewed the images/study and I agree with Alberta Larios DO's (radiology resident) findings as stated. This study was interpreted at [place], [place].   MACRO: None   Signed by: Evan Finkelstein 2/23/2025 12:32 AM Dictation workstation:   [id number]    Results for orders placed or performed during the hospital encounter of 02/22/25 (from the past 24 hours)   POCT GLUCOSE   Result Value Ref Range    POCT Glucose 243 (H) 74 - 99 mg/dL   CBC and Auto Differential   Result Value Ref Range    WBC 6.8 4.4 - 11.3 x10*3/uL    nRBC 0.0 0.0 - 0.0 /100 WBCs    RBC 4.68 4.00 - 5.20 x10*6/uL    Hemoglobin 12.8 12.0 - 16.0 g/dL    Hematocrit 37.5 36.0 - 46.0 %    MCV 80 80 - 100 fL    MCH 27.4 26.0 - 34.0 pg    MCHC 34.1 32.0 - 36.0 g/dL    RDW 11.8 11.5 - 14.5 %    Platelets 279 150 - 450 x10*3/uL    Neutrophils % 59.3 40.0 - 80.0 %    Immature Granulocytes %, Automated 1.0 (H) 0.0 - 0.9 %    Lymphocytes % 27.3 13.0 - 44.0 %    Monocytes % 8.4 2.0 - 10.0 %    Eosinophils % 2.7 0.0 - 6.0 %    Basophils " % 1.3 0.0 - 2.0 %    Neutrophils Absolute 4.01 1.20 - 7.70 x10*3/uL    Immature Granulocytes Absolute, Automated 0.07 0.00 - 0.70 x10*3/uL    Lymphocytes Absolute 1.85 1.20 - 4.80 x10*3/uL    Monocytes Absolute 0.57 0.10 - 1.00 x10*3/uL    Eosinophils Absolute 0.18 0.00 - 0.70 x10*3/uL    Basophils Absolute 0.09 0.00 - 0.10 x10*3/uL   Comprehensive metabolic panel   Result Value Ref Range    Glucose 268 (H) 74 - 99 mg/dL    Sodium 139 136 - 145 mmol/L    Potassium 4.0 3.5 - 5.3 mmol/L    Chloride 103 98 - 107 mmol/L    Bicarbonate 25 21 - 32 mmol/L    Anion Gap 15 10 - 20 mmol/L    Urea Nitrogen 18 6 - 23 mg/dL    Creatinine 0.72 0.50 - 1.05 mg/dL    eGFR >90 >60 mL/min/1.73m*2    Calcium 9.2 8.6 - 10.6 mg/dL    Albumin 3.9 3.4 - 5.0 g/dL    Alkaline Phosphatase 72 33 - 136 U/L    Total Protein 7.5 6.4 - 8.2 g/dL    AST 18 9 - 39 U/L    Bilirubin, Total 0.2 0.0 - 1.2 mg/dL    ALT 13 7 - 45 U/L   Lactate   Result Value Ref Range    Lactate 1.8 0.4 - 2.0 mmol/L   Sedimentation rate, automated   Result Value Ref Range    Sedimentation Rate 54 (H) 0 - 30 mm/h   C-reactive protein   Result Value Ref Range    C-Reactive Protein 2.82 (H) <1.00 mg/dL      Lab Results   Component Value Date    HGBA1C 9.6 (A) 12/20/2024       Scheduled medications  apixaban, 5 mg, oral, BID  gabapentin, 400 mg, oral, TID  HYDROmorphone, 0.5 mg, intravenous, Once  insulin lispro, 0-10 Units, subcutaneous, TID AC  lactated Ringer's, 1,000 mL, intravenous, Once  piperacillin-tazobactam, 3.375 g, intravenous, q6h  rosuvastatin, 20 mg, oral, Nightly  triamterene-hydrochlorothiazid, 1 tablet, oral, Daily  vancomycin, 1,000 mg, intravenous, Once  vancomycin, 1,250 mg, intravenous, q12h      Continuous medications     PRN medications  PRN medications: acetaminophen, baclofen, dextrose, dextrose, glucagon, glucagon, melatonin, oxyCODONE, polyethylene glycol, vancomycin    Assessment/Plan     Ramya Muller is a 69 year old female with a PMH of  ovarian cancer, pAFib, HTN, TATO on CPAP, T2DM (last HgA1c 9.65 12/20/24), GERD, arthritis, and fibromyalgia. Pt presented to ED for further evaluation of blister at site of L great toe amputation. She also endorses severe left foot pain and she is not sure if her diabetic shoe was rubbing against her foot. Pain radiates from site of prior amputation on her left foot up to her knee. Labs obtained as part of ED workup reviewed. Xray imaging of L foot showing findings suggestive of osteomyelitis at 1st metatarsal site. Blood cultures x2 obtained while in ED prior to initiation of IV antibiotics for treatment of osteomyelitis. Pt admitted to medicine for treatment of osteomyelitis of left foot.    Osteomyelitis of left foot  - X-ray of L foot: Cortical irregularity and erosions of the distal 1st metatarsal compatible with osteomyelitis.  - Antibiotic Regimen: Vancomycin Rph to dose (2/23-) and Zosyn 3.375g q6h (2/22-)   - Pain regimen: Tylenol 975 mg PO Q6hr PRN mild & Oxycodone 5 mg q4h PRN mod-severe pain (reassess in 24 hrs)  - Infectious workup done on admit: ESR 54, CRP 2.82, WBC 6.8, lactate 1.8, blood cultures x2  - Wound culture pending collection  - consult Podiatry for evaluation in AM  - Consider Infectious Disease consult for ongoing antibiotic treatment plan if necessary     T2DM c/b neuropathy (last HgA1c 9.6% 12/20/24)  - home regimen: Glipizide 5 mg PO every day; per chart review pt was supposed to start Semaglutide but there is no fill hx for medication  - holding oral antiglycemic agent in case intervention is required once pt is seen by podiatry  - accucheck ACHS   - Lispro SSI 0-10 units  - diabetic diet  - hypoglycemic protocol ordered  - c/w home dose of Gabapentin 400 mg PO TID (last filled via [facility] 2/15/25 90 capsules/30 day supply)    pAFib  - Per documentation from PharmD (Najma Strickland) on 1/16/25: Today's appointment was initial visit to establish care with Clinical Pharmacy. Ramya has not  started Eliquis due to the copay. Will send prescription to  [facility] to utilize a 30 day free trial voucher while  PAP application is pending. Counseled patient on twice daily dosing and signs/symptoms of bleeding. Will follow up in 1 month to ensure tolerability of Eliquis and  PAP approval.    - c/w home dose of Eliquis 5 mg PO BID (per chart review, pt is unable to obtain this medication due to cost)  - [id number] score: 4    HTN  - last /78  - continue to monitor BP  - c/w home dose of Maxzide 37.5/25 mg PO QD       Dispo: admit to RNF. Plan per above. Estimated LOS:2-3 days      I spent 75 minutes in the professional and overall care of this patient.      Mirna Piña, BEKAH-CNP

## 2025-02-23 NOTE — CONSULTS
Vancomycin Dosing by Pharmacy- INITIAL    Ramya Muller is a 69 y.o. year old female who Pharmacy has been consulted for vancomycin dosing for osteomyelitis/septic arthritis. Based on the patient's indication and renal status this patient will be dosed based on a goal AUC of 400-600.     Renal function is currently stable.    Visit Vitals  /78   Pulse 88   Temp 37 °C (98.6 °F) (Oral)   Resp 18        Lab Results   Component Value Date    CREATININE 0.72 2025    CREATININE 0.69 2024    CREATININE 0.60 2024    CREATININE 0.60 2024        Patient weight is as follows:   Vitals:    25   Weight: 91.6 kg (202 lb)       Cultures:  No results found for the encounter in last 14 days.        No intake/output data recorded.  I/O during current shift:  No intake/output data recorded.    Temp (24hrs), Av °C (98.6 °F), Min:37 °C (98.6 °F), Max:37 °C (98.6 °F)         Assessment/Plan     Patient has already been given a loading dose of 2000 mg.  Will initiate vancomycin maintenance,  1250 mg every 12 hours.    This dosing regimen is predicted by [employer] to result in the following pharmacokinetic parameters:  Loading dose: N/A  Regimen: 1250 mg IV every 12 hours.  Start time: 12:19 on 2025  Exposure target: AUC24 (range)400-600 mg/L.hr   BCZ41-24: 499 mg/L.hr  AUC24,ss: 586 mg/L.hr  Probability of AUC24 > 400: 86 %  Ctrough,ss: 18.8 mg/L  Probability of Ctrough,ss > 20: 45 %    Follow-up level will be ordered on  at 1400 unless clinically indicated sooner.  Will continue to monitor renal function daily while on vancomycin and order serum creatinine at least every 48 hours if not already ordered.  Follow for continued vancomycin needs, clinical response, and signs/symptoms of toxicity.       Nikolai Quevedo, PharmD

## 2025-02-23 NOTE — ED PROVIDER NOTES
"Emergency Department Encounter  [facility] EMERGENCY MEDICINE    Patient: Ramya Muller  MRN: [id number]  : 1955  Date of Evaluation: 2025  ED Provider: Glenda Marie PA-C        History of Present Illness     68 y/o female with PMH of amputated left big toe, DM2, HTN, a fib on Eliquis, sleep apnea presents to the ED with a blister that formed over her amputation. Amputation was in 2024. Pt stated the blister popped today; the fluid was williams/clear in color, denies pus. Pt states the pain started today; pain starts at her toes and travels up to her knee. Pt wrapped the toe before coming to the ED. Pt denies fevers, chills, nausea, vomiting, diarrhea.       History provided by:  Patient   used: No             Visit Vitals  /78   Pulse 88   Temp 37 °C (98.6 °F) (Oral)   Resp 18   Ht 1.575 m (5' 2\")   Wt 91.6 kg (202 lb)   SpO2 99%   BMI 36.95 kg/m²   OB Status Postmenopausal   Smoking Status Never   BSA 2 m²          Physical Exam       Triage vitals:  T 37 °C (98.6 °F)  HR (!) 103  /83  RR 16  O2 97 % None (Room air)    Physical Exam     Physical exam:   General: Vitals noted, no distress. Afebrile.   EENT:  Hearing grossly intact. Normal phonation. MMM. Airway patient. PERRL. EOMI.   Neck: No midline tenderness or paraspinal tenderness. FROM.   Cardiac: Regular, rate, rhythm. Normal S1 and S2.  No murmurs, gallops, rubs.   Pulmonary: Good air exchange. Lungs clear bilaterally. No wheezes, rhonchi, rales. No accessory muscle use.   Abdomen: Soft, nonsurgical. Nontender. No peritoneal signs.   Back: No CVA tenderness. No midline tenderness or paraspinal tenderness. No obvious deformity or step off.   Extremities: No peripheral edema.  Full range of motion. Moves all extremities freely. Amputated left great toe.  Area of fluctuance around the amputation site with no open wounds.  There is soft tissue swelling as well as erythema and excess " warmth to the dorsum of the foot.  No open wounds.  DP and PT pulses full and equal bilaterally.  See photos of feet below.  Skin: No rash. Warm and Dry.   Neuro: No focal neurologic deficits. CN 2-12 grossly intact. Sensation equal bilaterally. No weakness.                 Results       Labs Reviewed   CBC WITH AUTO DIFFERENTIAL - Abnormal       Result Value    WBC 6.8      nRBC 0.0      RBC 4.68      Hemoglobin 12.8      Hematocrit 37.5      MCV 80      MCH 27.4      MCHC 34.1      RDW 11.8      Platelets 279      Neutrophils % 59.3      Immature Granulocytes %, Automated 1.0 (*)     Lymphocytes % 27.3      Monocytes % 8.4      Eosinophils % 2.7      Basophils % 1.3      Neutrophils Absolute 4.01      Immature Granulocytes Absolute, Automated 0.07      Lymphocytes Absolute 1.85      Monocytes Absolute 0.57      Eosinophils Absolute 0.18      Basophils Absolute 0.09     COMPREHENSIVE METABOLIC PANEL - Abnormal    Glucose 268 (*)     Sodium 139      Potassium 4.0      Chloride 103      Bicarbonate 25      Anion Gap 15      Urea Nitrogen 18      Creatinine 0.72      eGFR >90      Calcium 9.2      Albumin 3.9      Alkaline Phosphatase 72      Total Protein 7.5      AST 18      Bilirubin, Total 0.2      ALT 13     SEDIMENTATION RATE, AUTOMATED - Abnormal    Sedimentation Rate 54 (*)    C-REACTIVE PROTEIN - Abnormal    C-Reactive Protein 2.82 (*)    POCT GLUCOSE - Abnormal    POCT Glucose 243 (*)    LACTATE - Normal    Lactate 1.8      Narrative:     Venipuncture immediately after or during the administration of Metamizole may lead to falsely low results. Testing should be performed immediately prior to Metamizole dosing.   BLOOD CULTURE   BLOOD CULTURE       XR foot left 3+ views   Final Result   Cortical irregularity and erosions of the distal 1st metatarsal   compatible with osteomyelitis.        I personally reviewed the images/study and I agree with Alberta Larios DO's (radiology resident) findings as  stated. This study   was interpreted at [facility],   [place], [place].        MACRO:   None        Signed by: Evan Finkelstein 2/23/2025 12:32 AM   Dictation workstation:   [id number]            Medical Decision Making & ED Course         ED Course & MDM     Medical Decision Making  This is a 69-year-old female with past medical history of poorly controlled diabetes with left great toe amputation, A-fib on Eliquis, TATO, hypertension who presents to the ED with concern for an infection to her left foot due to swelling, redness, and increasing pain over the past 1 to 2 days.  Vitals that show she is tachycardic 103 bpm.  Vitals otherwise grossly unremarkable.  On examination patient did have a small area of fluctuance to the distal aspect of her left foot as well as soft tissue swelling to the dorsum of the foot with associated excess warmth.  Neurovascular intact throughout the left foot.  X-ray left foot ordered as well as laboratory studies including blood cultures.  Patient discussed with the attending physician.  X-ray was concerning for osteomyelitis of the distal aspect of the first metatarsal.  CBC grossly unremarkable.  ESR slightly elevated at 54.  CRP elevated 2.85.  CMP showed elevated glucose of 268 but otherwise grossly unremarkable.  Patient ordered vancomycin and Zosyn for her osteomyelitis and cellulitis.  Based on exam no evidence of necrotizing infection at this point in time.  Repeat vitals obtained.  Was informed of all of her results.  At this time show still endorsing more pain and was ordered morphine.  Patient accepted to medicine for further management of her osteomyelitis of her left foot.         Diagnoses as of 02/23/25 0040   Osteomyelitis of left foot, unspecified type (Multi)         Independent Result Review and Interpretation:  X-ray left foot concerning for osteomyelitis of the first metatarsal    The patient was discussed with the following  consultants/services: Admission Coordinator who accepted the patient for admission        Disposition   As a result of their workup, the patient will require admission to the hospital.  The patient was informed of her diagnosis.  The patient was given the opportunity to ask questions and I answered them. The patient agreed to be admitted to the hospital.    Procedures     This was a shared visit with an ED attending.  The patient was seen and discussed with the ED attending    Procedures    Glenda Marie PA-C  Emergency Medicine      Glenda Marie PA-C  02/23/25 0040

## 2025-02-23 NOTE — CARE PLAN
The patient's goals for the shift include  pain control     The clinical goals for the shift include LEAVE      Problem: Skin  Goal: Decreased wound size/increased tissue granulation at next dressing change  Outcome: Progressing  Goal: Participates in plan/prevention/treatment measures  Outcome: Progressing  Goal: Prevent/manage excess moisture  Outcome: Progressing  Goal: Prevent/minimize sheer/friction injuries  Outcome: Progressing  Goal: Promote/optimize nutrition  Outcome: Progressing  Goal: Promote skin healing  Outcome: Progressing     Problem: Pain - Adult  Goal: Verbalizes/displays adequate comfort level or baseline comfort level  Outcome: Progressing     Problem: Safety - Adult  Goal: Free from fall injury  Outcome: Progressing     Problem: Discharge Planning  Goal: Discharge to home or other facility with appropriate resources  Outcome: Progressing     Problem: Chronic Conditions and Co-morbidities  Goal: Patient's chronic conditions and co-morbidity symptoms are monitored and maintained or improved  Outcome: Progressing     Problem: Nutrition  Goal: Nutrient intake appropriate for maintaining nutritional needs  Outcome: Progressing     Problem: Fall/Injury  Goal: Not fall by end of shift  Outcome: Progressing  Goal: Be free from injury by end of the shift  Outcome: Progressing  Goal: Verbalize understanding of personal risk factors for fall in the hospital  Outcome: Progressing  Goal: Verbalize understanding of risk factor reduction measures to prevent injury from fall in the home  Outcome: Progressing  Goal: Use assistive devices by end of the shift  Outcome: Progressing  Goal: Pace activities to prevent fatigue by end of the shift  Outcome: Progressing     Problem: Pain  Goal: Takes deep breaths with improved pain control throughout the shift  Outcome: Progressing  Goal: Turns in bed with improved pain control throughout the shift  Outcome: Progressing  Goal: Walks with improved pain control throughout  the shift  Outcome: Progressing  Goal: Performs ADL's with improved pain control throughout shift  Outcome: Progressing  Goal: Participates in PT with improved pain control throughout the shift  Outcome: Progressing  Goal: Free from opioid side effects throughout the shift  Outcome: Progressing  Goal: Free from acute confusion related to pain meds throughout the shift  Outcome: Progressing

## 2025-02-23 NOTE — CONSULTS
PODIATRY CONSULT NOTE    SERVICE DATE: 2/23/2025   SERVICE TIME:  1400    REASON FOR CONSULT: Osteomyelitis of left foot  REQUESTING PHYSICIAN: Kody Blevins MD  PRIMARY CARE PHYSICIAN: No Assigned PCP Generic Provider, MD    Subjective   HPI:  Patient Ramya Muller is a 69 y.o. female with a PMH of ovarian cancer, pAFib, HTN, TATO on CPAP, T2DM (last HgA1c 9.65 12/20/24), GERD, arthritis, and fibromyalgia. Pt presented to ED for further evaluation of blister at site of L great toe amputation 7/2024. She also endorses severe left foot pain and she is not sure if her diabetic shoe was rubbing against her foot. Pt reports that blister burst prior to ED presentation and it had williams colored drainage. Pain radiates from site of prior amputation on her left foot up to her knee which she rates as 7/10. Labs obtained as part of ED workup reviewed. Labs notable for hyperglycemia (serum glucose 268) and elevated inflammatory markers. Xray imaging of L foot showing findings suggestive of osteomyelitis at 1st metatarsal site. Blood cultures x2 obtained while in ED prior to initiation of IV antibiotics for treatment of osteomyelitis. See media tab in pt chart for pics of left foot on admit. Pt admitted to medicine for treatment of osteomyelitis of left foot.     ROS: 10-point review of systems was performed and is otherwise negative except as noted in HPI.  PMH: Reviewed/documented below.  PSH:  Noncontributory except per HPI   FH: Reviewed and noncontributory   SOCIAL:  Reviewed/documented below.  ALLERGIES: Reviewed/documented below.  MEDS: Reviewed/documented below.  VS: Reviewed/documented below.    Past Medical History:   Diagnosis Date    Atrial fibrillation (Multi) 06/30/2022    Cellulitis     Depressive disorder 09/23/2024    Fibromyalgia 07/23/2024    Gastroesophageal reflux disease 08/08/2016    Last Assessment & Plan:   Continue PPI      HTN (hypertension)     Hypertensive chronic kidney disease with stage 1  through stage 4 chronic kidney disease, or unspecified chronic kidney disease 07/30/2024    Mixed hyperlipidemia 07/13/2022    TATO (obstructive sleep apnea) 12/05/2012    Plan:  CPAP as needed      Plan:  CPAP as needed     Last Assessment & Plan:   Reports compliance with PAP therapy  Continue AuoPAP      Osteomyelitis 07/18/2024    LT GREAT TOE    Stage 3a chronic kidney disease (Multi) 12/06/2022    Type 2 diabetes mellitus      Past Surgical History:   Procedure Laterality Date    HYSTERECTOMY      MYOMECTOMY       No family history on file.  Social History     Tobacco Use    Smoking status: Never     Passive exposure: Never    Smokeless tobacco: Never   Vaping Use    Vaping status: Never Used   Substance Use Topics    Alcohol use: Not Currently    Drug use: Never      Medications Prior to Admission   Medication Sig Dispense Refill Last Dose/Taking    baclofen (Lioresal) 10 mg tablet Take 1 tablet (10 mg) by mouth once daily at bedtime.   Taking    celecoxib (CeleBREX) 100 mg capsule Take 1 capsule (100 mg) by mouth every 12 hours.   Taking    gabapentin (Neurontin) 400 mg capsule Take 1 capsule (400 mg) by mouth 3 times a day.   Taking    rosuvastatin (Crestor) 20 mg tablet Take 1 tablet (20 mg) by mouth once daily.   Taking    semaglutide (Rybelsus) 3 mg tablet Take 1 tablet (3 mg) by mouth once daily.   Taking    semaglutide (Rybelsus) 7 mg tablet Take 1 tablet (7 mg) by mouth once daily.   Taking    triamterene-hydrochlorothiazid (Dyazide) 37.5-25 mg capsule Take 1 capsule by mouth early in the morning..   Taking    apixaban (Eliquis) 5 mg tablet Take 1 tablet (5 mg) by mouth 2 times a day. 60 tablet 11     cholecalciferol (D3-2000) 50 mcg (2,000 unit) capsule Take 1 capsule (50 mcg) by mouth once daily. (Patient not taking: Reported on 1/16/2025)       collagenase (SantyL) 250 unit/gram ointment Apply topically once daily. (Patient not taking: Reported on 1/16/2025)       DULoxetine (Cymbalta) 30 mg   capsule Take 1 capsule (30 mg) by mouth once daily. Do not crush or chew. (Patient not taking: Reported on 1/16/2025)       glipiZIDE (Glucotrol) 5 mg tablet Take 1 tablet (5 mg) by mouth once daily.       polyethylene glycol (Glycolax, Miralax) 17 gram packet Take 17 g by mouth once daily. (Patient taking differently: Take 17 g by mouth once daily as needed.)       sennosides-docusate sodium (Clara-Colace) 8.6-50 mg tablet Take 2 tablets by mouth 2 times a day. (Patient not taking: Reported on 1/16/2025)           Medications:  Scheduled Meds: apixaban, 5 mg, oral, BID  gabapentin, 400 mg, oral, TID  insulin lispro, 0-15 Units, subcutaneous, TID AC  piperacillin-tazobactam, 3.375 g, intravenous, q6h  rosuvastatin, 20 mg, oral, Nightly  triamterene-hydrochlorothiazid, 1 tablet, oral, Daily  vancomycin, 1,250 mg, intravenous, q12h      Continuous Infusions:    PRN Meds: PRN medications: acetaminophen, baclofen, dextrose, dextrose, glucagon, glucagon, melatonin, oxyCODONE, polyethylene glycol, vancomycin    Allergies as of 02/22/2025 - Reviewed 02/22/2025   Allergen Reaction Noted    Atorvastatin Anaphylaxis 07/06/2024    Pregabalin Anaphylaxis 07/06/2024            Objective   PHYSICAL EXAM:  Physical Exam Performed:  Vitals:    02/23/25 1341   BP: 157/89   Pulse: 94   Resp: 16   Temp: 36.7 °C (98 °F)   SpO2: 98%     Body mass index is 36.95 kg/m².    Patient is AOx3 and in no acute distress. Patient is alert and cooperative. Sitting comfortably in bed with dressing clean, dry and intact. Heel off-loading boots in place B/L.    Vascular: Palpable DP/PT pulses B/L. Mild non-pitting edema noted B/L. Hair growth absent B/L. CFT<5 to B/L hallux. Temperature is warm to cool from tibial tuberosity to distal digits B/L. No lymphatic streaking noted B/L.    Musculoskeletal: Gross active and passive ROM intact to age and activity level. Moves all extremities spontaneously. No pain to palpation at feet B/L. S/p left partial  first ray resection.    Neurological: Diminished light touch sensation B/L. Pain stimuli diminished B/L. Denies any numbness, burning or tingling.    Dermatologic:  Skin appears diffusely xerotic B/L. Web spaces 1-4 B/L are clean, dry and intact. No rashes or nodules noted B/L. No hyperkeratotic tissue noted B/L.     Wound:  Following drainage of bulla, Measurements: 1.5 cm x 1.2 cm x 0.2 cm.  Mixed wound base of fibrogranular tissue.   Mild serosanguinous drainage with fibrotic slough.   Mild sina-wound maceration.   Minimal sina-wound erythema.   Mild palpable fluctuance. Mild malodor. Mild increased warmth.   No probe to bone or deep structures within the wound base.   No tunneling or tracking.     LABS:   Results for orders placed or performed during the hospital encounter of 02/22/25 (from the past 24 hours)   POCT GLUCOSE   Result Value Ref Range    POCT Glucose 243 (H) 74 - 99 mg/dL   CBC and Auto Differential   Result Value Ref Range    WBC 6.8 4.4 - 11.3 x10*3/uL    nRBC 0.0 0.0 - 0.0 /100 WBCs    RBC 4.68 4.00 - 5.20 x10*6/uL    Hemoglobin 12.8 12.0 - 16.0 g/dL    Hematocrit 37.5 36.0 - 46.0 %    MCV 80 80 - 100 fL    MCH 27.4 26.0 - 34.0 pg    MCHC 34.1 32.0 - 36.0 g/dL    RDW 11.8 11.5 - 14.5 %    Platelets 279 150 - 450 x10*3/uL    Neutrophils % 59.3 40.0 - 80.0 %    Immature Granulocytes %, Automated 1.0 (H) 0.0 - 0.9 %    Lymphocytes % 27.3 13.0 - 44.0 %    Monocytes % 8.4 2.0 - 10.0 %    Eosinophils % 2.7 0.0 - 6.0 %    Basophils % 1.3 0.0 - 2.0 %    Neutrophils Absolute 4.01 1.20 - 7.70 x10*3/uL    Immature Granulocytes Absolute, Automated 0.07 0.00 - 0.70 x10*3/uL    Lymphocytes Absolute 1.85 1.20 - 4.80 x10*3/uL    Monocytes Absolute 0.57 0.10 - 1.00 x10*3/uL    Eosinophils Absolute 0.18 0.00 - 0.70 x10*3/uL    Basophils Absolute 0.09 0.00 - 0.10 x10*3/uL   Comprehensive metabolic panel   Result Value Ref Range    Glucose 268 (H) 74 - 99 mg/dL    Sodium 139 136 - 145 mmol/L    Potassium 4.0 3.5 -  5.3 mmol/L    Chloride 103 98 - 107 mmol/L    Bicarbonate 25 21 - 32 mmol/L    Anion Gap 15 10 - 20 mmol/L    Urea Nitrogen 18 6 - 23 mg/dL    Creatinine 0.72 0.50 - 1.05 mg/dL    eGFR >90 >60 mL/min/1.73m*2    Calcium 9.2 8.6 - 10.6 mg/dL    Albumin 3.9 3.4 - 5.0 g/dL    Alkaline Phosphatase 72 33 - 136 U/L    Total Protein 7.5 6.4 - 8.2 g/dL    AST 18 9 - 39 U/L    Bilirubin, Total 0.2 0.0 - 1.2 mg/dL    ALT 13 7 - 45 U/L   Lactate   Result Value Ref Range    Lactate 1.8 0.4 - 2.0 mmol/L   Sedimentation rate, automated   Result Value Ref Range    Sedimentation Rate 54 (H) 0 - 30 mm/h   C-reactive protein   Result Value Ref Range    C-Reactive Protein 2.82 (H) <1.00 mg/dL   Blood Culture    Specimen: Peripheral Venipuncture; Blood culture   Result Value Ref Range    Blood Culture Loaded on Instrument - Culture in progress    Blood Culture    Specimen: Peripheral Venipuncture; Blood culture   Result Value Ref Range    Blood Culture Loaded on Instrument - Culture in progress    Tissue/Wound Culture/Smear    Specimen: Wound/Tissue; Tissue/Biopsy   Result Value Ref Range    Gram Stain (1+) Rare Polymorphonuclear leukocytes     Gram Stain No organisms seen    CBC and Auto Differential   Result Value Ref Range    WBC 6.8 4.4 - 11.3 x10*3/uL    nRBC 0.0 0.0 - 0.0 /100 WBCs    RBC 4.31 4.00 - 5.20 x10*6/uL    Hemoglobin 11.4 (L) 12.0 - 16.0 g/dL    Hematocrit 34.2 (L) 36.0 - 46.0 %    MCV 79 (L) 80 - 100 fL    MCH 26.5 26.0 - 34.0 pg    MCHC 33.3 32.0 - 36.0 g/dL    RDW 11.9 11.5 - 14.5 %    Platelets 236 150 - 450 x10*3/uL    Neutrophils % 57.0 40.0 - 80.0 %    Immature Granulocytes %, Automated 1.2 (H) 0.0 - 0.9 %    Lymphocytes % 30.0 13.0 - 44.0 %    Monocytes % 8.0 2.0 - 10.0 %    Eosinophils % 2.9 0.0 - 6.0 %    Basophils % 0.9 0.0 - 2.0 %    Neutrophils Absolute 3.90 1.20 - 7.70 x10*3/uL    Immature Granulocytes Absolute, Automated 0.08 0.00 - 0.70 x10*3/uL    Lymphocytes Absolute 2.05 1.20 - 4.80 x10*3/uL     Monocytes Absolute 0.55 0.10 - 1.00 x10*3/uL    Eosinophils Absolute 0.20 0.00 - 0.70 x10*3/uL    Basophils Absolute 0.06 0.00 - 0.10 x10*3/uL   Renal Function Panel   Result Value Ref Range    Glucose 246 (H) 74 - 99 mg/dL    Sodium 138 136 - 145 mmol/L    Potassium 3.6 3.5 - 5.3 mmol/L    Chloride 103 98 - 107 mmol/L    Bicarbonate 26 21 - 32 mmol/L    Anion Gap 13 10 - 20 mmol/L    Urea Nitrogen 17 6 - 23 mg/dL    Creatinine 0.65 0.50 - 1.05 mg/dL    eGFR >90 >60 mL/min/1.73m*2    Calcium 8.5 (L) 8.6 - 10.6 mg/dL    Phosphorus 3.9 2.5 - 4.9 mg/dL    Albumin 3.4 3.4 - 5.0 g/dL   Lactate   Result Value Ref Range    Lactate 1.7 0.4 - 2.0 mmol/L   Magnesium   Result Value Ref Range    Magnesium 1.72 1.60 - 2.40 mg/dL   POCT GLUCOSE   Result Value Ref Range    POCT Glucose 218 (H) 74 - 99 mg/dL   POCT GLUCOSE   Result Value Ref Range    POCT Glucose 247 (H) 74 - 99 mg/dL      Lab Results   Component Value Date    HGBA1C 9.6 (A) 12/20/2024      Lab Results   Component Value Date    CRP 2.82 (H) 02/22/2025      Lab Results   Component Value Date    SEDRATE 54 (H) 02/22/2025        Results from last 7 days   Lab Units 02/23/25  0440   WBC AUTO x10*3/uL 6.8   RBC AUTO x10*6/uL 4.31   HEMOGLOBIN g/dL 11.4*   HEMATOCRIT % 34.2*     Results from last 7 days   Lab Units 02/23/25  0440 02/22/25  2305   SODIUM mmol/L 138 139   POTASSIUM mmol/L 3.6 4.0   CHLORIDE mmol/L 103 103   CO2 mmol/L 26 25   BUN mg/dL 17 18   CREATININE mg/dL 0.65 0.72   CALCIUM mg/dL 8.5* 9.2   PHOSPHORUS mg/dL 3.9  --    MAGNESIUM mg/dL 1.72  --    BILIRUBIN TOTAL mg/dL  --  0.2   ALT U/L  --  13   AST U/L  --  18           IMAGING REVIEW:  XR foot left 3+ views    Result Date: 2/23/2025  Interpreted By:  Finkelstein, Evan, and Stephens Katherine STUDY: XR FOOT LEFT 3+ VIEWS; ;  2/22/2025 11:28 pm   INDICATION: Signs/Symptoms:L foot infection.     COMPARISON: Radiographs of the left foot 07/18/2024   ACCESSION NUMBER(S): [id number]   ORDERING  CLINICIAN: ALEJANDRO DUTTA   FINDINGS: Three views of the left foot.   Interval transmetatarsal amputation of the great toe. Extensive cortical irregularity and erosive changes of the distal 1st metatarsal. No acute fracture or malalignment. Mild degenerative changes of the midfoot. Achilles insertion and plantar calcaneal enthesophytes noted. Extensive vascular calcifications. No soft tissue gas.       Cortical irregularity and erosions of the distal 1st metatarsal compatible with osteomyelitis.   I personally reviewed the images/study and I agree with Alberta Larios DO's (radiology resident) findings as stated. This study was interpreted at [place], [place].   MACRO: None   Signed by: Evan Finkelstein 2/23/2025 12:32 AM Dictation workstation:   [id number]    XR FOOT GENERAL 3V AP/LAT/OBL BILATERAL    Result Date: 2/14/2025  * * *Final Report* * * DATE OF EXAM: Feb 14 2025 12:00PM   MMX   5555  -  XR FOOT 3V AP/LAT/OBL SOREN   / ACCESSION #  [id number] PROCEDURE REASON: Foot trauma, no prior imaging      * * * * Physician Interpretation * * * *  BILATERAL FOOT X-RAY TECHNIQUE: XR FOOT 3V AP/LAT/OBL SOREN COMPARISON: 04/25/2024 INDICATION:  Bilateral foot pain RESULT: Left foot: Interval amputation of the left great toe and head of the 1st metatarsal.  Soft tissue swelling distal aspect left foot.  Prominent calcaneal enthesophytes again noted.  Midfoot and talonavicular degenerative changes. Right: Degenerative changes right 1st MTP joint and multiple interphalangeal joints of the right foot.  Stable pseudoarticulation between the 3rd and 4th basilar metatarsals with stable exostosis as seen on the prior study.  Calcaneal enthesophytes again noted with chronic erosive changes posterior calcaneal tuberosity. Vascular calcifications bilaterally.  No fracture, dislocation, or osseous erosion. -    IMPRESSION: Postsurgical changes left 1st ray with associated soft tissue  swelling. Degenerative changes both feet. No acute osseous abnormality. : ALAN   Transcribe Date/Time: Feb 14 2025 12:30P Dictated by : HARLEEN JADE MD This examination was interpreted and the report reviewed and electronically signed by: HARLEEN JADE MD on Feb 14 2025 12:34PM  EST            Assessment/Plan   Patient Ramya Muller is a 69 y.o. female with a PMH of ovarian cancer, pAFib, HTN, TATO on CPAP, T2DM (last HgA1c 9.65 12/20/24), GERD, arthritis, and fibromyalgia. Pt presented to ED for further evaluation of blister at site of L great toe amputation 7/2024, X-rays from ER consistent with OM left residual 1st metatarsal.    #Chronic non-pressure ulcer left foot  #Cellulitis left foot  #R/o osteomyelitis left foot  #DM2 with neuropathy (A1c 9.6%)  #Bulla left foot    - Patient was seen and evaluated; all findings were discussed and all questions were answered to patient's satisfaction.  - Charts, labs, vitals and imaging all reviewed.   - Imaging: erosive changes suggestive of osteomyelitis  - Labs: No white count but ESR of 54, CRP of 2.82, A1c 9.6 from 12/2024.  - PVRs: 7/2024 indicates no evidence of arterial occlusive disease.  - Wound culture: Collected in ER, pending.  - Blood culture: NTD.    Plan:  - Incised bulla to reveal ulceration of measurements above, small amount of serous drainage. No pablo purulence but in the setting of inflammatory state with elevated CRP and ESR and X-rays consistent with OM, will still recommend MRI left foot w/o contrast for further infectious work-up for surgery versus PICC line depending on findings.  - Abx: IV vanc/zosyn per primary.  - Pain Regimen: Per primary.  - Discussed possibility of surgery. Patient would prefer to avoid if at all possible given concerns about work and finances. Discussed PICC line in brief.  - Dressings: betadine WTD appropriate for now.  - Nursing staff is able to change/reinforce dressing if & as necessary until next  day’s dressing change. Thank you.  - Podiatry will continue to follow while in house.    DVT ppx: Eliquis 5 mg BID  Weightbearing: WBAT for now.   Discharge: Pt to follow up 1 week after discharge with Dr. Herndon.    Case to be discussed with attending, A&P above reflects a tentative plan. Please await for the final signature from the attending physician on service.    Bienvenido Meza DPM/PGY-3  Podiatric Medicine & Surgery  [place]            SIGNATURE: Bienvenido Meza DPM PATIENT NAME: Ramya Muller   DATE: February 23, 2025 MRN: [id number]   TIME: 3:44 PM CONTACT: Haiku message

## 2025-02-24 ENCOUNTER — PHARMACY VISIT (OUTPATIENT)
Dept: PHARMACY | Facility: CLINIC | Age: 70
End: 2025-02-24
Payer: COMMERCIAL

## 2025-02-24 VITALS
RESPIRATION RATE: 18 BRPM | OXYGEN SATURATION: 98 % | HEART RATE: 87 BPM | HEIGHT: 62 IN | SYSTOLIC BLOOD PRESSURE: 130 MMHG | WEIGHT: 202 LBS | BODY MASS INDEX: 37.17 KG/M2 | TEMPERATURE: 97.9 F | DIASTOLIC BLOOD PRESSURE: 74 MMHG

## 2025-02-24 LAB
ALBUMIN SERPL BCP-MCNC: 3.9 G/DL (ref 3.4–5)
ANION GAP SERPL CALC-SCNC: 13 MMOL/L (ref 10–20)
BASOPHILS # BLD AUTO: 0.08 X10*3/UL (ref 0–0.1)
BASOPHILS NFR BLD AUTO: 1.1 %
BUN SERPL-MCNC: 13 MG/DL (ref 6–23)
CALCIUM SERPL-MCNC: 9.2 MG/DL (ref 8.6–10.6)
CHLORIDE SERPL-SCNC: 101 MMOL/L (ref 98–107)
CO2 SERPL-SCNC: 26 MMOL/L (ref 21–32)
CREAT SERPL-MCNC: 0.62 MG/DL (ref 0.5–1.05)
EGFRCR SERPLBLD CKD-EPI 2021: >90 ML/MIN/1.73M*2
EOSINOPHIL # BLD AUTO: 0.17 X10*3/UL (ref 0–0.7)
EOSINOPHIL NFR BLD AUTO: 2.4 %
ERYTHROCYTE [DISTWIDTH] IN BLOOD BY AUTOMATED COUNT: 11.8 % (ref 11.5–14.5)
EST. AVERAGE GLUCOSE BLD GHB EST-MCNC: 255 MG/DL
GLUCOSE BLD MANUAL STRIP-MCNC: 206 MG/DL (ref 74–99)
GLUCOSE BLD MANUAL STRIP-MCNC: 222 MG/DL (ref 74–99)
GLUCOSE SERPL-MCNC: 213 MG/DL (ref 74–99)
HBA1C MFR BLD: 10.5 %
HCT VFR BLD AUTO: 42.1 % (ref 36–46)
HGB BLD-MCNC: 12.8 G/DL (ref 12–16)
IMM GRANULOCYTES # BLD AUTO: 0.09 X10*3/UL (ref 0–0.7)
IMM GRANULOCYTES NFR BLD AUTO: 1.3 % (ref 0–0.9)
LYMPHOCYTES # BLD AUTO: 1.57 X10*3/UL (ref 1.2–4.8)
LYMPHOCYTES NFR BLD AUTO: 21.9 %
MAGNESIUM SERPL-MCNC: 1.88 MG/DL (ref 1.6–2.4)
MCH RBC QN AUTO: 27.1 PG (ref 26–34)
MCHC RBC AUTO-ENTMCNC: 30.4 G/DL (ref 32–36)
MCV RBC AUTO: 89 FL (ref 80–100)
MONOCYTES # BLD AUTO: 0.41 X10*3/UL (ref 0.1–1)
MONOCYTES NFR BLD AUTO: 5.7 %
NEUTROPHILS # BLD AUTO: 4.86 X10*3/UL (ref 1.2–7.7)
NEUTROPHILS NFR BLD AUTO: 67.6 %
NRBC BLD-RTO: 0 /100 WBCS (ref 0–0)
PHOSPHATE SERPL-MCNC: 3.1 MG/DL (ref 2.5–4.9)
PLATELET # BLD AUTO: 265 X10*3/UL (ref 150–450)
POTASSIUM SERPL-SCNC: 3.9 MMOL/L (ref 3.5–5.3)
RBC # BLD AUTO: 4.72 X10*6/UL (ref 4–5.2)
SODIUM SERPL-SCNC: 136 MMOL/L (ref 136–145)
VANCOMYCIN SERPL-MCNC: 7.5 UG/ML (ref 5–20)
WBC # BLD AUTO: 7.2 X10*3/UL (ref 4.4–11.3)

## 2025-02-24 PROCEDURE — 80069 RENAL FUNCTION PANEL: CPT | Performed by: NURSE PRACTITIONER

## 2025-02-24 PROCEDURE — 2500000001 HC RX 250 WO HCPCS SELF ADMINISTERED DRUGS (ALT 637 FOR MEDICARE OP): Performed by: STUDENT IN AN ORGANIZED HEALTH CARE EDUCATION/TRAINING PROGRAM

## 2025-02-24 PROCEDURE — 2500000002 HC RX 250 W HCPCS SELF ADMINISTERED DRUGS (ALT 637 FOR MEDICARE OP, ALT 636 FOR OP/ED): Performed by: STUDENT IN AN ORGANIZED HEALTH CARE EDUCATION/TRAINING PROGRAM

## 2025-02-24 PROCEDURE — 83036 HEMOGLOBIN GLYCOSYLATED A1C: CPT | Performed by: STUDENT IN AN ORGANIZED HEALTH CARE EDUCATION/TRAINING PROGRAM

## 2025-02-24 PROCEDURE — 82947 ASSAY GLUCOSE BLOOD QUANT: CPT

## 2025-02-24 PROCEDURE — 36415 COLL VENOUS BLD VENIPUNCTURE: CPT

## 2025-02-24 PROCEDURE — 99239 HOSP IP/OBS DSCHRG MGMT >30: CPT | Performed by: STUDENT IN AN ORGANIZED HEALTH CARE EDUCATION/TRAINING PROGRAM

## 2025-02-24 PROCEDURE — 80202 ASSAY OF VANCOMYCIN: CPT

## 2025-02-24 PROCEDURE — 2500000004 HC RX 250 GENERAL PHARMACY W/ HCPCS (ALT 636 FOR OP/ED)

## 2025-02-24 PROCEDURE — 99222 1ST HOSP IP/OBS MODERATE 55: CPT | Performed by: STUDENT IN AN ORGANIZED HEALTH CARE EDUCATION/TRAINING PROGRAM

## 2025-02-24 PROCEDURE — 83735 ASSAY OF MAGNESIUM: CPT | Performed by: NURSE PRACTITIONER

## 2025-02-24 PROCEDURE — 36415 COLL VENOUS BLD VENIPUNCTURE: CPT | Performed by: NURSE PRACTITIONER

## 2025-02-24 PROCEDURE — 85025 COMPLETE CBC W/AUTO DIFF WBC: CPT | Performed by: NURSE PRACTITIONER

## 2025-02-24 PROCEDURE — 2500000001 HC RX 250 WO HCPCS SELF ADMINISTERED DRUGS (ALT 637 FOR MEDICARE OP): Performed by: NURSE PRACTITIONER

## 2025-02-24 PROCEDURE — RXMED WILLOW AMBULATORY MEDICATION CHARGE

## 2025-02-24 PROCEDURE — 2500000004 HC RX 250 GENERAL PHARMACY W/ HCPCS (ALT 636 FOR OP/ED): Performed by: NURSE PRACTITIONER

## 2025-02-24 RX ORDER — OXYCODONE HYDROCHLORIDE 5 MG/1
5 TABLET ORAL EVERY 4 HOURS PRN
Qty: 10 TABLET | Refills: 0 | Status: SHIPPED | OUTPATIENT
Start: 2025-02-24

## 2025-02-24 RX ORDER — OXYCODONE HYDROCHLORIDE 5 MG/1
5 TABLET ORAL EVERY 4 HOURS PRN
Qty: 10 TABLET | Refills: 0 | Status: SHIPPED | OUTPATIENT
Start: 2025-02-24 | End: 2025-02-24

## 2025-02-24 RX ORDER — ACETAMINOPHEN 325 MG/1
650 TABLET ORAL EVERY 6 HOURS PRN
Qty: 30 TABLET | Refills: 0 | Status: SHIPPED | OUTPATIENT
Start: 2025-02-24

## 2025-02-24 RX ORDER — OXYCODONE HYDROCHLORIDE 5 MG/1
5 TABLET ORAL EVERY 4 HOURS PRN
Status: DISCONTINUED | OUTPATIENT
Start: 2025-02-24 | End: 2025-02-24 | Stop reason: HOSPADM

## 2025-02-24 RX ORDER — AMOXICILLIN AND CLAVULANATE POTASSIUM 875; 125 MG/1; MG/1
1 TABLET, FILM COATED ORAL 2 TIMES DAILY
Qty: 60 TABLET | Refills: 1 | Status: SHIPPED | OUTPATIENT
Start: 2025-02-24 | End: 2025-04-07

## 2025-02-24 RX ADMIN — INSULIN LISPRO 6 UNITS: 100 INJECTION, SOLUTION INTRAVENOUS; SUBCUTANEOUS at 08:41

## 2025-02-24 RX ADMIN — APIXABAN 5 MG: 5 TABLET, FILM COATED ORAL at 08:41

## 2025-02-24 RX ADMIN — INSULIN LISPRO 6 UNITS: 100 INJECTION, SOLUTION INTRAVENOUS; SUBCUTANEOUS at 12:10

## 2025-02-24 RX ADMIN — GABAPENTIN 400 MG: 300 CAPSULE ORAL at 08:41

## 2025-02-24 RX ADMIN — PIPERACILLIN SODIUM AND TAZOBACTAM SODIUM 3.38 G: 3; .375 INJECTION, SOLUTION INTRAVENOUS at 00:47

## 2025-02-24 RX ADMIN — GABAPENTIN 400 MG: 300 CAPSULE ORAL at 14:18

## 2025-02-24 RX ADMIN — OXYCODONE 5 MG: 5 TABLET ORAL at 12:22

## 2025-02-24 RX ADMIN — Medication 1250 MG: at 14:18

## 2025-02-24 RX ADMIN — TRIAMTERENE AND HYDROCHLOROTHIAZIDE 1 TABLET: 37.5; 25 TABLET ORAL at 08:41

## 2025-02-24 RX ADMIN — PIPERACILLIN SODIUM AND TAZOBACTAM SODIUM 3.38 G: 3; .375 INJECTION, SOLUTION INTRAVENOUS at 07:06

## 2025-02-24 RX ADMIN — PIPERACILLIN SODIUM AND TAZOBACTAM SODIUM 3.38 G: 3; .375 INJECTION, SOLUTION INTRAVENOUS at 12:10

## 2025-02-24 ASSESSMENT — ACTIVITIES OF DAILY LIVING (ADL): LACK_OF_TRANSPORTATION: NO

## 2025-02-24 ASSESSMENT — PAIN SCALES - GENERAL
PAINLEVEL_OUTOF10: 0 - NO PAIN
PAINLEVEL_OUTOF10: 9

## 2025-02-24 NOTE — CONSULTS
Inpatient consult to Infectious Diseases  Consult performed by: Mannie Shea MD  Consult ordered by: Kody Blevins MD        Referred by Dr. Felicity Arias MD: Aracelis Hernandez MD    Reason For Consult  OM of left foot    History Of Present Illness  Ramya Muller is a 69 y.o. female with a past medical history of T2DM (currently on glipizide), hypertension, CKD, TATO, hx of ovarian tumor s/p resection, and episode of osteomyelitis of L foot in 2024 who presented to the ED with left foot and calf pain. ID was consulted for suspected L foot osteomyelitis.     Patient states that she noted a blister on her foot while she was putting her shoes on, however, was not concerned at the time as she had no pain. She states that the blister 'popped' the next day and she had burning and itching pain along her foot and calf. She denies trying any home medications or therapy for her blister or pain. Wound cx collected on  with NGTD and 1+ polymorphonuclear cells and blood cx also have NGTD. Patient states that podiatry debrided her wound.Patient reports that her pain has since subsided and denies recent sick contacts, fever, chills, fatigue, muscle pain, SOB, cough, chest pain, sudden weight loss or gain.    Patient had previous episode of osteomyelitis diagnosed by MRI in 2024 that was treated w/ antibiotic regimen and surgically with transmetatarsal amputation.    Patient has a long history of uncontrolled T2DM with A1c of 10.5% noted this admission. She reports taking glipizide daily and is opposed to insulin therapy. Patient reports taking HTN medication daily, and gabapentin and Eliquis inconsistently. She prefers not to take any medications and would prefer to leave the hospital as she has a  to attend at 10:30am tomorrow.     Patient denies alcohol, tobacco, or marijuana use.     Past Medical History  She has a past medical history of Atrial fibrillation (Multi) (2022),  Cellulitis, Depressive disorder (09/23/2024), Fibromyalgia (07/23/2024), Gastroesophageal reflux disease (08/08/2016), HTN (hypertension), Hypertensive chronic kidney disease with stage 1 through stage 4 chronic kidney disease, or unspecified chronic kidney disease (07/30/2024), Mixed hyperlipidemia (07/13/2022), TATO (obstructive sleep apnea) (12/05/2012), Osteomyelitis (07/18/2024), Stage 3a chronic kidney disease (Multi) (12/06/2022), and Type 2 diabetes mellitus.    Surgical History  She has a past surgical history that includes Hysterectomy and Myomectomy.     Social History     Occupational History    Not on file   Tobacco Use    Smoking status: Never     Passive exposure: Never    Smokeless tobacco: Never   Vaping Use    Vaping status: Never Used   Substance and Sexual Activity    Alcohol use: Not Currently    Drug use: Never    Sexual activity: Defer     Travel History   Travel since 01/24/25    No documented travel since 01/24/25              Family History  No family history on file.  Allergies  Atorvastatin, Pregabalin, Empagliflozin, and Metformin     Immunization History   Administered Date(s) Administered    Pfizer Gray Cap SARS-CoV-2 07/12/2022     Medications  Home medications:  Medications Prior to Admission   Medication Sig Dispense Refill Last Dose/Taking    baclofen (Lioresal) 10 mg tablet Take 1 tablet (10 mg) by mouth once daily at bedtime.   Taking    celecoxib (CeleBREX) 100 mg capsule Take 1 capsule (100 mg) by mouth every 12 hours.   Taking    gabapentin (Neurontin) 400 mg capsule Take 1 capsule (400 mg) by mouth 3 times a day.   Taking    polyethylene glycol (Glycolax, Miralax) 17 gram packet Take 17 g by mouth once daily.   Taking    rosuvastatin (Crestor) 20 mg tablet Take 1 tablet (20 mg) by mouth once daily.   Taking    semaglutide (Rybelsus) 3 mg tablet Take 1 tablet (3 mg) by mouth once daily.   Taking    semaglutide (Rybelsus) 7 mg tablet Take 1 tablet (7 mg) by mouth once daily.    Taking    sennosides-docusate sodium (Clara-Colace) 8.6-50 mg tablet Take 2 tablets by mouth 2 times a day.   Taking    triamterene-hydrochlorothiazid (Dyazide) 37.5-25 mg capsule Take 1 capsule by mouth early in the morning..   Taking    cholecalciferol (D3-2000) 50 mcg (2,000 unit) capsule Take 1 capsule (50 mcg) by mouth once daily.       collagenase (SantyL) 250 unit/gram ointment Apply topically once daily. (Patient not taking: Reported on 1/16/2025)       DULoxetine (Cymbalta) 30 mg DR capsule Take 1 capsule (30 mg) by mouth once daily. Do not crush or chew.       glipiZIDE (Glucotrol) 5 mg tablet Take 1 tablet (5 mg) by mouth once daily.       [DISCONTINUED] apixaban (Eliquis) 5 mg tablet Take 1 tablet (5 mg) by mouth 2 times a day. 60 tablet 11      Current medications:  Scheduled medications  apixaban, 5 mg, oral, BID  gabapentin, 400 mg, oral, TID  insulin lispro, 0-15 Units, subcutaneous, TID AC  piperacillin-tazobactam, 3.375 g, intravenous, q6h  rosuvastatin, 20 mg, oral, Nightly  triamterene-hydrochlorothiazid, 1 tablet, oral, Daily  vancomycin, 1,250 mg, intravenous, q12h      Continuous medications     PRN medications  PRN medications: acetaminophen, baclofen, dextrose, dextrose, glucagon, glucagon, melatonin, oxyCODONE, polyethylene glycol, vancomycin    Review of Systems     Review of systems otherwise negative    Objective  Range of Vitals (last 24 hours)  Heart Rate:  [87-88]   Temp:  [36.6 °C (97.9 °F)]   Resp:  [16-18]   BP: (130-152)/(74-83)   SpO2:  [94 %-98 %]   Daily Weight  02/22/25 : 91.6 kg (202 lb)    Body mass index is 36.95 kg/m².     Physical Exam   General: in no acute distress, able to answer questions  HEENT: no conjunctival injection. anicteric.  CVS: RRR. Normal S1 and S2. No m/r/g.   RESP: ctab no w/r/r, no increased wob  Abd: +BS. Soft and lax. ND.   Ext: Open wound on dorsal surface of L foot with serous fluid drainage, no erythema, no warmth or pain to palpation, evidence of  "surgical removal of L hallux  Neuro: Answers questions appropriately.  Integumentary: no obvious lesions     Relevant Results  Labs  Results from last 72 hours   Lab Units 02/24/25 0604 02/23/25 0440 02/22/25  2305   WBC AUTO x10*3/uL 7.2 6.8 6.8   HEMOGLOBIN g/dL 12.8 11.4* 12.8   HEMATOCRIT % 42.1 34.2* 37.5   PLATELETS AUTO x10*3/uL 265 236 279   NEUTROS PCT AUTO % 67.6 57.0 59.3   LYMPHS PCT AUTO % 21.9 30.0 27.3   MONOS PCT AUTO % 5.7 8.0 8.4   EOS PCT AUTO % 2.4 2.9 2.7     Results from last 72 hours   Lab Units 02/24/25 0604 02/23/25 0440 02/22/25  2305   SODIUM mmol/L 136 138 139   POTASSIUM mmol/L 3.9 3.6 4.0   CHLORIDE mmol/L 101 103 103   CO2 mmol/L 26 26 25   BUN mg/dL 13 17 18   CREATININE mg/dL 0.62 0.65 0.72   GLUCOSE mg/dL 213* 246* 268*   CALCIUM mg/dL 9.2 8.5* 9.2   ANION GAP mmol/L 13 13 15   EGFR mL/min/1.73m*2 >90 >90 >90   PHOSPHORUS mg/dL 3.1 3.9  --      Results from last 72 hours   Lab Units 02/24/25 0604 02/23/25 0440 02/22/25  2305   ALK PHOS U/L  --   --  72   BILIRUBIN TOTAL mg/dL  --   --  0.2   PROTEIN TOTAL g/dL  --   --  7.5   ALT U/L  --   --  13   AST U/L  --   --  18   ALBUMIN g/dL 3.9 3.4 3.9     Estimated Creatinine Clearance: 92.7 mL/min (by C-G formula based on SCr of 0.62 mg/dL).  C-Reactive Protein   Date Value Ref Range Status   02/22/2025 2.82 (H) <1.00 mg/dL Final   07/27/2024 9.04 (H) <1.00 mg/dL Final   07/18/2024 14.69 (H) <1.00 mg/dL Final     Sedimentation Rate   Date Value Ref Range Status   02/22/2025 54 (H) 0 - 30 mm/h Final   07/18/2024 84 (H) 0 - 30 mm/h Final   07/06/2024 >130 (H) 0 - 30 mm/h Final     No results found for: \"HIV1X2\", \"HIVCONF\", \"VIFAFZ0PL\"  No results found for: \"HEPCABINIT\", \"HEPCAB\", \"HCVPCRQUANT\"  Microbiology  Susceptibility data from last 90 days.  Collected Specimen Info Organism   02/23/25 Tissue/Biopsy from Wound/Tissue Mixed Skin Microorganisms      Imaging    Xray foot  IMPRESSION:  Cortical irregularity and erosions of the " distal 1st metatarsal  compatible with osteomyelitis.     Assessment/Plan     JANEE Muller is a 69 y.o. woman with a past medical history of uncontrolled T2DM, hypertension, CKD, TATO, hx of ovarian tumor s/p resection, and episode of osteomyelitis of L foot in 7/2024 who presented to  for L foot pain. The ID team was consulted for to suspected osteomyelitis.     #First metatarsal OM  #Past history of left great toe abscess s/p L hallux amputation on 7/24/24  Due to patient's history of osteomyelitis in the same foot, uncontrolled diabetes as evidenced by A1c of 10.5%, and X-ray showing erosions of the distal 1st metatarsal the most likely diagnosis is osteomyelitis. Other possible diagnoses include cellulitis, osteonecrosis due to peripheral vascular disease, and gout, however these are less likely due to patient's history and X-ray. Cultures are pending and showing mixed skin organisms    -Continue vancomycin  -Continue zosyn 3.375 q 6 hours  -We will continue to follow cultures  -Podiatry assistance appreciated as surgical debridement likely necessary    -Patient noted multiple times that she wanted to leave. I did tell her that this is not adequate care for this infection as we need podiatry debridement, finalization of cultures, and potentially IV therapy. If patient leaves AMA please send her with augmentin 875 BID for 6 weeks of therapy    ID will continue to follow. If any questions regarding this patient please haiku    Written with the assistance of Brenda Shea  Infectious Diseases  Team A

## 2025-02-24 NOTE — PROGRESS NOTES
Transitional Care Coordination Progress Note:  Patient discussed during interdisciplinary rounds.   Team members present: MD, TCC  Plan per Medical/Surgical team: Osteomyelitis of left foot  Payor: [employer]  Discharge disposition: PT/OT pending  Potential Barriers: none  ADOD: 2-3 days  Per MD, pending final recommendations from ID. PT/OT pending. Will continue to monitor for discharge planning needs.   Update 1345: Per MD, patient will dc home today with home health care. Met with patient at bedside and she is agreeable to home health care. Educated regarding freedom of choice and provided financial disclosure. Patient agreeable to blanket referral to see which agency is able to accept. Referrals placed via careport. MD asked to complete home care orders.   Update 1420: Met with patient to discuss accepting agency of [facility]. Patient in agreement as she has worked with them in the past. Home care orders and AVS sent via careport for start of care within 24-48hrs.     oHpe VILLASENOR, RN  Transitional Care Coordinator (TCC)  [phone]

## 2025-02-24 NOTE — PROGRESS NOTES
Vancomycin Dosing by Pharmacy- FOLLOW UP    Ramya Muller is a 69 y.o. year old female who Pharmacy has been consulted for vancomycin dosing for osteomyelitis/septic arthritis. Based on the patient's indication and renal status this patient is being dosed based on a goal AUC of 400-600.     Renal function is currently stable.    Current vancomycin dose: 1250 mg given every 12 hours    Estimated vancomycin AUC on current dose: 437 mg/L.hr     Visit Vitals  /74 (BP Location: Right arm, Patient Position: Lying)   Pulse 87   Temp 36.6 °C (97.9 °F) (Temporal)   Resp 18        Lab Results   Component Value Date    CREATININE 0.62 2025    CREATININE 0.65 2025    CREATININE 0.72 2025    CREATININE 0.69 2024        Patient weight is as follows:   Vitals:    25   Weight: 91.6 kg (202 lb)       Cultures:  No results found for the encounter in last 14 days.       I/O last 3 completed shifts:  In: 50 (0.5 mL/kg) [IV Piggyback:50]  Out: - (0 mL/kg)   Weight: 91.6 kg   I/O during current shift:  No intake/output data recorded.    Temp (24hrs), Av.6 °C (97.9 °F), Min:36.6 °C (97.9 °F), Max:36.6 °C (97.9 °F)      Assessment/Plan    Within goal AUC range. Continue current vancomycin regimen.    This dosing regimen is predicted by [employer] to result in the following pharmacokinetic parameters:  Loading dose: N/A  Regimen: 1250 mg IV every 12 hours.  Start time: 21:57 on 2025  Exposure target: AUC24 (range)400-600 mg/L.hr   FUQ15-83: 405 mg/L.hr  AUC24,ss: 437 mg/L.hr  Probability of AUC24 > 400: 71 %  Ctrough,ss: 12.2 mg/L  Probability of Ctrough,ss > 20: 1 %      The next level will be obtained on  at 0500. May be obtained sooner if clinically indicated.   Will continue to monitor renal function daily while on vancomycin and order serum creatinine at least every 48 hours if not already ordered.  Follow for continued vancomycin needs, clinical response, and signs/symptoms of  toxicity.       Henrietta Shea, PharmD

## 2025-02-24 NOTE — DISCHARGE SUMMARY
Discharge Diagnosis  Osteomyelitis of left foot (Multi)    Issues Requiring Follow-Up  PCP     Discharge Meds     Medication List      START taking these medications     acetaminophen 325 mg tablet; Commonly known as: Tylenol; Take 2 tablets   (650 mg) by mouth every 6 hours if needed for mild pain (1 - 3), headaches   or fever (temp greater than 38.0 C).   amoxicillin-pot clavulanate 875-125 mg tablet; Commonly known as:   Augmentin; Take 1 tablet by mouth 2 times a day.   D3-2000 50 mcg (2,000 unit) capsule; Generic drug: cholecalciferol   DULoxetine 30 mg DR capsule; Commonly known as: Cymbalta   oxyCODONE 5 mg immediate release tablet; Commonly known as: Roxicodone;   Take 1 tablet (5 mg) by mouth every 4 hours if needed for moderate pain (4   - 6) or severe pain (7 - 10).   sennosides-docusate sodium 8.6-50 mg tablet; Commonly known as:   Clara-Colace; Take 2 tablets by mouth 2 times a day.     CHANGE how you take these medications     polyethylene glycol 17 gram packet; Commonly known as: Glycolax,   Miralax; Take 17 g by mouth once daily.; What changed: when to take this,   reasons to take this     CONTINUE taking these medications     apixaban 5 mg tablet; Commonly known as: Eliquis; Take 1 tablet (5 mg)   by mouth 2 times a day.   baclofen 10 mg tablet; Commonly known as: Lioresal   gabapentin 400 mg capsule; Commonly known as: Neurontin   glipiZIDE 5 mg tablet; Commonly known as: Glucotrol; Take 1 tablet (5   mg) by mouth once daily.   rosuvastatin 20 mg tablet; Commonly known as: Crestor   * Rybelsus 3 mg tablet; Generic drug: semaglutide   * Rybelsus 7 mg tablet; Generic drug: semaglutide   triamterene-hydrochlorothiazid 37.5-25 mg capsule; Commonly known as:   Dyazide  * This list has 2 medication(s) that are the same as other medications   prescribed for you. Read the directions carefully, and ask your doctor or   other care provider to review them with you.     STOP taking these medications      celecoxib 100 mg capsule; Commonly known as: CeleBREX   SantyL 250 unit/gram ointment; Generic drug: collagenase       Test Results Pending At Discharge  Pending Labs       Order Current Status    Blood Culture Preliminary result    Blood Culture Preliminary result    Tissue/Wound Culture/Smear Preliminary result            Hospital Course         JANEE Muller is a 69 y.o. woman with a past medical history of uncontrolled T2DM, hypertension, CKD, TATO, hx of ovarian tumor s/p resection, and episode of osteomyelitis of L foot in 2024 who presented to  for L foot pain. The ID was consulted   #First metatarsal OM  #Past history of left great toe abscess s/p L hallux amputation on 24  Due to patient's history of osteomyelitis in the same foot, uncontrolled diabetes as evidenced by A1c of 10.5%, and X-ray showing erosions of the distal 1st metatarsal the most likely diagnosis is osteomyelitis. Other possible diagnoses include cellulitis, osteonecrosis due to peripheral vascular disease, and gout, however these are less likely due to patient's history and X-ray. Cultures are pending and showing mixed skin organisms  Podiatry assistance appreciated as surgical debridement likely necessary  Patient noted multiple times that she wanted to leave. I did tell her that this is not adequate care for this infection as we need podiatry debridement, finalization of cultures, and potentially IV therapy.Pt states she has  event she needs to attend asap.  - dc home  with augmentin 875 BID for 6 weeks of therapy     Send with endo referral      Discharge plan of care discussed, education and counseling provided involving active problem care plan, warning signs,  risks/benefits of new medications or medication changes, follow-up care and testing.  Patient advised to follow-up with her primary care within 1 week of discharge or the next available for posthospitalization transition of care.  There was verbalized understanding  and agreement with discharge care plan.      Pt instructed to take all medications as prescribed.  Keep all follow-up appointments.  Contact their primary care physician with any questions or concerns that arise.  Come to the emergency department with worsening of your symptoms or any other medical emergency. Instructed that any outpatient tests that are ordered for outpatient follow up are meant to expedite outpatient work up and management, and that the results are not followed or managed by the inpatient ordering team and MUST be followed up with the outpatient primary care or outpatient specialist.  Verbal understanding and agreement obtained to order tests for outpatient follow up purposes.       Time conducting this discharge is >40 minutes     Pertinent Physical Exam At Time of Discharge  Physical Exam    Outpatient Follow-Up  No future appointments.      Kody Blevins MD

## 2025-02-24 NOTE — PROGRESS NOTES
02/24/25 1100   Discharge Planning   Living Arrangements Spouse/significant other   Support Systems Children   Assistance Needed none   Type of Residence Private residence   Number of Stairs to Enter Residence 12   Number of Stairs Within Residence 0   Do you have animals or pets at home? No   Home or Post Acute Services None   Expected Discharge Disposition Home   Does the patient need discharge transport arranged? No   Financial Resource Strain   How hard is it for you to pay for the very basics like food, housing, medical care, and heating? Not hard   Housing Stability   In the last 12 months, was there a time when you were not able to pay the mortgage or rent on time? N   At any time in the past 12 months, were you homeless or living in a shelter (including now)? N   Transportation Needs   In the past 12 months, has lack of transportation kept you from medical appointments or from getting medications? no   In the past 12 months, has lack of transportation kept you from meetings, work, or from getting things needed for daily living? No     TCC admission assessment completed. Explained role of TCC - verbalized understanding.     Demographics/Insurance: Confirmed in chart.   Living Environment: Lives at home alone. Independent in all care prior to the hospital per patient.  Primary Support Person: David Rojo, [phone]  PCP: Dr[facility]elis Hernandez at [facility]  Recent Falls: Fell into the clothes basket recently. No injuries  Assistive Devices/DME: Denies   Home Oxygen: Denies   Dialysis: Denies   Home Care Agency: Denies   Transportation at Discharge: Daughter  Pharmacy: [facility]  Concerns about discharge: None at this time.     Cely Parada RN, TCC

## 2025-02-24 NOTE — DISCHARGE INSTRUCTIONS
PODIATRY DISCHARGE INSTRUCTIONS  Please call to schedule appointment with Dr. Burnette for 1 week after discharge or sooner if any problems or questions arise. Your appointment will be scheduled at either [facility] or [facility] at [address]. The podiatry team reached out to her  to help set this up for you.  Bear weight as tolerated in surgical shoe. The wound dressing would be betadine-soaked 4x4s, ABD, kerlix, ACE wrap should your current dressing come off. If there are supplies you may change up to every other day.  Keep dressing clean and dry until your first follow up appointment.  Do not shower unless using a cast protector to protect dressing.  If dressing gets wet, change or call office immediately as this can lead to increased risk of infection.  Place ice pack behind knee of extremity.  Elevate surgical extremity as much as possible to help with pain and swelling.  Should any problems, questions, or concerns arise, please call the clinic office.     Always [facility] ([phone]) will call you within 24-48hrs to schedule start of care.

## 2025-02-24 NOTE — CARE PLAN
The patient's goals for the shift include      The clinical goals for the shift include LEAVE      Problem: Safety - Adult  Goal: Free from fall injury  Outcome: Progressing

## 2025-02-25 LAB
BACTERIA SPEC CULT: NORMAL
GRAM STN SPEC: NORMAL
GRAM STN SPEC: NORMAL

## 2025-02-27 LAB
BACTERIA BLD CULT: NORMAL
BACTERIA BLD CULT: NORMAL

## 2025-03-20 ENCOUNTER — PHARMACY VISIT (OUTPATIENT)
Dept: PHARMACY | Facility: CLINIC | Age: 70
End: 2025-03-20
Payer: COMMERCIAL

## 2025-03-20 PROCEDURE — RXMED WILLOW AMBULATORY MEDICATION CHARGE

## 2025-03-24 ENCOUNTER — OFFICE VISIT (OUTPATIENT)
Dept: URGENT CARE | Age: 70
End: 2025-03-24
Payer: MEDICARE

## 2025-03-24 VITALS
SYSTOLIC BLOOD PRESSURE: 171 MMHG | HEART RATE: 97 BPM | RESPIRATION RATE: 18 BRPM | TEMPERATURE: 98.4 F | DIASTOLIC BLOOD PRESSURE: 99 MMHG | OXYGEN SATURATION: 96 %

## 2025-03-24 DIAGNOSIS — R06.6 HICCUPS: ICD-10-CM

## 2025-03-24 DIAGNOSIS — R11.2 NAUSEA AND VOMITING, UNSPECIFIED VOMITING TYPE: ICD-10-CM

## 2025-03-24 DIAGNOSIS — K52.9 GASTROENTERITIS: Primary | ICD-10-CM

## 2025-03-24 PROCEDURE — 1111F DSCHRG MED/CURRENT MED MERGE: CPT | Performed by: NURSE PRACTITIONER

## 2025-03-24 PROCEDURE — 1159F MED LIST DOCD IN RCRD: CPT | Performed by: NURSE PRACTITIONER

## 2025-03-24 PROCEDURE — 3046F HEMOGLOBIN A1C LEVEL >9.0%: CPT | Performed by: NURSE PRACTITIONER

## 2025-03-24 PROCEDURE — 1036F TOBACCO NON-USER: CPT | Performed by: NURSE PRACTITIONER

## 2025-03-24 PROCEDURE — 3077F SYST BP >= 140 MM HG: CPT | Performed by: NURSE PRACTITIONER

## 2025-03-24 PROCEDURE — 96372 THER/PROPH/DIAG INJ SC/IM: CPT | Performed by: NURSE PRACTITIONER

## 2025-03-24 PROCEDURE — 3080F DIAST BP >= 90 MM HG: CPT | Performed by: NURSE PRACTITIONER

## 2025-03-24 PROCEDURE — 99213 OFFICE O/P EST LOW 20 MIN: CPT | Performed by: NURSE PRACTITIONER

## 2025-03-24 RX ORDER — METOCLOPRAMIDE 10 MG/1
5 TABLET ORAL ONCE
Status: DISCONTINUED | OUTPATIENT
Start: 2025-03-24 | End: 2025-03-24

## 2025-03-24 RX ORDER — METOCLOPRAMIDE 5 MG/1
5 TABLET ORAL 4 TIMES DAILY PRN
Qty: 40 TABLET | Refills: 0 | Status: SHIPPED | OUTPATIENT
Start: 2025-03-24 | End: 2025-04-03

## 2025-03-24 RX ORDER — ONDANSETRON 4 MG/1
4 TABLET, ORALLY DISINTEGRATING ORAL EVERY 8 HOURS PRN
Qty: 10 TABLET | Refills: 0 | Status: SHIPPED | OUTPATIENT
Start: 2025-03-24

## 2025-03-24 RX ORDER — ONDANSETRON HYDROCHLORIDE 4 MG/2ML
4 INJECTION, SOLUTION INTRAMUSCULAR; INTRAVENOUS ONCE
Status: COMPLETED | OUTPATIENT
Start: 2025-03-24 | End: 2025-03-24

## 2025-03-24 RX ADMIN — ONDANSETRON HYDROCHLORIDE 4 MG: 4 INJECTION, SOLUTION INTRAMUSCULAR; INTRAVENOUS at 10:03

## 2025-03-24 ASSESSMENT — ENCOUNTER SYMPTOMS
RESPIRATORY NEGATIVE: 1
HEMATOLOGIC/LYMPHATIC NEGATIVE: 1
ENDOCRINE NEGATIVE: 1
ALLERGIC/IMMUNOLOGIC NEGATIVE: 1
VOMITING: 1
NEUROLOGICAL NEGATIVE: 1
MUSCULOSKELETAL NEGATIVE: 1
PSYCHIATRIC NEGATIVE: 1
ABDOMINAL PAIN: 1
FATIGUE: 1
EYES NEGATIVE: 1

## 2025-03-24 NOTE — LETTER
March 24, 2025     Patient: Ramya Muller   YOB: 1955   Date of Visit: 3/24/2025       To Whom It May Concern:    Ramya Muller was seen in my clinic on 3/24/2025 at 9:30 am. Please excuse Ramya for her absence from work on this day to make the appointment.    If you have any questions or concerns, please don't hesitate to call.         Sincerely,         Poonam Padilla, APRN-CNP        CC: No Recipients

## 2025-03-24 NOTE — PROGRESS NOTES
Subjective   Patient ID: Ramya Muller is a 69 y.o. female. They present today with a chief complaint of hiccuping, Vomiting (4 days of constant vomiting and hiccups.), and Dizziness.    History of Present Illness    History provided by:  Patient   used: No    Vomiting  Severity:  Moderate  Duration:  4 days  Timing:  Intermittent  Quality:  Bilious material  Progression:  Unchanged  Chronicity:  New  Recent urination:  Decreased  Ineffective treatments:  None tried  Associated symptoms: abdominal pain    Risk factors: sick contacts        Past Medical History  Allergies as of 03/24/2025 - Reviewed 03/24/2025   Allergen Reaction Noted    Atorvastatin Anaphylaxis 07/06/2024    Pregabalin Anaphylaxis 07/06/2024    Empagliflozin Other 01/21/2025    Metformin GI Upset 12/20/2024       (Not in a hospital admission)       Past Medical History:   Diagnosis Date    Atrial fibrillation (Multi) 06/30/2022    Cellulitis     Depressive disorder 09/23/2024    Fibromyalgia 07/23/2024    Gastroesophageal reflux disease 08/08/2016    Last Assessment & Plan:   Continue PPI      HTN (hypertension)     Hypertensive chronic kidney disease with stage 1 through stage 4 chronic kidney disease, or unspecified chronic kidney disease 07/30/2024    Mixed hyperlipidemia 07/13/2022    TATO (obstructive sleep apnea) 12/05/2012    Plan:  CPAP as needed      Plan:  CPAP as needed     Last Assessment & Plan:   Reports compliance with PAP therapy  Continue AuoPAP      Osteomyelitis 07/18/2024    LT GREAT TOE    Stage 3a chronic kidney disease (Multi) 12/06/2022    Type 2 diabetes mellitus        Past Surgical History:   Procedure Laterality Date    HYSTERECTOMY      MYOMECTOMY          reports that she has never smoked. She has never been exposed to tobacco smoke. She has never used smokeless tobacco. She reports that she does not currently use alcohol. She reports that she does not use drugs.    Review of Systems  Review of  Systems   Constitutional:  Positive for fatigue.   Eyes: Negative.    Respiratory: Negative.     Gastrointestinal:  Positive for abdominal pain and vomiting.   Endocrine: Negative.    Genitourinary: Negative.    Musculoskeletal: Negative.    Skin: Negative.    Allergic/Immunologic: Negative.    Neurological: Negative.    Hematological: Negative.    Psychiatric/Behavioral: Negative.                                    Objective    Vitals:    03/24/25 0938   BP: (!) 171/99   Pulse: 97   Resp: 18   Temp: 36.9 °C (98.4 °F)   SpO2: 96%     No LMP recorded. Patient is postmenopausal.    Physical Exam  Vitals reviewed.   Constitutional:       Appearance: Normal appearance.   Cardiovascular:      Rate and Rhythm: Normal rate and regular rhythm.      Pulses: Normal pulses.      Heart sounds: Normal heart sounds.   Abdominal:      Palpations: Abdomen is soft.      Tenderness: There is abdominal tenderness in the epigastric area.   Skin:     General: Skin is warm and dry.   Neurological:      General: No focal deficit present.      Mental Status: She is alert and oriented to person, place, and time. Mental status is at baseline.   Psychiatric:         Mood and Affect: Mood normal.         Behavior: Behavior normal.         Thought Content: Thought content normal.         Judgment: Judgment normal.         Procedures    Point of Care Test & Imaging Results from this visit  No results found for this visit on 03/24/25.   No results found.    Diagnostic study results (if any) were reviewed by ANDREW Bloes.    Assessment/Plan   Allergies, medications, history, and pertinent labs/EKGs/Imaging reviewed by ANDREW Boles.     Medical Decision Making  Medical Decision Making  At time of discharge patient was clinically well-appearing and HDS for outpatient management. The patient and/or family was educated regarding diagnosis, supportive care, OTC and Rx medications. The patient and/or family was given the  opportunity to ask questions prior to discharge.  They verbalized understanding of my discussion of the plans for treatment, expected course, indications to return to  or seek further evaluation in ED, and the need for timely follow up as directed.   They were provided with a work/school excuse if requested.    has not taken her meds due to nausea or vomiting, will take now she has has nausea under control.  Declining to go to ER.  Will go to ER if she does not improve in nexrt 24 hours.  Matt b/p 150/88.    Orders and Diagnoses  Diagnoses and all orders for this visit:  Gastroenteritis  -     ondansetron HCl (PF) (ZOFRAN) injection 4 mg  -     ondansetron ODT (Zofran-ODT) 4 mg disintegrating tablet; Dissolve 1 tablet (4 mg) in the mouth every 8 hours if needed for nausea or vomiting for up to 10 doses.  Nausea and vomiting, unspecified vomiting type  -     ondansetron HCl (PF) (ZOFRAN) injection 4 mg  -     ondansetron ODT (Zofran-ODT) 4 mg disintegrating tablet; Dissolve 1 tablet (4 mg) in the mouth every 8 hours if needed for nausea or vomiting for up to 10 doses.  Hiccups  -     metoclopramide (Reglan) 5 mg tablet; Take 1 tablet (5 mg) by mouth 4 times a day as needed (hiccups) for up to 10 days.      Medical Admin Record  Administrations This Visit       ondansetron HCl (PF) (ZOFRAN) injection 4 mg       Admin Date  03/24/2025 Action  Given Dose  4 mg Route  intramuscular Documented By  Matilde Platt MA                    Patient disposition: Home    Electronically signed by ANDREW Boles  10:07 AM

## 2025-04-08 ENCOUNTER — APPOINTMENT (OUTPATIENT)
Dept: INFECTIOUS DISEASES | Facility: CLINIC | Age: 70
End: 2025-04-08
Payer: MEDICARE

## 2025-07-31 ENCOUNTER — APPOINTMENT (OUTPATIENT)
Dept: ENDOCRINOLOGY | Facility: CLINIC | Age: 70
End: 2025-07-31
Payer: MEDICARE

## 2025-09-23 ENCOUNTER — APPOINTMENT (OUTPATIENT)
Dept: ENDOCRINOLOGY | Facility: CLINIC | Age: 70
End: 2025-09-23
Payer: MEDICARE

## (undated) DEVICE — TUBESET, IRRIGATION, BONE CUTTER BONESCALPEL

## (undated) DEVICE — TOWEL, SURGICAL, NEURO, O/R, 16 X 26, BLUE, STERILE

## (undated) DEVICE — DRAPE, SHEET, EXTREMITY, W/ARM BOARD COVERS, 87 X 106 X 128 IN, DISPOSABLE, LF, STERILE

## (undated) DEVICE — TIP, SUCTION, YANKAUER, BULB, ADULT

## (undated) DEVICE — PACKING, IODOFORM, CURITY, 0.5 IN X 5 YD, STERILE

## (undated) DEVICE — SOLUTION, IRRIGATION, SODIUM CHLORIDE 0.9%, 1000 ML, POUR BOTTLE

## (undated) DEVICE — TIP, SUCTION, YANKAUER, FLEXIBLE

## (undated) DEVICE — PREP TRAY, SKIN, DRY, STANDARD

## (undated) DEVICE — TUBING, SUCTION, CONNECTING, STERILE 0.25 X 120 IN., LF

## (undated) DEVICE — COLLECTION/DELIVERY SYSTEM, COPAN ESWAB, REG SIZE SWAB

## (undated) DEVICE — DRAPE, INSTRUMENT, W/POUCH, STERI DRAPE, 7 X 11 IN, DISPOSABLE, STERILE

## (undated) DEVICE — Device

## (undated) DEVICE — BANDAGE, ELASTIC, PREMIUM, SELF-CLOSE, 4 IN X 5.5 YD, STERILE

## (undated) DEVICE — COVER, CART, 45 X 27 X 48 IN, CLEAR

## (undated) DEVICE — SPONGE, LAP, XRAY DECT, 18IN X 18IN, W/MASTER DMT, STERILE

## (undated) DEVICE — PROBE, CYLINDRICAL, SUCTION, TITANIUM

## (undated) DEVICE — PAD, GROUNDING, ELECTROSURGICAL, W/9 FT CABLE, POLYHESIVE II, ADULT, LF

## (undated) DEVICE — DRESSING, GAUZE, WASHED FLUFF, LARGE, STERILE

## (undated) DEVICE — NEEDLE, HYPODERMIC, 25 G X 1.5 IN, A BEVEL, STERILE

## (undated) DEVICE — DRAPE, SHEET, U, W/ADHESIVE STRIP, IMPERVIOUS, 60 X 70 IN, DISPOSABLE, LF, STERILE

## (undated) DEVICE — BANDAGE, ELASTIC, SELF-CLOSE, 4 IN, HONEYCOMB, STERILE

## (undated) DEVICE — DRESSING, ABDOMINAL, TENDERSORB, 8 X 10 IN, STERILE

## (undated) DEVICE — SUTURE, PROLENE, 0, 30 IN, CT1, BLUE

## (undated) DEVICE — BANDAGE, ELASTIC, PREMIUM, SELF-CLOSE, 6 IN X 5.5 YD, STERILE

## (undated) DEVICE — BLADE, OSCILLATING/SAGITTAL, 25MM X 9MM

## (undated) DEVICE — DRAPE, PAD, PREP, W/ 9 IN CUFF, 24 X 41, LF, NS

## (undated) DEVICE — DRESSING, GAUZE, 16 PLY, 4 X 4 IN, STERILE

## (undated) DEVICE — DRAPE, IRRIGATION, W/POUCH, ADHESIVE STRIP, STERI DRAPE, 19 X 23 IN, DISPOSABLE, STERILE

## (undated) DEVICE — MANIFOLD, 4 PORT NEPTUNE STANDARD

## (undated) DEVICE — DRESSING, GAUZE, PETROLATUM, XEROFORM, 5 X 9 IN, STERILE

## (undated) DEVICE — SYRINGE, LUER LOCK, 12ML

## (undated) DEVICE — CLEANER, ELECTROSURGICAL, TIP, 5 X 5 CM, LF

## (undated) DEVICE — PACKING, PLAIN, CURITY, 0.5 IN X 5 YD, STERILE

## (undated) DEVICE — GLOVE, SURGICAL, PROTEXIS PI MICRO, 7.5, PF, LF

## (undated) DEVICE — SUTURE, PROLENE, 2-0, CT-1, BL MONO, LF

## (undated) DEVICE — COVER, MAYO STAND, W/PAD, 23 IN, DISPOSABLE, PLASTIC, LF, STERILE

## (undated) DEVICE — DRAPE, SHEET, FAN FOLDED, HALF, 44 X 58 IN, DISPOSABLE, LF, STERILE

## (undated) DEVICE — BANDAGE, GAUZE, CONFORMING, KERLIX, 6 PLY, 4.5 IN X 4.1 YD

## (undated) DEVICE — BANDAGE, ELASTIC, SELF-CLOSE, 6 IN, HONEYCOMB, STERILE

## (undated) DEVICE — GLOVE, SURGICAL, PROTEXIS PI BLUE W/NEUTHERA, 8.0, PF, LF